# Patient Record
Sex: FEMALE | Race: WHITE | NOT HISPANIC OR LATINO | Employment: OTHER | URBAN - METROPOLITAN AREA
[De-identification: names, ages, dates, MRNs, and addresses within clinical notes are randomized per-mention and may not be internally consistent; named-entity substitution may affect disease eponyms.]

---

## 2017-05-19 PROBLEM — E03.9 HYPOTHYROID: Status: ACTIVE | Noted: 2017-05-19

## 2018-06-05 ENCOUNTER — HOSPITAL ENCOUNTER (OUTPATIENT)
Dept: RADIOLOGY | Facility: OTHER | Age: 63
Discharge: HOME | End: 2018-06-05
Attending: INTERNAL MEDICINE
Payer: COMMERCIAL

## 2018-06-05 DIAGNOSIS — M85.89 OSTEOPENIA OF MULTIPLE SITES: ICD-10-CM

## 2018-06-05 PROCEDURE — 77080 DXA BONE DENSITY AXIAL: CPT | Mod: TC

## 2018-06-05 PROCEDURE — 77080 DXA BONE DENSITY AXIAL: CPT | Mod: 26,,, | Performed by: RADIOLOGY

## 2018-07-06 PROBLEM — I48.0 PAF (PAROXYSMAL ATRIAL FIBRILLATION): Status: ACTIVE | Noted: 2018-07-06

## 2018-09-20 ENCOUNTER — TELEPHONE (OUTPATIENT)
Dept: ELECTROPHYSIOLOGY | Facility: CLINIC | Age: 63
End: 2018-09-20

## 2018-09-20 DIAGNOSIS — I48.0 PAF (PAROXYSMAL ATRIAL FIBRILLATION): Primary | ICD-10-CM

## 2018-09-20 NOTE — TELEPHONE ENCOUNTER
----- Message from Nati Asher MA sent at 9/20/2018 11:39 AM CDT -----  Contact: Office of Dr. Joy Rice [phone]   Please evaluate and let me know if we need to book sooner than 10/17. If so ,how soon?  ----- Message -----  From: Clair Louie  Sent: 9/20/2018  10:25 AM  To: Mary Yeager    Good Morning,     Dr. Rice's office reached out this morning to check the status of this request. The patient stated she did not receive a call yet.    Thank you for your assistance!    ----- Message -----  From: Clair Louie  Sent: 9/17/2018  11:58 AM  To: Lonnie Hernandez MD    Good Morning,     Dr. Rice wanted to know if  the current patient could be scheduled with a sooner F/U appointment(she is currently scheduled for 10/17). The patient returns to [place] on 10/2.     Thank you for your assistance!    Clair Louie   [facility]  [phone]

## 2018-09-20 NOTE — TELEPHONE ENCOUNTER
----- Message from Nati Asher MA sent at 9/20/2018 11:39 AM CDT -----  Contact: Office of Dr. Joy Rice [phone]   Please evaluate and let me know if we need to book sooner than 10/17. If so ,how soon?  ----- Message -----  From: Clair Louie  Sent: 9/20/2018  10:25 AM  To: Mary Yeager    Good Morning,     Dr. Rice's office reached out this morning to check the status of this request. The patient stated she did not receive a call yet.    Thank you for your assistance!    ----- Message -----  From: Clair Louie  Sent: 9/17/2018  11:58 AM  To: Lonnie Hernandez MD    Good Morning,     Dr. Rice wanted to know if  the current patient could be scheduled with a sooner F/U appointment(she is currently scheduled for 10/17). The patient returns to [place] on 10/2.     Thank you for your assistance!    Clair Louie   [facility]  [phone]

## 2018-09-20 NOTE — TELEPHONE ENCOUNTER
Discussed with Dr Hernandez. He wants to get a 48 hour holter prior to OV. Spoke with patient and rescheduled the 24 hour holter to a 48 hour holter. She verbalized understanding.

## 2018-09-20 NOTE — TELEPHONE ENCOUNTER
"Spoke with patient. She is having intermittent palpitations she describes as "pounding" that happen at least 1-2 times daily. She denies any prolonged periods of feeling her heart racing. She is on Eliquis. She recently had STEMI in [place] with thrombectomy. She last saw Dr Hernandez 2 years ago. She is not in any acute distress at this time. Advised that we do not have anything available after 10/3/18 and before 10/17/18. We can get a holter done prior to appt. Scheduled for 10/4/18, but patient wants to have a 4-day holter since that was what she had done in [place]. Advised that I will review with Dr Hernandez and let her know if he recommends anything different than a 24 hour holter at this point. She verbalizes understanding.   "

## 2018-10-01 DIAGNOSIS — I48.0 PAF (PAROXYSMAL ATRIAL FIBRILLATION): Primary | ICD-10-CM

## 2018-10-05 ENCOUNTER — CLINICAL SUPPORT (OUTPATIENT)
Dept: ELECTROPHYSIOLOGY | Facility: CLINIC | Age: 63
End: 2018-10-05
Attending: INTERNAL MEDICINE
Payer: COMMERCIAL

## 2018-10-05 DIAGNOSIS — I48.0 PAF (PAROXYSMAL ATRIAL FIBRILLATION): ICD-10-CM

## 2018-10-05 PROCEDURE — 93224 XTRNL ECG REC UP TO 48 HRS: CPT | Mod: S$GLB,,, | Performed by: INTERNAL MEDICINE

## 2018-10-15 ENCOUNTER — HOSPITAL ENCOUNTER (OUTPATIENT)
Dept: RADIOLOGY | Facility: HOSPITAL | Age: 63
Discharge: HOME | End: 2018-10-15
Attending: INTERNAL MEDICINE
Payer: COMMERCIAL

## 2018-10-15 ENCOUNTER — INITIAL CONSULT (OUTPATIENT)
Dept: CARDIOLOGY | Facility: CLINIC | Age: 63
End: 2018-10-15
Payer: COMMERCIAL

## 2018-10-15 VITALS
HEART RATE: 50 BPM | HEIGHT: 66 IN | OXYGEN SATURATION: 98 % | DIASTOLIC BLOOD PRESSURE: 70 MMHG | BODY MASS INDEX: 28.7 KG/M2 | SYSTOLIC BLOOD PRESSURE: 145 MMHG | WEIGHT: 178.56 LBS

## 2018-10-15 DIAGNOSIS — I25.118 CORONARY ARTERY DISEASE OF NATIVE ARTERY OF NATIVE HEART WITH STABLE ANGINA PECTORIS: ICD-10-CM

## 2018-10-15 DIAGNOSIS — I48.0 PAF (PAROXYSMAL ATRIAL FIBRILLATION): Primary | ICD-10-CM

## 2018-10-15 DIAGNOSIS — E03.4 HYPOTHYROIDISM DUE TO ACQUIRED ATROPHY OF THYROID: ICD-10-CM

## 2018-10-15 DIAGNOSIS — Z12.39 SCREENING FOR BREAST CANCER: ICD-10-CM

## 2018-10-15 DIAGNOSIS — I10 ESSENTIAL HYPERTENSION: ICD-10-CM

## 2018-10-15 DIAGNOSIS — Z90.722 S/P BSO (BILATERAL SALPINGO-OOPHORECTOMY): ICD-10-CM

## 2018-10-15 PROCEDURE — 77067 SCR MAMMO BI INCL CAD: CPT | Mod: TC

## 2018-10-15 PROCEDURE — 77063 BREAST TOMOSYNTHESIS BI: CPT | Mod: 26,,, | Performed by: RADIOLOGY

## 2018-10-15 PROCEDURE — 77067 SCR MAMMO BI INCL CAD: CPT | Mod: 26,,, | Performed by: RADIOLOGY

## 2018-10-15 PROCEDURE — 3078F DIAST BP <80 MM HG: CPT | Mod: CPTII,S$GLB,, | Performed by: INTERNAL MEDICINE

## 2018-10-15 PROCEDURE — 77063 BREAST TOMOSYNTHESIS BI: CPT | Mod: TC

## 2018-10-15 PROCEDURE — 3077F SYST BP >= 140 MM HG: CPT | Mod: CPTII,S$GLB,, | Performed by: INTERNAL MEDICINE

## 2018-10-15 PROCEDURE — 99999 PR PBB SHADOW E&M-EST. PATIENT-LVL V: CPT | Mod: PBBFAC,,, | Performed by: INTERNAL MEDICINE

## 2018-10-15 PROCEDURE — 3008F BODY MASS INDEX DOCD: CPT | Mod: CPTII,S$GLB,, | Performed by: INTERNAL MEDICINE

## 2018-10-15 PROCEDURE — 99203 OFFICE O/P NEW LOW 30 MIN: CPT | Mod: S$GLB,,, | Performed by: INTERNAL MEDICINE

## 2018-10-15 RX ORDER — METOPROLOL TARTRATE 25 MG/1
12.5 TABLET, FILM COATED ORAL
COMMUNITY
Start: 2018-07-01 | End: 2018-10-19

## 2018-10-15 RX ORDER — LOSARTAN POTASSIUM 50 MG/1
50 TABLET ORAL DAILY
Refills: 2 | COMMUNITY
Start: 2018-10-08 | End: 2019-07-17 | Stop reason: SDUPTHER

## 2018-10-15 NOTE — PROGRESS NOTES
Subjective:    Patient ID:  Socorro Amaro is a 63 y.o. lady who presents for evaluation of LAAOD implantation.      Referring Physician: Joy Rice MD/ Dr. Chandu Morales    HPI   62 yo o F who is referred to us by Dr. Morales for evaluation of LAAOD. She has CHADsVASc score of 4. Functional status > 4 METS and denies angina/CHF symptoms. She reports occasional palpitations.     She has PMHx of essential HTN, dyslipidemia, CAD s/p STEMI 07/12/18 due to embolic occlusion of LAD s/p thrombectomy without stenting and pAF on Eliquis. She was diagnosed with pAF in 2016 but started anticoagulation after STEMIn in 6/30/2018.       Her CHADsVASc score is 4 (considering recent thromboembolism episode).    Past Medical History:   Diagnosis Date    HTN (hypertension)     Thyroid disease      Current Outpatient Medications on File Prior to Visit   Medication Sig Dispense Refill    5-hydroxytryptophan (5-HTP) 100 mg Cap Take by mouth every evening.       apixaban (ELIQUIS) 5 mg Tab Take 5 mg by mouth.      cholecalciferol, vitamin D3, (VITAMIN D3) 1,000 unit capsule Vitamin D3      cyanocobalamin (VITAMIN B-12) 1000 MCG tablet Take 5,000 mcg by mouth once daily.      DHA-PHOSPHATIDYLSERINE ORAL phosphatidylserine      levothyroxine (SYNTHROID) 75 MCG tablet Brand only TAKE ONE TABLET BY MOUTH ONE TIME DAILY BEFORE BRRAKFAST 90 tablet 3    loratadine (CLARITIN) 10 mg tablet Allerclear 10 mg tablet   Take 1 tablet as needed by oral route.      losartan (COZAAR) 50 MG tablet Take 50 mg by mouth once daily.  2    FAUSTO, BULK, MISC fausto (bulk)      metoprolol tartrate (LOPRESSOR) 25 MG tablet Take 12.5 mg by mouth.      multivitamin capsule Take 1 capsule by mouth once daily.      olopatadine (PATANOL) 0.1 % ophthalmic solution Place 1 drop into both eyes 2 (two) times daily. (Patient taking differently: Place 1 drop into both eyes daily as needed. ) 5 mL 6    omeprazole (PRILOSEC) 40 MG capsule omeprazole 40  "mg capsule,delayed release   Take 1 capsule every day by oral route.      PHOSPHATIDYL SERINE, BULK, MISC 1 capsule by Misc.(Non-Drug; Combo Route) route once daily. 150 mg per capsule      PRASTERONE, DHEA, (DHEA ORAL) Take by mouth once daily. 50 mg      triamcinolone (NASACORT) 55 mcg nasal inhaler 2 sprays by Nasal route once daily.      vitamin D 1000 units Tab Take 2,000 Units by mouth once daily.       ALPRAZolam (XANAX) 0.25 MG tablet TAKE 1 TABLET BY MOUTH 1 hour before flight  0    FLUZONE QUAD 0422-9999, PF, 60 mcg (15 mcg x 4)/0.5 mL Syrg       ibuprofen (ADVIL,MOTRIN) 600 MG tablet Take 1 tablet (600 mg total) by mouth every 6 (six) hours as needed for Pain (cramping). 30 tablet 0    lorazepam (ATIVAN) 1 MG tablet as needed.       ranitidine (ZANTAC) 300 MG tablet ranitidine 300 mg tablet      [DISCONTINUED] cyanocobalamin (VITAMIN B-12) 100 MCG tablet Vitamin B12      [DISCONTINUED] loratadine (CLARITIN) 10 mg tablet Take 10 mg by mouth daily as needed for Allergies.      [DISCONTINUED] prasterone, dhea,-calcium carb (DHEA) 10 mg-47 mg calcium Tab DHEA      [DISCONTINUED] ranitidine (ZANTAC) 300 MG tablet ranitidine 300 mg tablet      [DISCONTINUED] valsartan (DIOVAN) 80 MG tablet Take 1 tablet (80 mg total) by mouth once daily. 90 tablet 3     No current facility-administered medications on file prior to visit.      Vitals:    10/15/18 1114 10/15/18 1117   BP: (!) 140/71 (!) 145/70   BP Location: Left arm Right arm   Patient Position: Sitting Sitting   BP Method: Large (Automatic) Large (Automatic)   Pulse: (!) 52 (!) 50   SpO2: 98%    Weight: 81 kg (178 lb 9.2 oz)    Height: 5' 6" (1.676 m)      Body mass index is 28.82 kg/m².          Review of Systems   Constitution: Negative for chills, decreased appetite, fever, weakness, night sweats, weight gain and weight loss.   HENT: Negative for congestion, hoarse voice, stridor and tinnitus.    Eyes: Negative for blurred vision, pain and " visual disturbance.   Cardiovascular: Negative for chest pain, claudication, dyspnea on exertion, irregular heartbeat, leg swelling, near-syncope, orthopnea, palpitations, paroxysmal nocturnal dyspnea and syncope.   Respiratory: Negative for cough, hemoptysis, shortness of breath, snoring and wheezing.    Endocrine: Negative for cold intolerance, heat intolerance and polyuria.   Hematologic/Lymphatic: Positive for bleeding problem. Bruises/bleeds easily.   Skin: Negative for color change and rash.   Musculoskeletal: Negative for arthritis, back pain, joint pain, muscle cramps, myalgias and stiffness.   Gastrointestinal: Negative for abdominal pain, anorexia, constipation, diarrhea, dysphagia, heartburn, hemorrhoids, melena, nausea and vomiting.   Genitourinary: Negative for frequency, hematuria, hesitancy, nocturia and urgency.   Neurological: Negative for difficulty with concentration, dizziness, focal weakness, headaches, light-headedness, numbness, seizures, tremors and vertigo.   Psychiatric/Behavioral: Negative for altered mental status and depression. The patient does not have insomnia.    Allergic/Immunologic: Negative for hives.          Objective:    Physical Exam   Constitutional: She appears well-developed and well-nourished.   HENT:   Head: Normocephalic and atraumatic.   Right Ear: External ear normal.   Left Ear: External ear normal.   Eyes: Conjunctivae and EOM are normal. Pupils are equal, round, and reactive to light.   Neck: Normal range of motion. Neck supple. No JVD present. No thyromegaly present.   Cardiovascular: Normal rate, regular rhythm, S1 normal, S2 normal, normal heart sounds and intact distal pulses. Exam reveals no gallop, no S3 and no friction rub.   No murmur heard.  Pulses:       Radial pulses are 2+ on the right side, and 2+ on the left side.        Femoral pulses are 2+ on the right side, and 2+ on the left side.       Dorsalis pedis pulses are 2+ on the right side, and 2+ on the  left side.        Posterior tibial pulses are 2+ on the right side, and 2+ on the left side.   Pulmonary/Chest: Effort normal. No stridor. She has no wheezes. She has no rales. She exhibits no tenderness.   Abdominal: Soft. Bowel sounds are normal. She exhibits no distension. There is no tenderness. There is no rebound and no guarding.   Musculoskeletal: Normal range of motion. She exhibits no edema or tenderness.   Psychiatric: She has a normal mood and affect.     Assessment:       1. PAF (paroxysmal atrial fibrillation) with CHADsVASc score of 4 - on Eliquis    2. Coronary artery disease of native artery of native heart with stable angina pectoris    3. Essential hypertension    4. S/P laparoscopic BSO (bilateral salpingo-oophorectomy)    5. Hypothyroidism due to acquired atrophy of thyroid         Plan:     Recommend to continue anti-coagulation with eliquis per now. She is not currently at high bleeding risk to warrant any LAAOD implantation. Her HASBLED score is 2.    Will obtain records from [place], [place] including cath films/Echo disc      Dany Burgess MD  PGY-8  Interventional Cardiology/Structural Fellow     Staff:  I have personally taken the history and examined this patient and agree with the fellow's note as stated above and amended it accordingly :-) This nice lady is a friend of Chandu Morales and had an embolic coronary occlusion with STEMI successfully treated with catheter thrombectomy.  She did not bring her echo or cath films with her.  Because of PAF she was put on OAC after her event.  Dr. Morales asked that I see her and discuss the Watchman device.    This healthy lady has no history or physical examination findings that suggest she would be at risk for long term OAC.  Therefore, there is no role at this time for Watchman Device.  I've encouraged her to see Dr. Hernandez for her afib  and be followed and treated.

## 2018-10-15 NOTE — LETTER
October 15, 2018      [place] [id number] [facility] [address]           [facility]  [address]  [facility] [address]  Phone: [phone]          Patient: Socorro Amaro   MR Number: [id number]   YOB: 1955   Date of Visit: 10/15/2018       Dear [facility]:    Thank you for referring Socorro Amaro to me for evaluation. Attached you will find relevant portions of my assessment and plan of care.    If you have questions, please do not hesitate to call me. I look forward to following Socorro Amaro along with you.    Sincerely,    Hernandez Mir MD    Enclosure  CC:  No Recipients    If you would like to receive this communication electronically, please contact [email or web address] or [phone] to request more information on [employer] Link access.    For providers and/or their staff who would like to refer a patient to [facility], please contact us through our one-stop-shop provider referral line, [facility], at [phone].    If you feel you have received this communication in error or would no longer like to receive these types of communications, please e-mail [email or web address]

## 2018-10-17 ENCOUNTER — OFFICE VISIT (OUTPATIENT)
Dept: PODIATRY | Facility: CLINIC | Age: 63
End: 2018-10-17
Payer: COMMERCIAL

## 2018-10-17 ENCOUNTER — HOSPITAL ENCOUNTER (OUTPATIENT)
Dept: CARDIOLOGY | Facility: CLINIC | Age: 63
Discharge: HOME | End: 2018-10-17
Payer: COMMERCIAL

## 2018-10-17 ENCOUNTER — OFFICE VISIT (OUTPATIENT)
Dept: ELECTROPHYSIOLOGY | Facility: CLINIC | Age: 63
End: 2018-10-17
Payer: COMMERCIAL

## 2018-10-17 VITALS
SYSTOLIC BLOOD PRESSURE: 128 MMHG | HEART RATE: 54 BPM | WEIGHT: 179.69 LBS | DIASTOLIC BLOOD PRESSURE: 72 MMHG | BODY MASS INDEX: 28.88 KG/M2 | HEIGHT: 66 IN

## 2018-10-17 VITALS
HEART RATE: 59 BPM | WEIGHT: 179.69 LBS | DIASTOLIC BLOOD PRESSURE: 72 MMHG | SYSTOLIC BLOOD PRESSURE: 136 MMHG | HEIGHT: 66 IN | BODY MASS INDEX: 28.88 KG/M2

## 2018-10-17 DIAGNOSIS — I25.118 CORONARY ARTERY DISEASE OF NATIVE ARTERY OF NATIVE HEART WITH STABLE ANGINA PECTORIS: ICD-10-CM

## 2018-10-17 DIAGNOSIS — L60.9 DISEASE OF NAIL: Primary | ICD-10-CM

## 2018-10-17 DIAGNOSIS — I48.0 PAF (PAROXYSMAL ATRIAL FIBRILLATION): ICD-10-CM

## 2018-10-17 DIAGNOSIS — I48.0 PAF (PAROXYSMAL ATRIAL FIBRILLATION): Primary | ICD-10-CM

## 2018-10-17 DIAGNOSIS — I10 ESSENTIAL HYPERTENSION: ICD-10-CM

## 2018-10-17 DIAGNOSIS — E03.4 HYPOTHYROIDISM DUE TO ACQUIRED ATROPHY OF THYROID: ICD-10-CM

## 2018-10-17 PROCEDURE — 3008F BODY MASS INDEX DOCD: CPT | Mod: CPTII,S$GLB,, | Performed by: INTERNAL MEDICINE

## 2018-10-17 PROCEDURE — 99203 OFFICE O/P NEW LOW 30 MIN: CPT | Mod: S$GLB,,, | Performed by: PODIATRIST

## 2018-10-17 PROCEDURE — 99999 PR PBB SHADOW E&M-EST. PATIENT-LVL V: CPT | Mod: PBBFAC,,, | Performed by: PODIATRIST

## 2018-10-17 PROCEDURE — 3074F SYST BP LT 130 MM HG: CPT | Mod: CPTII,S$GLB,, | Performed by: INTERNAL MEDICINE

## 2018-10-17 PROCEDURE — 3008F BODY MASS INDEX DOCD: CPT | Mod: CPTII,S$GLB,, | Performed by: PODIATRIST

## 2018-10-17 PROCEDURE — 93000 ELECTROCARDIOGRAM COMPLETE: CPT | Mod: S$GLB,,, | Performed by: INTERNAL MEDICINE

## 2018-10-17 PROCEDURE — 99215 OFFICE O/P EST HI 40 MIN: CPT | Mod: S$GLB,,, | Performed by: INTERNAL MEDICINE

## 2018-10-17 PROCEDURE — 99999 PR PBB SHADOW E&M-EST. PATIENT-LVL III: CPT | Mod: PBBFAC,,, | Performed by: INTERNAL MEDICINE

## 2018-10-17 PROCEDURE — 87102 FUNGUS ISOLATION CULTURE: CPT

## 2018-10-17 PROCEDURE — 3078F DIAST BP <80 MM HG: CPT | Mod: CPTII,S$GLB,, | Performed by: INTERNAL MEDICINE

## 2018-10-17 PROCEDURE — 3075F SYST BP GE 130 - 139MM HG: CPT | Mod: CPTII,S$GLB,, | Performed by: PODIATRIST

## 2018-10-17 PROCEDURE — 3078F DIAST BP <80 MM HG: CPT | Mod: CPTII,S$GLB,, | Performed by: PODIATRIST

## 2018-10-17 NOTE — LETTER
October 18, 2018      Joy Rice MD  [address]  [address]  [facility] [address]           [facility] - Podiatry  [address]  [facility] [address]  Phone: [phone]          Patient: Socorro Amaro   MR Number: [id number]   YOB: 1955   Date of Visit: 10/17/2018       Dear Dr. Joy Rice:    Thank you for referring Socorro Amaro to me for evaluation. Attached you will find relevant portions of my assessment and plan of care.    If you have questions, please do not hesitate to call me. I look forward to following Socorro Amaro along with you.    Sincerely,    Kristyn Ellsworth, BHARTI    Enclosure  CC:  No Recipients    If you would like to receive this communication electronically, please contact [email or web address] or [phone] to request more information on [employer] Link access.    For providers and/or their staff who would like to refer a patient to [facility], please contact us through our one-stop-shop provider referral line, [facility], at [phone].    If you feel you have received this communication in error or would no longer like to receive these types of communications, please e-mail [email or web address]

## 2018-10-17 NOTE — PROGRESS NOTES
Subjective:    Patient ID:  Socorro Amaro is a 63 y.o. female who presents for follow-up of Atrial Fibrillation      HPI 62 yo female with HTN, Hypothyroidism, atrial fibrillation.  Was in  [place] in [place] with new onset chest pain, after a bout of bronchitis  KEYON 2016 no ischemia, LVEF 60-65% normal   [place] : Holter 3/16 1400 PVCs, 126 APCs  Since her last follow-up with me, she has been diagnosed with atrial fibrillation. Was not on oral anticoagulation.  CAD s/p STEMI 07/12/18 due to embolic occlusion of LAD s/p thrombectomy without stenting.  Placed on Eliquis, lopressor 25 mg BID.  48 hr holter 10/5/18 nsr without significant arrhythmias.  She indicates that the diagnosis of atrial fibrillation was based on what she said to the physicians during her STEMI.  We have never documented AF.  All of her symptoms are lasting seconds, not minutes or hours.  Echo 6/30/18 EF 55-60%, normal RV function apical hypokinesis  Notes feeling fuzzy since addition of her medications.              Review of Systems   Constitution: Negative. Negative for weakness and malaise/fatigue.   Cardiovascular: Negative for chest pain, dyspnea on exertion, irregular heartbeat, leg swelling, near-syncope, orthopnea, palpitations, paroxysmal nocturnal dyspnea and syncope.   Respiratory: Negative for cough and shortness of breath.    Neurological: Negative for dizziness and light-headedness.   All other systems reviewed and are negative.       Objective:    Physical Exam   Constitutional: She is oriented to person, place, and time. She appears well-developed and well-nourished.   Eyes: Conjunctivae are normal. No scleral icterus.   Neck: No JVD present. No tracheal deviation present.   Cardiovascular: Normal rate and regular rhythm. PMI is not displaced.   Pulmonary/Chest: Effort normal and breath sounds normal. No respiratory distress.   Abdominal: Soft. There is no hepatosplenomegaly. There is no tenderness.   Musculoskeletal: She  exhibits no edema or tenderness.   Neurological: She is alert and oriented to person, place, and time.   Skin: Skin is warm and dry. No rash noted.   Psychiatric: She has a normal mood and affect. Her behavior is normal.         Assessment:       1. PAF (paroxysmal atrial fibrillation)    2. Essential hypertension    3. Coronary artery disease of native artery of native heart with stable angina pectoris    4. Hypothyroidism due to acquired atrophy of thyroid         Plan:             The notes indicate a diagnosis of atrial fibrillation, but that has never been documented that I am aware of (pt reports her impression of having atrial fibrillation was based on a few seconds of extra beats as opposed to something sustained).    Recommend implantation of ILR for monitoring for atrial fibrillation.  Arrange for General Cardiology to see patient.  Consider other etiologies for her thrombo-embolic event.  Risks and benefits of ILR discussed.  Continue Eliquis soto-procedure.

## 2018-10-17 NOTE — LETTER
October 17, 2018      Joy Rice MD  [address]  [address]  [facility] [address]           [facility] - Arrhythmia  [address]  [facility] [address]  Phone: [phone]  Fax: [phone]          Patient: Socorro Amaro   MR Number: [id number]   YOB: 1955   Date of Visit: 10/17/2018       Dear Dr. Joy Rice:    Thank you for referring Socorro Amaro to me for evaluation. Attached you will find relevant portions of my assessment and plan of care.    If you have questions, please do not hesitate to call me. I look forward to following Socorro Amaro along with you.    Sincerely,    Lonnie Hernandez MD    Enclosure  CC:  No Recipients    If you would like to receive this communication electronically, please contact [email or web address] or [phone] to request more information on [employer] Link access.    For providers and/or their staff who would like to refer a patient to [facility], please contact us through our one-stop-shop provider referral line, [facility], at [phone].    If you feel you have received this communication in error or would no longer like to receive these types of communications, please e-mail [email or web address]

## 2018-10-19 ENCOUNTER — DOCUMENTATION ONLY (OUTPATIENT)
Dept: CARDIOLOGY | Facility: CLINIC | Age: 63
End: 2018-10-19

## 2018-10-19 DIAGNOSIS — I24.0: Primary | ICD-10-CM

## 2018-10-19 NOTE — PROGRESS NOTES
Lonnie and I cannot find documentation of her ever having PAF.  He has recommended a loop recorder and I'm recommending a MARINE/bubble study to R/O PFO.  If not afib and a PFO, then PFO closure is indicated.

## 2018-10-19 NOTE — PROGRESS NOTES
Subjective:      Patient ID: Socorro Amaro is a 63 y.o. female.    Chief Complaint: Foot Problem (bilateral) and Nail Problem (toenail fungus)    Socorro is a 63 y.o. female who presents to the clinic complaining of thick and discolored toenails on both feet. Socorro is inquiring about treatment options.            Patient Active Problem List   Diagnosis    Postmenopausal    HTN (hypertension)    Well woman exam with routine gynecological exam    Palpitations    Pelvic pain in female    S/P laparoscopic BSO (bilateral salpingo-oophorectomy)    Ovarian cyst, left    Hypothyroid    PAF (paroxysmal atrial fibrillation)    Coronary artery disease of native artery of native heart with stable angina pectoris       Current Outpatient Medications on File Prior to Visit   Medication Sig Dispense Refill    5-hydroxytryptophan (5-HTP) 100 mg Cap Take by mouth every evening.       ALPRAZolam (XANAX) 0.25 MG tablet TAKE 1 TABLET BY MOUTH 1 hour before flight  0    apixaban (ELIQUIS) 5 mg Tab Take 5 mg by mouth.      cholecalciferol, vitamin D3, (VITAMIN D3) 1,000 unit capsule Vitamin D3      cyanocobalamin (VITAMIN B-12) 1000 MCG tablet Take 5,000 mcg by mouth once daily.      DHA-PHOSPHATIDYLSERINE ORAL phosphatidylserine      FLUZONE QUAD 3579-4508, PF, 60 mcg (15 mcg x 4)/0.5 mL Syrg       ibuprofen (ADVIL,MOTRIN) 600 MG tablet Take 1 tablet (600 mg total) by mouth every 6 (six) hours as needed for Pain (cramping). 30 tablet 0    levothyroxine (SYNTHROID) 75 MCG tablet Brand only TAKE ONE TABLET BY MOUTH ONE TIME DAILY BEFORE BRRAKFAST 90 tablet 3    loratadine (CLARITIN) 10 mg tablet Allerclear 10 mg tablet   Take 1 tablet as needed by oral route.      lorazepam (ATIVAN) 1 MG tablet as needed.       losartan (COZAAR) 50 MG tablet Take 50 mg by mouth once daily.  2    FAUSTO, BULK, MISC fausto (bulk)      metoprolol tartrate (LOPRESSOR) 25 MG tablet Take 12.5 mg by mouth.      multivitamin capsule  Take 1 capsule by mouth once daily.      olopatadine (PATANOL) 0.1 % ophthalmic solution Place 1 drop into both eyes 2 (two) times daily. (Patient taking differently: Place 1 drop into both eyes daily as needed. ) 5 mL 6    omeprazole (PRILOSEC) 40 MG capsule omeprazole 40 mg capsule,delayed release   Take 1 capsule every day by oral route.      PHOSPHATIDYL SERINE, BULK, MISC 1 capsule by Misc.(Non-Drug; Combo Route) route once daily. 150 mg per capsule      PRASTERONE, DHEA, (DHEA ORAL) Take by mouth once daily. 50 mg      ranitidine (ZANTAC) 300 MG tablet ranitidine 300 mg tablet      triamcinolone (NASACORT) 55 mcg nasal inhaler 2 sprays by Nasal route once daily.      vitamin D 1000 units Tab Take 2,000 Units by mouth once daily.        No current facility-administered medications on file prior to visit.        Review of patient's allergies indicates:   Allergen Reactions    Erythromycin Nausea And Vomiting     Severe vomiting       Past Surgical History:   Procedure Laterality Date    MASTECTOMY      RHINOPLASTY TIP      2006/2006    [id number] Bilateral 12/30/2016    [facility]    TONSILLECTOMY, ADENOIDECTOMY         Family History   Problem Relation Age of Onset    Arthritis Mother     Cancer Mother 72        spindle cell ca  tongue     Hypertension Mother     Depression Sister     Hypertension Brother     Depression Sister     Hypertension Father        Social History     Socioeconomic History    Marital status:      Spouse name: Not on file    Number of children: Not on file    Years of education: Not on file    Highest education level: Not on file   Social Needs    Financial resource strain: Not on file    Food insecurity - worry: Not on file    Food insecurity - inability: Not on file    Transportation needs - medical: Not on file    Transportation needs - non-medical: Not on file   Occupational History     "Not on file   Tobacco Use    Smoking status: Never Smoker    Smokeless tobacco: Never Used   Substance and Sexual Activity    Alcohol use: Yes     Alcohol/week: 1.8 oz     Types: 1 Glasses of wine, 1 Cans of beer, 1 Shots of liquor per week     Comment: 5x week    Drug use: Not on file    Sexual activity: Yes     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   Cardiovascular: Negative for leg swelling.   Skin: Positive for nail changes.   Musculoskeletal: Negative for arthritis, joint pain, joint swelling and myalgias.   Gastrointestinal: Negative for nausea and vomiting.   Neurological: Negative for loss of balance, numbness and paresthesias.           Objective:       Vitals:    10/17/18 1459   BP: 136/72   Pulse: (!) 59   Weight: 81.5 kg (179 lb 10.8 oz)   Height: 5' 6" (1.676 m)   PainSc: 0-No pain        Physical Exam   Constitutional: She is oriented to person, place, and time. She appears well-developed and well-nourished.   Cardiovascular:   Pulses:       Dorsalis pedis pulses are 2+ on the right side, and 2+ on the left side.        Posterior tibial pulses are 2+ on the right side, and 2+ on the left side.   Musculoskeletal: She exhibits no edema or tenderness.        Right ankle: Normal.        Left ankle: Normal.        Right foot: There is no swelling, no crepitus and no deformity.        Left foot: There is no swelling, no crepitus and no deformity.   Adequate joint range of motion without pain, limitation, nor crepitation Bilateral feet and ankle joints. Muscle strength is 5/5 in all groups bilaterally.         Lymphadenopathy:   No palpable lymph nodes   Neurological: She is alert and oriented to person, place, and time. She has normal strength.   Skin: Skin is warm, dry and intact. No rash noted. No " erythema. Nails show no clubbing.   Thickened discolored b/l hallux nails w/ slight subungual debris No surrounding erythema, edema, malodor, nor drainage noted      Psychiatric: She has a normal mood and affect. Her behavior is normal.             Assessment:       Encounter Diagnosis   Name Primary?    Disease of nail Yes         Plan:       oScorro was seen today for foot problem and nail problem.    Diagnoses and all orders for this visit:    Disease of nail  -     CULTURE, FUNGUS      I counseled the patient on her conditions, their implications and medical management.      Discussed  options for nail fungus including topicals such as Jublia and Penlac, Oral Lamisil, Lasers, and topical OTC remedies such as vicks vapor rub and Kerasal.    Non infected nail bed injury likely secondary to onycholytic dystropic toenail causing repetitive nail bed irritation with resultant subungual nail bed injury.  Nail bed trimmed back to most proximal point of attachemnt with patients verbal permission utilizing  Sterile nail nippers. No blood was drawn. No acute SOI noted.  Small amount of antibiotic ointment and bandage applied. Pt instructed to do this daily. Removing the bandage at night to allow the area to dry     If + will  Start Penlac  .

## 2018-10-20 ENCOUNTER — PATIENT MESSAGE (OUTPATIENT)
Dept: ELECTROPHYSIOLOGY | Facility: CLINIC | Age: 63
End: 2018-10-20

## 2018-10-22 ENCOUNTER — LAB VISIT (OUTPATIENT)
Dept: LAB | Facility: HOSPITAL | Age: 63
End: 2018-10-22
Attending: INTERNAL MEDICINE
Payer: COMMERCIAL

## 2018-10-22 DIAGNOSIS — I10 ESSENTIAL HYPERTENSION: ICD-10-CM

## 2018-10-22 DIAGNOSIS — E03.4 HYPOTHYROIDISM DUE TO ACQUIRED ATROPHY OF THYROID: ICD-10-CM

## 2018-10-22 DIAGNOSIS — I21.9 MYOCARDIAL INFARCTION, UNSPECIFIED MI TYPE, UNSPECIFIED ARTERY: ICD-10-CM

## 2018-10-22 LAB
25(OH)D3+25(OH)D2 SERPL-MCNC: 40 NG/ML
ALBUMIN SERPL BCP-MCNC: 4 G/DL
ALP SERPL-CCNC: 80 U/L
ALT SERPL W/O P-5'-P-CCNC: 20 U/L
ANION GAP SERPL CALC-SCNC: 4 MMOL/L
AST SERPL-CCNC: 20 U/L
BASOPHILS # BLD AUTO: 0.05 K/UL
BASOPHILS NFR BLD: 1.1 %
BILIRUB SERPL-MCNC: 0.7 MG/DL
BUN SERPL-MCNC: 16 MG/DL
CALCIUM SERPL-MCNC: 9.7 MG/DL
CHLORIDE SERPL-SCNC: 107 MMOL/L
CHOLEST SERPL-MCNC: 202 MG/DL
CHOLEST/HDLC SERPL: 2.4 {RATIO}
CO2 SERPL-SCNC: 30 MMOL/L
CREAT SERPL-MCNC: 0.9 MG/DL
CRP SERPL-MCNC: 1.37 MG/L
DIFFERENTIAL METHOD: ABNORMAL
EOSINOPHIL # BLD AUTO: 0.1 K/UL
EOSINOPHIL NFR BLD: 2 %
ERYTHROCYTE [DISTWIDTH] IN BLOOD BY AUTOMATED COUNT: 12.6 %
EST. GFR  (AFRICAN AMERICAN): >60 ML/MIN/1.73 M^2
EST. GFR  (NON AFRICAN AMERICAN): >60 ML/MIN/1.73 M^2
GLUCOSE SERPL-MCNC: 91 MG/DL
HCT VFR BLD AUTO: 43.6 %
HDLC SERPL-MCNC: 83 MG/DL
HDLC SERPL: 41.1 %
HGB BLD-MCNC: 14 G/DL
IMM GRANULOCYTES # BLD AUTO: 0.01 K/UL
IMM GRANULOCYTES NFR BLD AUTO: 0.2 %
LDLC SERPL CALC-MCNC: 107 MG/DL
LYMPHOCYTES # BLD AUTO: 1.3 K/UL
LYMPHOCYTES NFR BLD: 29.1 %
MAGNESIUM SERPL-MCNC: 2 MG/DL
MCH RBC QN AUTO: 28.3 PG
MCHC RBC AUTO-ENTMCNC: 32.1 G/DL
MCV RBC AUTO: 88 FL
MONOCYTES # BLD AUTO: 0.2 K/UL
MONOCYTES NFR BLD: 5.5 %
NEUTROPHILS # BLD AUTO: 2.7 K/UL
NEUTROPHILS NFR BLD: 62.1 %
NONHDLC SERPL-MCNC: 119 MG/DL
NRBC BLD-RTO: 0 /100 WBC
PLATELET # BLD AUTO: 200 K/UL
PMV BLD AUTO: 11.2 FL
POTASSIUM SERPL-SCNC: 4.6 MMOL/L
PROT SERPL-MCNC: 6.7 G/DL
RBC # BLD AUTO: 4.95 M/UL
SODIUM SERPL-SCNC: 141 MMOL/L
T4 FREE SERPL-MCNC: 1.1 NG/DL
TRIGL SERPL-MCNC: 60 MG/DL
TSH SERPL DL<=0.005 MIU/L-ACNC: 0.86 UIU/ML
VIT B12 SERPL-MCNC: 619 PG/ML
WBC # BLD AUTO: 4.4 K/UL

## 2018-10-22 PROCEDURE — 80061 LIPID PANEL: CPT

## 2018-10-22 PROCEDURE — 36415 COLL VENOUS BLD VENIPUNCTURE: CPT | Mod: PO

## 2018-10-22 PROCEDURE — 84439 ASSAY OF FREE THYROXINE: CPT

## 2018-10-22 PROCEDURE — 83735 ASSAY OF MAGNESIUM: CPT

## 2018-10-22 PROCEDURE — 80053 COMPREHEN METABOLIC PANEL: CPT

## 2018-10-22 PROCEDURE — 82306 VITAMIN D 25 HYDROXY: CPT

## 2018-10-22 PROCEDURE — 85025 COMPLETE CBC W/AUTO DIFF WBC: CPT

## 2018-10-22 PROCEDURE — 86141 C-REACTIVE PROTEIN HS: CPT

## 2018-10-22 PROCEDURE — 84443 ASSAY THYROID STIM HORMONE: CPT

## 2018-10-22 PROCEDURE — 82607 VITAMIN B-12: CPT

## 2018-10-23 ENCOUNTER — PATIENT MESSAGE (OUTPATIENT)
Dept: ELECTROPHYSIOLOGY | Facility: CLINIC | Age: 63
End: 2018-10-23

## 2018-10-23 DIAGNOSIS — I48.0 PAF (PAROXYSMAL ATRIAL FIBRILLATION): Primary | ICD-10-CM

## 2018-10-24 NOTE — PROGRESS NOTES
I   MPLANTABLE LOOP RECORDER EDUCATION CHECKLIST    November 29, 2018 at 6:30 am   REPORT TO THE CARDIOLOGY WAITING AREA ON 3RD FLOOR OF THE HOSPITAL     WASH WITH HIBICLENS ANTIBACTERIAL SOAP (SUCH AS DIAL) ON THE NIGHT BEFORE AND THE MORNING OF YOUR PROCEDURE PRIOR TO ARRIVAL    MEDICATIONS:  YOU MAY TAKE YOUR NORMAL MORNING MEDICATIONS WITH A SIP OF WATER    DIRECTIONS TO CARDIOLOGY WAITING AREA  If you park in the Parking Garage:  Take elevators to the 2nd floor  Walk up ramp and turn right by Gold Elevators  Take elevator to the 3rd floor  Upon exiting the elevator, turn away from the clinic areas  Walk long [place] around to front of hospital to area with windows overlooking [facility]  Check in at Reception Desk  OR  If family is dropping you off:  Have them drop you off at the front of the Hospital  (Near the ER, where all the flags are hung).  Take the E elevators to the 3rd floor.  Check in at the Reception Desk in the waiting room.    YOU WILL GO HOME AFTER YOUR PROCEDURE    YOUR PAIN WILL ME MONITORED BY THE NURSE DURING YOUR PROCEDURE    Any need to reschedule or cancel procedures, or any questions regarding your procedures should be addressed directly with the Arrhythmia Department Nurses at the following phone number: [phone]            Post-Procedure Patient Discharge Instructions  Loop Recorders    Wound Care   If you are discharged with a standard dressing over the incision, you may remove the dressing after 24 hours.    If there are Steri-strips (strips of tape) over your incision, leave them on until your follow-up appointment. They may begin to fall off on their own, which is normal. If there is Dermabond (clear glue) over your incision, do not scrub it off. It acts as a barrier and will eventually disappear.   You may be discharged with 5 days of oral antibiotics. Please take the full prescription until it is gone.   Do not get the incision wet for 48 hours following the  procedure. You may sponge bath during this period, working around the incision. After 48 hours, you may shower, but you should still try to keep this area as dry as possible, and avoid direct water contact to the incision (allow the water to hit back of your shoulder rather than directly on the incision). Gently pat the incision dry if it does get wet.   You may take regular showers after 2 weeks, unless otherwise indicated at your follow-up visit.   Do not submerge the incision in a tub, pool, or body of water for 6 weeks.   If you notice unusual swelling, redness, drainage, have more device site pain, chest pain, shortness of breath, or have a fever greater than 100 degrees, call our device clinic immediately: [phone] or [phone] during normal office hours. You may call [phone] after-hours or on weekends and ask for the electrophysiologist on call.  Activity    If you were driving prior to the procedure, you may resume driving right after your procedure (as long you did not receive any sedation). If you have a history of passing out or a history of certain arrhythmias, there may be driving restrictions unrelated to the procedure. Please clarify with your physician if this is the case.   Avoid rough contact at the device site for 6 weeks.   You may participate in sexual activity unless otherwise instructed.   You may return to work the follow day unless told otherwise by your provider.  Long-Term Instructions   Metal Detectors at Airports: Metal detectors at airports do not interfere with you loop recorder.   MRI: You may have an MRI with an implantable loop recorder. All that is required is that you send a transmission to download your information before and after you have your MRI.  Long-Term Follow-Up   Your device has the ability to transmit device information from home to the doctors office using a home monitoring system.   This remote system takes the place of a doctors  visit. Your device will be checked from home every 31 days. Every 12 months, you will be asked to come into the office for an in-office check.   Your device should last in the range of 3 years.

## 2018-10-30 ENCOUNTER — OFFICE VISIT (OUTPATIENT)
Dept: CARDIOLOGY | Facility: CLINIC | Age: 63
End: 2018-10-30
Payer: COMMERCIAL

## 2018-10-30 VITALS
BODY MASS INDEX: 28.45 KG/M2 | HEART RATE: 59 BPM | DIASTOLIC BLOOD PRESSURE: 74 MMHG | HEIGHT: 66 IN | SYSTOLIC BLOOD PRESSURE: 114 MMHG | WEIGHT: 177 LBS

## 2018-10-30 DIAGNOSIS — I21.02 ST ELEVATION MYOCARDIAL INFARCTION INVOLVING LEFT ANTERIOR DESCENDING (LAD) CORONARY ARTERY: ICD-10-CM

## 2018-10-30 DIAGNOSIS — I10 ESSENTIAL HYPERTENSION: Primary | ICD-10-CM

## 2018-10-30 PROBLEM — I21.3 STEMI (ST ELEVATION MYOCARDIAL INFARCTION): Status: ACTIVE | Noted: 2018-06-30

## 2018-10-30 PROBLEM — I48.0 PAF (PAROXYSMAL ATRIAL FIBRILLATION): Status: RESOLVED | Noted: 2018-07-06 | Resolved: 2018-10-30

## 2018-10-30 PROCEDURE — 3008F BODY MASS INDEX DOCD: CPT | Mod: CPTII,S$GLB,, | Performed by: INTERNAL MEDICINE

## 2018-10-30 PROCEDURE — 99204 OFFICE O/P NEW MOD 45 MIN: CPT | Mod: S$GLB,,, | Performed by: INTERNAL MEDICINE

## 2018-10-30 PROCEDURE — 3074F SYST BP LT 130 MM HG: CPT | Mod: CPTII,S$GLB,, | Performed by: INTERNAL MEDICINE

## 2018-10-30 PROCEDURE — 3078F DIAST BP <80 MM HG: CPT | Mod: CPTII,S$GLB,, | Performed by: INTERNAL MEDICINE

## 2018-10-30 PROCEDURE — 99999 PR PBB SHADOW E&M-EST. PATIENT-LVL III: CPT | Mod: PBBFAC,,, | Performed by: INTERNAL MEDICINE

## 2018-10-30 RX ORDER — UBIDECARENONE 30 MG
300 CAPSULE ORAL DAILY
COMMUNITY
End: 2022-02-18

## 2018-10-30 NOTE — PROGRESS NOTES
Subjective:   Patient ID:  Socorro Amaro is a 63 y.o. female who presents for evaluation of Chest Pain (Discomfort )      HPI: She presents today to establish care following an embolic STEMI in her LAD in June while in [place]. She has a history of HTN. She is quite active, hiking regularly. In June she developed acute onset SSCP and was found to be having an anterior STEMI. She was taken to the cath lab emergently and found to have an embolic occlusion of her LAD and no CAD. She was treated with thrombectomy. The event was thought to be secondary to self reported atrial fibrillation, however, she has not actually ever had any documented afib. She does report frequent heart palpitations that only last a few seconds. A 48 hour holter monitor was done earlier this month which was normal. She denies any history of VTE. She was on a 2 day car ride two weeks prior to her event. She flies often. There is no family history of VTE. She did not have any calf pain or swelling prior to the STEMI. She has recovered well. She has not done cardiac rehab and would like to do it at .  Her echo from [place] showed and LVEF of 55-60% with apical hypokinesis.      ECG: (10/17/18): Sinus bradycardia 54 bpm.    Past Medical History:   Diagnosis Date    HTN (hypertension)     STEMI (ST elevation myocardial infarction) 06/30/2018    embolic event    Thyroid disease        Past Surgical History:   Procedure Laterality Date    CARDIAC CATHETERIZATION      Embolic occlusions of LAD, No other CAD    CORONARY ANGIOPLASTY  06/2018    LAD thrombectomy in [place]    PARTIAL HYSTERECTOMY  12/2016    RHINOPLASTY TIP      2006/2006    [id number]-JNSETWILVVVZ-MBUNWBYMUIRD Bilateral 12/30/2016    [facility]    TONSILLECTOMY, ADENOIDECTOMY         Social History     Socioeconomic History    Marital status:      Spouse name: None    Number of children: None    Years of education: None    Highest  education level: None   Social Needs    Financial resource strain: None    Food insecurity - worry: None    Food insecurity - inability: None    Transportation needs - medical: None    Transportation needs - non-medical: None   Occupational History    None   Tobacco Use    Smoking status: Never Smoker    Smokeless tobacco: Never Used   Substance and Sexual Activity    Alcohol use: Yes     Alcohol/week: 1.8 oz     Types: 1 Glasses of wine, 1 Cans of beer, 1 Shots of liquor per week     Comment: 5x week    Drug use: None    Sexual activity: Yes     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 If you have any questions, ask your nurse or doctor.               CHANGE how you take these medications    olopatadine 0.1 % ophthalmic solution  Commonly known as:  PATANOL  Place 1 drop into both eyes 2 (two) times daily.  What changed:    · when to take this  · reasons to take this     VITAMIN D3 1,000 unit capsule  Generic drug:  cholecalciferol (vitamin D3)  What changed:  Another medication with the same name was removed. Continue taking this medication, and follow the directions you see here.  Changed by:  Amaya Patton MD        CONTINUE taking these medications    5- mg Cap  Generic dru-hydroxytryptophan (5-HTP)     ALPRAZolam 0.25 MG tablet  Commonly known as:  XANAX     apixaban 5 mg Tab  Commonly known as:  ELIQUIS     aspirin 81 MG EC tablet  Commonly known as:  ECOTRIN     co-enzyme Q-10 30 mg capsule     cyanocobalamin 1000 MCG  tablet  Commonly known as:  VITAMIN B-12     DHEA ORAL     levothyroxine 75 MCG tablet  Commonly known as:  SYNTHROID  Brand only TAKE ONE TABLET BY MOUTH ONE TIME DAILY BEFORE BRRAKFAST     loratadine 10 mg tablet  Commonly known as:  CLARITIN     LORazepam 1 MG tablet  Commonly known as:  ATIVAN     losartan 50 MG tablet  Commonly known as:  COZAAR     FAUSTO (BULK) MISC     metoprolol tartrate 25 MG tablet  Commonly known as:  LOPRESSOR  Take 1 tablet (25 mg total) by mouth 2 (two) times daily.     multivitamin capsule     omeprazole 40 MG capsule  Commonly known as:  PRILOSEC     PHOSPHATIDYL SERINE (BULK) MISC     ranitidine 300 MG tablet  Commonly known as:  ZANTAC     rosuvastatin 5 MG tablet  Commonly known as:  CRESTOR  Take 1 tablet (5 mg total) by mouth once daily.        STOP taking these medications    DHA-PHOSPHATIDYLSERINE ORAL  Stopped by:  Amaya Patton MD            Review of Systems   Constitution: Negative for weakness, malaise/fatigue and weight gain.   HENT: Negative for hearing loss.    Eyes: Negative for visual disturbance.   Cardiovascular: Negative for chest pain, claudication, dyspnea on exertion, leg swelling, near-syncope, orthopnea, palpitations, paroxysmal nocturnal dyspnea and syncope.   Respiratory: Negative for cough, shortness of breath, sleep disturbances due to breathing, snoring and wheezing.    Endocrine: Negative for cold intolerance, heat intolerance, polydipsia, polyphagia and polyuria.   Hematologic/Lymphatic: Negative for bleeding problem. Does not bruise/bleed easily.   Skin: Negative for rash and suspicious lesions.   Musculoskeletal: Negative for arthritis, falls, joint pain, muscle weakness and myalgias.   Gastrointestinal: Negative for abdominal pain, change in bowel habit, constipation, diarrhea, heartburn, hematochezia, melena and nausea.   Genitourinary: Negative for hematuria and nocturia.   Neurological: Negative for excessive daytime sleepiness, dizziness,  "headaches, light-headedness and loss of balance.   Psychiatric/Behavioral: Negative for depression. The patient is not nervous/anxious.    Allergic/Immunologic: Negative for environmental allergies.       /74 (BP Location: Left arm, Patient Position: Sitting, BP Method: Large (Automatic))   Pulse (!) 59   Ht 5' 6" (1.676 m)   Wt 80.3 kg (177 lb 0.5 oz)   BMI 28.57 kg/m²     Objective:   Physical Exam   Constitutional: She is oriented to person, place, and time. She appears well-developed and well-nourished.        HENT:   Head: Normocephalic and atraumatic.   Mouth/Throat: Oropharynx is clear and moist.   Eyes: Conjunctivae and EOM are normal. Pupils are equal, round, and reactive to light. No scleral icterus.   Neck: Normal range of motion. Neck supple. No hepatojugular reflux and no JVD present. No tracheal deviation present. No thyromegaly present.   Cardiovascular: Regular rhythm, normal heart sounds and intact distal pulses. Bradycardia present. PMI is not displaced.   Pulses:       Carotid pulses are 2+ on the right side, and 2+ on the left side.       Radial pulses are 2+ on the right side, and 2+ on the left side.        Dorsalis pedis pulses are 2+ on the right side, and 2+ on the left side.        Posterior tibial pulses are 2+ on the right side, and 2+ on the left side.   Pulmonary/Chest: Effort normal and breath sounds normal.   Abdominal: Soft. Bowel sounds are normal. She exhibits no distension and no mass. There is no hepatosplenomegaly. There is no tenderness.   Musculoskeletal: She exhibits no edema or tenderness.   Lymphadenopathy:     She has no cervical adenopathy.   Neurological: She is alert and oriented to person, place, and time.   Skin: Skin is warm and dry. No rash noted. No cyanosis or erythema. Nails show no clubbing.   Psychiatric: She has a normal mood and affect. Her speech is normal and behavior is normal.       Lab Results   Component Value Date     10/22/2018    K " 4.6 10/22/2018     10/22/2018    CO2 30 (H) 10/22/2018    BUN 16 10/22/2018    CREATININE 0.9 10/22/2018    GLU 91 10/22/2018    MG 2.0 10/22/2018    AST 20 10/22/2018    ALT 20 10/22/2018    ALBUMIN 4.0 10/22/2018    PROT 6.7 10/22/2018    BILITOT 0.7 10/22/2018    WBC 4.40 10/22/2018    HGB 14.0 10/22/2018    HCT 43.6 10/22/2018    MCV 88 10/22/2018     10/22/2018    TSH 0.860 10/22/2018    CHOL 202 (H) 10/22/2018    HDL 83 (H) 10/22/2018    LDLCALC 107.0 10/22/2018    TRIG 60 10/22/2018       Assessment:     1. Essential hypertension : Blood pressure is at goal. I have made no changes. Continue current regimen.   2. ST elevation myocardial infarction involving left anterior descending (LAD) coronary artery : She is on appropriate therapy with asa, crestor, losartan and metoprolol. She would like to do cardiac rehab on the [facility]. I have ordered an echo with bubble study to assess for an intracardiac shunt. She is scheduled for a loop recorder November 29th. If the above work up is negative, we can consider a hypercoaguable work up. I have requested her cath films from [place] to review.       Plan:     Socorro was seen today for chest pain.    Diagnoses and all orders for this visit:    Essential hypertension  -     2D echo with color flow doppler and bubble contrast; Future  -     Hepatic function panel; Future  -     Lipid panel; Future    ST elevation myocardial infarction involving left anterior descending (LAD) coronary artery  -     2D echo with color flow doppler and bubble contrast; Future  -     Hepatic function panel; Future  -     Lipid panel; Future        Thank you for allowing me to participate in this patient's care. Please do not hesitate to contact me with any questions or concerns.

## 2018-10-30 NOTE — LETTER
October 30, 2018      Lonnie Hernandez MD  [address]  [facility] [address]           [facility]  [address]  [facility] [address]  Phone: [phone]          Patient: Socorro Amaro   MR Number: [id number]   YOB: 1955   Date of Visit: 10/30/2018       Dear Dr. Lonnie Hernandez:    Thank you for referring Socorro Amaro to me for evaluation. Attached you will find relevant portions of my assessment and plan of care.    If you have questions, please do not hesitate to call me. I look forward to following Socorro mAaro along with you.    Sincerely,    Amaya Patton MD    Enclosure  CC:  No Recipients    If you would like to receive this communication electronically, please contact [email or web address] or [phone] to request more information on [employer] access.    For providers and/or their staff who would like to refer a patient to [facility], please contact us through our one-stop-shop provider referral line, [facility], at [phone].    If you feel you have received this communication in error or would no longer like to receive these types of communications, please e-mail [email or web address]

## 2018-11-07 ENCOUNTER — PATIENT MESSAGE (OUTPATIENT)
Dept: CARDIOLOGY | Facility: CLINIC | Age: 63
End: 2018-11-07

## 2018-11-13 ENCOUNTER — PATIENT MESSAGE (OUTPATIENT)
Dept: CARDIOLOGY | Facility: CLINIC | Age: 63
End: 2018-11-13

## 2018-11-15 LAB — FUNGUS SPEC CULT: NORMAL

## 2018-11-17 ENCOUNTER — NURSE TRIAGE (OUTPATIENT)
Dept: ADMINISTRATIVE | Facility: CLINIC | Age: 63
End: 2018-11-17

## 2018-11-17 NOTE — TELEPHONE ENCOUNTER
Pt going to [place] for holiday - needs refill of eliquis. Spoke with dr gonzalez- will look at record and contact pharmacy.   Reason for Disposition   Caller has URGENT medication question about med that PCP prescribed and triager unable to answer question    Protocols used: ST MEDICATION QUESTION CALL-A-AH

## 2018-11-19 ENCOUNTER — HOSPITAL ENCOUNTER (OUTPATIENT)
Dept: CARDIOLOGY | Facility: CLINIC | Age: 63
Discharge: HOME | End: 2018-11-19
Attending: INTERNAL MEDICINE
Payer: COMMERCIAL

## 2018-11-19 VITALS
DIASTOLIC BLOOD PRESSURE: 78 MMHG | BODY MASS INDEX: 28.45 KG/M2 | WEIGHT: 177 LBS | HEIGHT: 66 IN | SYSTOLIC BLOOD PRESSURE: 140 MMHG

## 2018-11-19 DIAGNOSIS — I21.02 ST ELEVATION MYOCARDIAL INFARCTION INVOLVING LEFT ANTERIOR DESCENDING (LAD) CORONARY ARTERY: ICD-10-CM

## 2018-11-19 DIAGNOSIS — I10 ESSENTIAL HYPERTENSION: ICD-10-CM

## 2018-11-19 LAB
ASCENDING AORTA: 2.95 CM
AV MEAN GRADIENT: 4.07 MMHG
AV PEAK GRADIENT: 6.15 MMHG
AV VALVE AREA: 3.04 CM2
BSA FOR ECHO PROCEDURE: 1.93 M2
CV ECHO LV RWT: 0.31 CM
DOP CALC AO PEAK VEL: 1.24 M/S
DOP CALC AO VTI: 33.75 CM
DOP CALC LVOT AREA: 4.64 CM2
DOP CALC LVOT DIAMETER: 2.43 CM
DOP CALC LVOT STROKE VOLUME: 102.67 CM3
DOP CALCLVOT PEAK VEL VTI: 22.15 CM
E WAVE DECELERATION TIME: 193.17 MSEC
E/A RATIO: 1.14
E/E' RATIO: 6.95
ECHO LV POSTERIOR WALL: 0.67 CM (ref 0.6–1.1)
FRACTIONAL SHORTENING: 31 % (ref 28–44)
INTERVENTRICULAR SEPTUM: 0.96 CM (ref 0.6–1.1)
LA MAJOR: 4.7 CM
LA MINOR: 4.39 CM
LA WIDTH: 4 CM
LEFT ATRIUM SIZE: 3.51 CM
LEFT ATRIUM VOLUME INDEX: 28.1 ML/M2
LEFT ATRIUM VOLUME: 54.18 CM3
LEFT INTERNAL DIMENSION IN SYSTOLE: 3.04 CM (ref 2.1–4)
LEFT VENTRICLE DIASTOLIC VOLUME INDEX: 45.1 ML/M2
LEFT VENTRICLE DIASTOLIC VOLUME: 87.05 ML
LEFT VENTRICLE MASS INDEX: 57.9 G/M2
LEFT VENTRICLE SYSTOLIC VOLUME INDEX: 18.7 ML/M2
LEFT VENTRICLE SYSTOLIC VOLUME: 36.04 ML
LEFT VENTRICULAR INTERNAL DIMENSION IN DIASTOLE: 4.39 CM (ref 3.5–6)
LEFT VENTRICULAR MASS: 111.72 G
LV LATERAL E/E' RATIO: 6.64
LV SEPTAL E/E' RATIO: 7.3
MV PEAK A VEL: 0.64 M/S
MV PEAK E VEL: 0.73 M/S
PISA TR MAX VEL: 2.47 M/S
PULM VEIN S/D RATIO: 1.1
PV PEAK D VEL: 0.5 M/S
PV PEAK S VEL: 0.55 M/S
RA MAJOR: 3.62 CM
RA PRESSURE: 3 MMHG
RA WIDTH: 2.8 CM
RIGHT VENTRICULAR END-DIASTOLIC DIMENSION: 3.67 CM
RV TISSUE DOPPLER FREE WALL SYSTOLIC VELOCITY 1 (APICAL 4 CHAMBER VIEW): 11.98 M/S
SINUS: 3 CM
STJ: 2.5 CM
TDI LATERAL: 0.11
TDI SEPTAL: 0.1
TDI: 0.11
TR MAX PG: 24.4 MMHG
TRICUSPID ANNULAR PLANE SYSTOLIC EXCURSION: 2.1 CM
TV REST PULMONARY ARTERY PRESSURE: 27.4 MMHG

## 2018-11-19 PROCEDURE — 93306 TTE W/DOPPLER COMPLETE: CPT | Mod: S$GLB,,, | Performed by: INTERNAL MEDICINE

## 2018-11-19 NOTE — NURSING
Patient identified by 2 identifiers.   Allergies reviewed.  22 g IV placed to Lt FA hand.  Bubble study explained to patient, patient verbalized understanding.  Bubble performed X 3, with & without Valsalva.  Pt tolerated procedure well.  IV d/c with catheter intact, pressure dsg applied.

## 2018-11-20 ENCOUNTER — TELEPHONE (OUTPATIENT)
Dept: CARDIOLOGY | Facility: CLINIC | Age: 63
End: 2018-11-20

## 2018-11-20 DIAGNOSIS — Q21.10 ASD (ATRIAL SEPTAL DEFECT): Primary | ICD-10-CM

## 2018-11-20 DIAGNOSIS — I21.02 ST ELEVATION MYOCARDIAL INFARCTION INVOLVING LEFT ANTERIOR DESCENDING (LAD) CORONARY ARTERY: Primary | ICD-10-CM

## 2018-11-20 DIAGNOSIS — Q21.10 ASD (ATRIAL SEPTAL DEFECT): ICD-10-CM

## 2018-11-20 DIAGNOSIS — I25.10 CORONARY ARTERY DISEASE, ANGINA PRESENCE UNSPECIFIED, UNSPECIFIED VESSEL OR LESION TYPE, UNSPECIFIED WHETHER NATIVE OR TRANSPLANTED HEART: ICD-10-CM

## 2018-11-20 NOTE — TELEPHONE ENCOUNTER
I spoke with Ms Amaro and her  regarding her echo results. Her TTE revealed a large right to left shunt, likely an ASD. The shunt was unable to be visualize don 2D imaging and a cardiac CT was recommended for further  Evaluation of a likely sinus venosus ASD. She is already anticoagulated with eliquis. I will order the Cardiac CT and lower extremity dopplers. She will likely need referral to CT surgery pending the results of the CT.

## 2018-11-21 ENCOUNTER — HOSPITAL ENCOUNTER (OUTPATIENT)
Dept: RADIOLOGY | Facility: HOSPITAL | Age: 63
Discharge: HOME | End: 2018-11-21
Attending: INTERNAL MEDICINE
Payer: COMMERCIAL

## 2018-11-21 ENCOUNTER — CLINICAL SUPPORT (OUTPATIENT)
Dept: CARDIOLOGY | Facility: CLINIC | Age: 63
End: 2018-11-21
Attending: INTERNAL MEDICINE
Payer: COMMERCIAL

## 2018-11-21 VITALS — RESPIRATION RATE: 20 BRPM | HEART RATE: 80 BPM | DIASTOLIC BLOOD PRESSURE: 83 MMHG | SYSTOLIC BLOOD PRESSURE: 156 MMHG

## 2018-11-21 DIAGNOSIS — Q21.10 ASD (ATRIAL SEPTAL DEFECT): ICD-10-CM

## 2018-11-21 DIAGNOSIS — I21.02 ST ELEVATION MYOCARDIAL INFARCTION INVOLVING LEFT ANTERIOR DESCENDING (LAD) CORONARY ARTERY: ICD-10-CM

## 2018-11-21 PROCEDURE — 93970 EXTREMITY STUDY: CPT | Mod: S$GLB,,, | Performed by: INTERNAL MEDICINE

## 2018-11-21 PROCEDURE — 25500020 PHARM REV CODE 255: Performed by: INTERNAL MEDICINE

## 2018-11-21 PROCEDURE — 63600175 PHARM REV CODE 636 W HCPCS: Performed by: STUDENT IN AN ORGANIZED HEALTH CARE EDUCATION/TRAINING PROGRAM

## 2018-11-21 PROCEDURE — 75574 CT ANGIO HRT W/3D IMAGE: CPT | Mod: TC

## 2018-11-21 PROCEDURE — 75574 CT ANGIO HRT W/3D IMAGE: CPT | Mod: 26,,, | Performed by: RADIOLOGY

## 2018-11-21 RX ORDER — NITROGLYCERIN 0.4 MG/1
0.4 TABLET SUBLINGUAL ONCE
Status: COMPLETED | OUTPATIENT
Start: 2018-11-21 | End: 2018-11-21

## 2018-11-21 RX ADMIN — IOHEXOL 100 ML: 350 INJECTION, SOLUTION INTRAVENOUS at 02:11

## 2018-11-21 RX ADMIN — NITROGLYCERIN 0.4 MG: 0.4 TABLET SUBLINGUAL at 02:11

## 2018-11-21 NOTE — PROGRESS NOTES
Pt tolerated CTA with V/S stable, no dizziness/lightheaded,no headache. Observed ambulation and given discharge instructions

## 2018-11-23 ENCOUNTER — PATIENT MESSAGE (OUTPATIENT)
Dept: CARDIOLOGY | Facility: CLINIC | Age: 63
End: 2018-11-23

## 2018-11-26 ENCOUNTER — PATIENT MESSAGE (OUTPATIENT)
Dept: CARDIOLOGY | Facility: CLINIC | Age: 63
End: 2018-11-26

## 2018-11-26 ENCOUNTER — TELEPHONE (OUTPATIENT)
Dept: ELECTROPHYSIOLOGY | Facility: CLINIC | Age: 63
End: 2018-11-26

## 2018-11-26 ENCOUNTER — PATIENT MESSAGE (OUTPATIENT)
Dept: MEDSURG UNIT | Facility: HOSPITAL | Age: 63
End: 2018-11-26

## 2018-11-26 NOTE — TELEPHONE ENCOUNTER
----- Message from Shirley Gale RN sent at 11/26/2018 11:48 AM CST -----  Contact: patient called      ----- Message -----  From: Jeanine Espinal MA  Sent: 11/26/2018   9:26 AM  To: Shirley Gale RN    The patient need to talk  to you about her surgery that schedule 11/29/2018.  Please call [phone]. Thank you.

## 2018-11-26 NOTE — TELEPHONE ENCOUNTER
Message sent to patient earlier advising that I will call her as soon as I speak with Dr Hernandez.

## 2018-11-27 ENCOUNTER — PATIENT MESSAGE (OUTPATIENT)
Dept: CARDIOLOGY | Facility: CLINIC | Age: 63
End: 2018-11-27

## 2018-11-28 ENCOUNTER — PATIENT MESSAGE (OUTPATIENT)
Dept: CARDIOLOGY | Facility: CLINIC | Age: 63
End: 2018-11-28

## 2018-11-28 ENCOUNTER — OFFICE VISIT (OUTPATIENT)
Dept: OBSTETRICS AND GYNECOLOGY | Facility: CLINIC | Age: 63
End: 2018-11-28
Attending: OBSTETRICS & GYNECOLOGY
Payer: COMMERCIAL

## 2018-11-28 VITALS
SYSTOLIC BLOOD PRESSURE: 122 MMHG | HEIGHT: 66 IN | BODY MASS INDEX: 28.21 KG/M2 | WEIGHT: 175.5 LBS | DIASTOLIC BLOOD PRESSURE: 78 MMHG

## 2018-11-28 DIAGNOSIS — Q21.10 ASD (ATRIAL SEPTAL DEFECT): Primary | ICD-10-CM

## 2018-11-28 DIAGNOSIS — Z12.4 PAP SMEAR FOR CERVICAL CANCER SCREENING: Primary | ICD-10-CM

## 2018-11-28 DIAGNOSIS — Z01.419 WELL WOMAN EXAM WITH ROUTINE GYNECOLOGICAL EXAM: ICD-10-CM

## 2018-11-28 PROCEDURE — 3074F SYST BP LT 130 MM HG: CPT | Mod: CPTII,S$GLB,, | Performed by: OBSTETRICS & GYNECOLOGY

## 2018-11-28 PROCEDURE — 3078F DIAST BP <80 MM HG: CPT | Mod: CPTII,S$GLB,, | Performed by: OBSTETRICS & GYNECOLOGY

## 2018-11-28 PROCEDURE — 88175 CYTOPATH C/V AUTO FLUID REDO: CPT

## 2018-11-28 PROCEDURE — 87624 HPV HI-RISK TYP POOLED RSLT: CPT

## 2018-11-28 PROCEDURE — 99396 PREV VISIT EST AGE 40-64: CPT | Mod: S$GLB,,, | Performed by: OBSTETRICS & GYNECOLOGY

## 2018-11-28 NOTE — PROGRESS NOTES
Subjective:       Patient ID: Socorro Amaro is a 63 y.o. female.    Chief Complaint:  Well Woman      History of Present Illness  HPIThis 63 yr old female is here for routine exam .  She is doing well as far as GYN.  Had MI last summer and still being worked out.  Had BSO for benign teratoma and pain in 2016.  The pain was resolved with her surgery but has noticed recently dull achy on and off in similar area again.  Having colonoscopy this month.    GYN & OB History  No LMP recorded. Patient is postmenopausal.   Date of Last Pap: 2016    OB History    Para Term  AB Living   0 0 0 0 0 0   SAB TAB Ectopic Multiple Live Births   0 0 0 0               Review of Systems  Review of Systems   Constitutional: Negative for chills and fever.   Respiratory: Negative for shortness of breath.    Cardiovascular: Negative for chest pain.   Gastrointestinal: Negative for abdominal pain, nausea and vomiting.   Genitourinary: Negative for difficulty urinating, dyspareunia, genital sores, menstrual problem, pelvic pain, vaginal bleeding, vaginal discharge and vaginal pain.   Skin: Negative for wound.   Hematological: Negative for adenopathy.           Objective:   Physical Exam:   Constitutional: She is oriented to person, place, and time. She appears well-developed and well-nourished.    HENT:   Head: Normocephalic.    Eyes: EOM are normal.    Neck: Normal range of motion.    Cardiovascular: Normal rate.     Pulmonary/Chest: Effort normal. She exhibits no mass and no tenderness. Right breast exhibits no inverted nipple, no mass, no skin change and no tenderness. Left breast exhibits no inverted nipple, no mass, no skin change and no tenderness.        Abdominal: Soft. She exhibits no distension. There is no tenderness.     Genitourinary: Vagina normal and uterus normal. There is no rash, tenderness or lesion on the right labia. There is no rash, tenderness or lesion on the left labia. Uterus is not tender.  Cervix is normal. Right adnexum displays no mass, no tenderness and no fullness. Left adnexum displays no mass, no tenderness and no fullness. Cervix exhibits no discharge.           Musculoskeletal: Normal range of motion.       Neurological: She is alert and oriented to person, place, and time.    Skin: Skin is warm and dry.    Psychiatric: She has a normal mood and affect.          Assessment:        1. Pap smear for cervical cancer screening    2. Well woman exam with routine gynecological exam               Plan:      Pap today with co testing  Mammogram yearly  Follow up yearly or prn

## 2018-12-04 LAB
CRC RECOMMENDATION EXT: NORMAL
HPV HR 12 DNA CVX QL NAA+PROBE: NEGATIVE
HPV16 AG SPEC QL: NEGATIVE
HPV18 DNA SPEC QL NAA+PROBE: NEGATIVE

## 2018-12-14 ENCOUNTER — ANESTHESIA (OUTPATIENT)
Dept: MEDSURG UNIT | Facility: HOSPITAL | Age: 63
End: 2018-12-14
Payer: COMMERCIAL

## 2018-12-14 ENCOUNTER — ANESTHESIA EVENT (OUTPATIENT)
Dept: MEDSURG UNIT | Facility: HOSPITAL | Age: 63
End: 2018-12-14
Payer: COMMERCIAL

## 2018-12-14 ENCOUNTER — HOSPITAL ENCOUNTER (OUTPATIENT)
Facility: HOSPITAL | Age: 63
Discharge: HOME | End: 2018-12-14
Attending: INTERNAL MEDICINE | Admitting: INTERNAL MEDICINE
Payer: COMMERCIAL

## 2018-12-14 VITALS
DIASTOLIC BLOOD PRESSURE: 76 MMHG | HEART RATE: 57 BPM | SYSTOLIC BLOOD PRESSURE: 139 MMHG | TEMPERATURE: 98 F | OXYGEN SATURATION: 99 % | RESPIRATION RATE: 12 BRPM

## 2018-12-14 DIAGNOSIS — Q21.10 ASD (ATRIAL SEPTAL DEFECT): ICD-10-CM

## 2018-12-14 DIAGNOSIS — Q21.10 ATRIAL SEPTAL DEFECT: ICD-10-CM

## 2018-12-14 LAB
ASCENDING AORTA: 3.1 CM
SINUS: 3.2 CM
STJ: 2.7 CM

## 2018-12-14 PROCEDURE — D9220A PRA ANESTHESIA: Mod: CRNA,,, | Performed by: NURSE ANESTHETIST, CERTIFIED REGISTERED

## 2018-12-14 PROCEDURE — G0379 DIRECT REFER HOSPITAL OBSERV: HCPCS

## 2018-12-14 PROCEDURE — 25000003 PHARM REV CODE 250: Performed by: NURSE ANESTHETIST, CERTIFIED REGISTERED

## 2018-12-14 PROCEDURE — 71000033 HC RECOVERY, INTIAL HOUR

## 2018-12-14 PROCEDURE — 63600175 PHARM REV CODE 636 W HCPCS: Performed by: NURSE ANESTHETIST, CERTIFIED REGISTERED

## 2018-12-14 PROCEDURE — D9220A PRA ANESTHESIA: Mod: ANES,,, | Performed by: ANESTHESIOLOGY

## 2018-12-14 PROCEDURE — G0378 HOSPITAL OBSERVATION PER HR: HCPCS

## 2018-12-14 RX ORDER — SODIUM CHLORIDE 9 MG/ML
INJECTION, SOLUTION INTRAVENOUS CONTINUOUS PRN
Status: DISCONTINUED | OUTPATIENT
Start: 2018-12-14 | End: 2018-12-14

## 2018-12-14 RX ORDER — LIDOCAINE HCL/PF 100 MG/5ML
SYRINGE (ML) INTRAVENOUS
Status: DISCONTINUED | OUTPATIENT
Start: 2018-12-14 | End: 2018-12-14

## 2018-12-14 RX ORDER — PROPOFOL 10 MG/ML
VIAL (ML) INTRAVENOUS CONTINUOUS PRN
Status: DISCONTINUED | OUTPATIENT
Start: 2018-12-14 | End: 2018-12-14

## 2018-12-14 RX ORDER — HYDROMORPHONE HYDROCHLORIDE 1 MG/ML
0.2 INJECTION, SOLUTION INTRAMUSCULAR; INTRAVENOUS; SUBCUTANEOUS EVERY 5 MIN PRN
Status: DISCONTINUED | OUTPATIENT
Start: 2018-12-14 | End: 2018-12-14 | Stop reason: HOSPADM

## 2018-12-14 RX ORDER — HYDROMORPHONE HYDROCHLORIDE 1 MG/ML
0.2 INJECTION, SOLUTION INTRAMUSCULAR; INTRAVENOUS; SUBCUTANEOUS EVERY 5 MIN PRN
Status: DISCONTINUED | OUTPATIENT
Start: 2018-12-14 | End: 2018-12-14

## 2018-12-14 RX ORDER — PROPOFOL 10 MG/ML
VIAL (ML) INTRAVENOUS
Status: DISCONTINUED | OUTPATIENT
Start: 2018-12-14 | End: 2018-12-14

## 2018-12-14 RX ORDER — SODIUM CHLORIDE 0.9 % (FLUSH) 0.9 %
3 SYRINGE (ML) INJECTION
Status: DISCONTINUED | OUTPATIENT
Start: 2018-12-14 | End: 2018-12-14

## 2018-12-14 RX ORDER — FENTANYL CITRATE 50 UG/ML
INJECTION, SOLUTION INTRAMUSCULAR; INTRAVENOUS
Status: DISCONTINUED | OUTPATIENT
Start: 2018-12-14 | End: 2018-12-14

## 2018-12-14 RX ORDER — SODIUM CHLORIDE 0.9 % (FLUSH) 0.9 %
3 SYRINGE (ML) INJECTION
Status: DISCONTINUED | OUTPATIENT
Start: 2018-12-14 | End: 2018-12-14 | Stop reason: HOSPADM

## 2018-12-14 RX ADMIN — FENTANYL CITRATE 50 MCG: 50 INJECTION, SOLUTION INTRAMUSCULAR; INTRAVENOUS at 09:12

## 2018-12-14 RX ADMIN — PROPOFOL 50 MG: 10 INJECTION, EMULSION INTRAVENOUS at 09:12

## 2018-12-14 RX ADMIN — SODIUM CHLORIDE: 0.9 INJECTION, SOLUTION INTRAVENOUS at 09:12

## 2018-12-14 RX ADMIN — LIDOCAINE HYDROCHLORIDE 100 MG: 20 INJECTION, SOLUTION INTRAVENOUS at 09:12

## 2018-12-14 RX ADMIN — PROPOFOL 100 MCG/KG/MIN: 10 INJECTION, EMULSION INTRAVENOUS at 09:12

## 2018-12-14 NOTE — ANESTHESIA PREPROCEDURE EVALUATION
12/14/2018  Socorro Amaro is a 63 y.o., female with HTN and hx of STEMI here for MARINE to evaluate for presence of a PFO.    Past Medical History:   Diagnosis Date    HTN (hypertension)     STEMI (ST elevation myocardial infarction) 06/30/2018    embolic event    Thyroid disease      Lab Results   Component Value Date    WBC 4.40 10/22/2018    HGB 14.0 10/22/2018    HCT 43.6 10/22/2018    MCV 88 10/22/2018     10/22/2018       Chemistry        Component Value Date/Time     10/22/2018 0829    K 4.6 10/22/2018 0829     10/22/2018 0829    CO2 30 (H) 10/22/2018 0829    BUN 16 10/22/2018 0829    CREATININE 0.9 10/22/2018 0829    GLU 91 10/22/2018 0829        Component Value Date/Time    CALCIUM 9.7 10/22/2018 0829    ALKPHOS 80 10/22/2018 0829    AST 20 10/22/2018 0829    ALT 20 10/22/2018 0829    BILITOT 0.7 10/22/2018 0829    ESTGFRAFRICA >60.0 10/22/2018 0829    EGFRNONAA >60.0 10/22/2018 0829            Anesthesia Evaluation    I have reviewed the Patient Summary Reports.     I have reviewed the Medications.     Review of Systems  Anesthesia Hx:  No problems with previous Anesthesia    Social:  Non-Smoker    Cardiovascular:   Exercise tolerance: good Hypertension Past MI   Denies Angina.    Pulmonary:  Pulmonary Normal    Endocrine:   Hypothyroidism        Physical Exam  General:  Well nourished    Airway/Jaw/Neck:  Airway Findings: Mouth Opening: Normal Tongue: Normal  General Airway Assessment: Adult  Mallampati: II  Jaw/Neck Findings:  Neck ROM: Normal ROM      Dental:  Dental Findings: In tact   Chest/Lungs:  Chest/Lungs Findings: Clear to auscultation, Normal Respiratory Rate     Heart/Vascular:  Heart Findings: Rate: Normal  Rhythm: Regular Rhythm  Sounds: Normal        Mental Status:  Mental Status Findings:  Cooperative, Alert and Oriented         Anesthesia Plan  Type of  Anesthesia, risks & benefits discussed:  Anesthesia Type:  general  Patient's Preference:   Intra-op Monitoring Plan: standard ASA monitors  Intra-op Monitoring Plan Comments:   Post Op Pain Control Plan: multimodal analgesia  Post Op Pain Control Plan Comments:   Induction:   IV  Beta Blocker:         Informed Consent: Patient understands risks and agrees with Anesthesia plan.  Questions answered. Anesthesia consent signed with patient.  ASA Score: 2     Day of Surgery Review of History & Physical:    H&P update referred to the surgeon.         Ready For Surgery From Anesthesia Perspective.

## 2018-12-14 NOTE — TRANSFER OF CARE
Anesthesia Transfer of Care Note    Patient: Socorro Amaro    Procedure(s) Performed: Procedure(s) (LRB):  ECHOCARDIOGRAM,TRANSESOPHAGEAL (N/A)    Patient location: Cath Lab    Anesthesia Type: general    Transport from OR: Transported from OR on room air with adequate spontaneous ventilation    Post pain: adequate analgesia    Post assessment: no apparent anesthetic complications and tolerated procedure well    Post vital signs: stable    Level of consciousness: awake and alert    Nausea/Vomiting: no nausea/vomiting    Complications: none    Transfer of care protocol was followed      Last vitals:   Visit Vitals  BP (!) 140/72   Pulse 68   Temp 36.6 °C (97.9 °F) (Skin)   Resp 18   SpO2 100%   Breastfeeding? No

## 2018-12-14 NOTE — DISCHARGE SUMMARY
[facility]  Cardiology  Discharge Summary      Patient Name: Socorro Amaro  MRN: [id number]  Admission Date: 12/14/2018  Hospital Length of Stay: 0 days  Discharge Date and Time:  12/14/2018 10:52 AM  Attending Physician: Amaya Patton MD    Discharging Provider: Jatin Ruth MD  Primary Care Physician: Joy Rice MD    HPI:   63 y.o. female  with PMH of embolic STEMI in her LAD in June while in [place], HTN, palpitations (no afib).  She has not done cardiac rehab and would like to do it at .  [place] from [place] showed and LVEF of 55-60% with apical hypokinesis.  Current echo shows ef 60%, no rwma, + bubble study.  MARINE today to further characterize ASD, CT is also ordered for this reason.     Dysphagia or odynophagia:  No  Liver Disease, esophageal disease, or known varices:  No  Upper GI Bleeding: No  Snoring:  No  Sleep Apnea:  No  Prior neck surgery or radiation:  No  History of anesthetic difficulties:  No  Family history of anesthetic difficulties:  No  Last oral intake:  12 hours ago  Able to move neck in all directions:  Yes     Mallampati:2  ASA:2    Procedure(s) (LRB):  ECHOCARDIOGRAM,TRANSESOPHAGEAL (N/A)     Indwelling Lines/Drains at time of discharge:  Lines/Drains/Airways          None          Hospital Course:  Presence of small Secundum ASD with a negative bubble study    Consults:     Significant Diagnostic Studies: Labs: All labs within the past 24 hours have been reviewed    Pending Diagnostic Studies:     Procedure Component Value Units Date/Time    Transesophageal echo (MARINE) (Cupid Only) [[id number]] Resulted:  12/14/18 0944    Order Status:  Sent Lab Status:  In process Updated:  12/14/18 0945          Final Active Diagnoses:    Diagnosis Date Noted POA    PRINCIPAL PROBLEM:  Atrial septal defect [Q21.1] 12/14/2018 Not Applicable      Problems Resolved During this Admission:     No new Assessment & Plan notes have been filed under this hospital service  since the last note was generated.  Service: Cardiology      Discharged Condition: stable    Disposition: Home or Self Care    Follow Up: With Dr Mir as scheduled    Patient Instructions:   No discharge procedures on file.  Medications:  Reconciled Home Medications:      Medication List      CHANGE how you take these medications    olopatadine 0.1 % ophthalmic solution  Commonly known as:  PATANOL  Place 1 drop into both eyes 2 (two) times daily.  What changed:    · when to take this  · reasons to take this        CONTINUE taking these medications    5- mg Cap  Generic dru-hydroxytryptophan (5-HTP)  Take by mouth every evening.     ALPRAZolam 0.25 MG tablet  Commonly known as:  XANAX  TAKE 1 TABLET BY MOUTH 1 hour before flight     apixaban 5 mg Tab  Commonly known as:  ELIQUIS  Take 1 tablet (5 mg total) by mouth 2 (two) times daily.     aspirin 81 MG EC tablet  Commonly known as:  ECOTRIN  Take 81 mg by mouth once daily.     co-enzyme Q-10 30 mg capsule  Take 300 mg by mouth once daily.     cyanocobalamin 1000 MCG tablet  Commonly known as:  VITAMIN B-12  Take 5,000 mcg by mouth once daily.     DHEA ORAL  Take by mouth once daily. 50 mg     levothyroxine 75 MCG tablet  Commonly known as:  SYNTHROID  Brand only TAKE ONE TABLET BY MOUTH ONE TIME DAILY BEFORE BRRAKFAST     loratadine 10 mg tablet  Commonly known as:  CLARITIN  Allerclear 10 mg tablet   Take 1 tablet as needed by oral route.     LORazepam 1 MG tablet  Commonly known as:  ATIVAN  as needed.     losartan 50 MG tablet  Commonly known as:  COZAAR  Take 50 mg by mouth once daily.     FAUSTO (BULK) MISC  fausto (bulk)     metoprolol tartrate 25 MG tablet  Commonly known as:  LOPRESSOR  Take 1 tablet (25 mg total) by mouth 2 (two) times daily.     multivitamin capsule  Take 1 capsule by mouth once daily.     omeprazole 40 MG capsule  Commonly known as:  PRILOSEC  omeprazole 40 mg capsule,delayed release   Take 1 capsule every day by oral route.      PHOSPHATIDYL SERINE (BULK) MISC  1 capsule by Misc.(Non-Drug; Combo Route) route once daily. 150 mg per capsule     ranitidine 300 MG tablet  Commonly known as:  ZANTAC  ranitidine 300 mg tablet as needed     rosuvastatin 5 MG tablet  Commonly known as:  CRESTOR  Take 1 tablet (5 mg total) by mouth once daily.     VITAMIN D3 1,000 unit capsule  Generic drug:  cholecalciferol (vitamin D3)  Vitamin D3            Time spent on the discharge of patient: 35 minutes    Jatin Ruth MD  Cardiology  [facility]

## 2018-12-14 NOTE — ANESTHESIA POSTPROCEDURE EVALUATION
Anesthesia Post Evaluation    Patient: Socorro Amaro    Procedure(s) Performed: Procedure(s) (LRB):  ECHOCARDIOGRAM,TRANSESOPHAGEAL (N/A)    Final Anesthesia Type: general  Patient location during evaluation: telemetry step down  Patient participation: Yes- Able to Participate  Level of consciousness: awake and alert  Post-procedure vital signs: reviewed and stable  Pain management: adequate  Airway patency: patent  PONV status at discharge: No PONV  Anesthetic complications: no      Cardiovascular status: blood pressure returned to baseline  Respiratory status: spontaneous ventilation  Hydration status: euvolemic  Follow-up not needed.        Visit Vitals  /76   Pulse (!) 57   Temp 36.7 °C (98 °F) (Skin)   Resp 12   SpO2 99%   Breastfeeding? No       Pain/Curtis Score: No Data Recorded

## 2018-12-14 NOTE — SUBJECTIVE & OBJECTIVE
Past Medical History:   Diagnosis Date    HTN (hypertension)     STEMI (ST elevation myocardial infarction) 06/30/2018    embolic event    Thyroid disease        Past Surgical History:   Procedure Laterality Date    CARDIAC CATHETERIZATION      Embolic occlusions of LAD, No other CAD    CORONARY ANGIOPLASTY  06/2018    LAD thrombectomy in [place]    PARTIAL HYSTERECTOMY  12/2016    RHINOPLASTY TIP      2006/2006    ETALGATA-[id number]-TIRIPGNFBGHZ Bilateral 12/30/2016    Performed by Sarah Martinez MD at [facility], [place]    TONSILLECTOMY, ADENOIDECTOMY         Review of patient's allergies indicates:   Allergen Reactions    Erythromycin Nausea And Vomiting     Severe vomiting       No current facility-administered medications on file prior to encounter.      Current Outpatient Medications on File Prior to Encounter   Medication Sig    5-hydroxytryptophan (5-HTP) 100 mg Cap Take by mouth every evening.     apixaban (ELIQUIS) 5 mg Tab Take 1 tablet (5 mg total) by mouth 2 (two) times daily.    aspirin (ECOTRIN) 81 MG EC tablet Take 81 mg by mouth once daily.    cholecalciferol, vitamin D3, (VITAMIN D3) 1,000 unit capsule Vitamin D3    co-enzyme Q-10 30 mg capsule Take 300 mg by mouth once daily.    cyanocobalamin (VITAMIN B-12) 1000 MCG tablet Take 5,000 mcg by mouth once daily.    levothyroxine (SYNTHROID) 75 MCG tablet Brand only TAKE ONE TABLET BY MOUTH ONE TIME DAILY BEFORE BRRAKFAST    loratadine (CLARITIN) 10 mg tablet Allerclear 10 mg tablet   Take 1 tablet as needed by oral route.    losartan (COZAAR) 50 MG tablet Take 50 mg by mouth once daily.    FAUSTO, BULK, MISC fausto (bulk)    metoprolol tartrate (LOPRESSOR) 25 MG tablet Take 1 tablet (25 mg total) by mouth 2 (two) times daily.    multivitamin capsule Take 1 capsule by mouth once daily.    olopatadine (PATANOL) 0.1 % ophthalmic solution Place 1 drop into both eyes 2 (two) times daily. (Patient taking differently: Place 1 drop into  both eyes daily as needed. )    omeprazole (PRILOSEC) 40 MG capsule omeprazole 40 mg capsule,delayed release   Take 1 capsule every day by oral route.    PHOSPHATIDYL SERINE, BULK, MISC 1 capsule by Misc.(Non-Drug; Combo Route) route once daily. 150 mg per capsule    PRASTERONE, DHEA, (DHEA ORAL) Take by mouth once daily. 50 mg    rosuvastatin (CRESTOR) 5 MG tablet Take 1 tablet (5 mg total) by mouth once daily.    ALPRAZolam (XANAX) 0.25 MG tablet TAKE 1 TABLET BY MOUTH 1 hour before flight    lorazepam (ATIVAN) 1 MG tablet as needed.     ranitidine (ZANTAC) 300 MG tablet ranitidine 300 mg tablet as needed     Family History     Problem Relation (Age of Onset)    Arthritis Mother    Cancer Mother (72)    Depression Sister, Sister    Heart disease Father    Hyperlipidemia Mother    Hypertension Mother, Brother, Father        Tobacco Use    Smoking status: Never Smoker    Smokeless tobacco: Never Used   Substance and Sexual Activity    Alcohol use: Yes     Alcohol/week: 1.8 oz     Types: 1 Glasses of wine, 1 Cans of beer, 1 Shots of liquor per week     Comment: 5x week    Drug use: Not on file    Sexual activity: Yes     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   HENT: Negative.    Eyes: Negative.    Cardiovascular: Negative.    Respiratory: Negative.    Endocrine: Negative.    Skin: Negative.    Musculoskeletal: Negative.    Gastrointestinal: Negative.    Genitourinary: Negative.    Neurological: Negative.      Objective:     Vital Signs (Most Recent):  Temp: 98 °F (36.7 °C) (12/14/18 1015)  Pulse: (!) 57 (12/14/18 1015)  Resp: 12 (12/14/18 1015)  BP: 139/76 (12/14/18 1015)  SpO2: 99 % (12/14/18 1015) Vital Signs (24h Range):  Temp:  [97 °F (36.1 °C)-98 °F (36.7 °C)] 98 °F (36.7 °C)  Pulse:  [57-68] 57  Resp:  [12-18] 12  SpO2:  [97 %-100 %] 99 %  BP: (116-149)/(57-79) 139/76        There is no height or weight on file to calculate  BMI.    SpO2: 99 %  O2 Device (Oxygen Therapy): room air      Intake/Output Summary (Last 24 hours) at 12/14/2018 1048  Last data filed at 12/14/2018 0947  Gross per 24 hour   Intake 500 ml   Output --   Net 500 ml       Lines/Drains/Airways     Peripheral Intravenous Line                 Peripheral IV - Single Lumen 12/14/18 0829 Right Hand less than 1 day                Physical Exam   Constitutional: She is oriented to person, place, and time. She appears well-developed and well-nourished.   HENT:   Head: Normocephalic and atraumatic.   Eyes: Conjunctivae and EOM are normal. Pupils are equal, round, and reactive to light.   Neck: Normal range of motion. Neck supple. No JVD present.   Cardiovascular: Normal rate, regular rhythm and normal heart sounds. Exam reveals no gallop and no friction rub.   No murmur heard.  Pulmonary/Chest: Effort normal and breath sounds normal. No respiratory distress. She has no wheezes. She has no rales. She exhibits no tenderness.   Abdominal: Soft. Bowel sounds are normal. She exhibits no distension. There is no tenderness.   Musculoskeletal: Normal range of motion. She exhibits no edema or tenderness.   Neurological: She is alert and oriented to person, place, and time.   Skin: Skin is warm and dry. No erythema. No pallor.       Significant Labs:   Recent Lab Results     None          Significant Imaging: as per hpi

## 2018-12-14 NOTE — H&P
[facility]  Cardiology  History and Physical     Patient Name: Socorro Amaro  MRN: [id number]  Admission Date: 12/14/2018  Code Status: No Order   Attending Provider: Amaya Patton MD   Primary Care Physician: Joy Rice MD  Principal Problem:<principal problem not specified>    Patient information was obtained from patient and past medical records.     Subjective:     Chief Complaint:  ASD     HPI:  63 y.o. female  with PMH of embolic STEMI in her LAD in June while in [place], HTN, palpitations (no afib).  She has not done cardiac rehab and would like to do it at .  [place] from [place] showed and LVEF of 55-60% with apical hypokinesis.  Current echo shows ef 60%, no rwma, + bubble study.  MARINE today to further characterize ASD, CT is also ordered for this reason.     Dysphagia or odynophagia:  No  Liver Disease, esophageal disease, or known varices:  No  Upper GI Bleeding: No  Snoring:  No  Sleep Apnea:  No  Prior neck surgery or radiation:  No  History of anesthetic difficulties:  No  Family history of anesthetic difficulties:  No  Last oral intake:  12 hours ago  Able to move neck in all directions:  Yes     Mallampati:2  ASA:2    Past Medical History:   Diagnosis Date    HTN (hypertension)     STEMI (ST elevation myocardial infarction) 06/30/2018    embolic event    Thyroid disease        Past Surgical History:   Procedure Laterality Date    CARDIAC CATHETERIZATION      Embolic occlusions of LAD, No other CAD    CORONARY ANGIOPLASTY  06/2018    LAD thrombectomy in [place]    PARTIAL HYSTERECTOMY  12/2016    RHINOPLASTY TIP      2006/2006    MEJFJIYO-UDCZXRETLLVG-VJPHIIYGWITF Bilateral 12/30/2016    Performed by Sarah Martinez MD at [facility], [place] OR    TONSILLECTOMY, ADENOIDECTOMY         Review of patient's allergies indicates:   Allergen Reactions    Erythromycin Nausea And Vomiting     Severe vomiting       No current facility-administered medications on file prior to  encounter.      Current Outpatient Medications on File Prior to Encounter   Medication Sig    5-hydroxytryptophan (5-HTP) 100 mg Cap Take by mouth every evening.     apixaban (ELIQUIS) 5 mg Tab Take 1 tablet (5 mg total) by mouth 2 (two) times daily.    aspirin (ECOTRIN) 81 MG EC tablet Take 81 mg by mouth once daily.    cholecalciferol, vitamin D3, (VITAMIN D3) 1,000 unit capsule Vitamin D3    co-enzyme Q-10 30 mg capsule Take 300 mg by mouth once daily.    cyanocobalamin (VITAMIN B-12) 1000 MCG tablet Take 5,000 mcg by mouth once daily.    levothyroxine (SYNTHROID) 75 MCG tablet Brand only TAKE ONE TABLET BY MOUTH ONE TIME DAILY BEFORE BRRAKFAST    loratadine (CLARITIN) 10 mg tablet Allerclear 10 mg tablet   Take 1 tablet as needed by oral route.    losartan (COZAAR) 50 MG tablet Take 50 mg by mouth once daily.    FAUSTO, BULK, MISC fausto (bulk)    metoprolol tartrate (LOPRESSOR) 25 MG tablet Take 1 tablet (25 mg total) by mouth 2 (two) times daily.    multivitamin capsule Take 1 capsule by mouth once daily.    olopatadine (PATANOL) 0.1 % ophthalmic solution Place 1 drop into both eyes 2 (two) times daily. (Patient taking differently: Place 1 drop into both eyes daily as needed. )    omeprazole (PRILOSEC) 40 MG capsule omeprazole 40 mg capsule,delayed release   Take 1 capsule every day by oral route.    PHOSPHATIDYL SERINE, BULK, MISC 1 capsule by Misc.(Non-Drug; Combo Route) route once daily. 150 mg per capsule    PRASTERONE, DHEA, (DHEA ORAL) Take by mouth once daily. 50 mg    rosuvastatin (CRESTOR) 5 MG tablet Take 1 tablet (5 mg total) by mouth once daily.    ALPRAZolam (XANAX) 0.25 MG tablet TAKE 1 TABLET BY MOUTH 1 hour before flight    lorazepam (ATIVAN) 1 MG tablet as needed.     ranitidine (ZANTAC) 300 MG tablet ranitidine 300 mg tablet as needed     Family History     Problem Relation (Age of Onset)    Arthritis Mother    Cancer Mother (72)    Depression Sister, Sister    Heart disease  Father    Hyperlipidemia Mother    Hypertension Mother, Brother, Father        Tobacco Use    Smoking status: Never Smoker    Smokeless tobacco: Never Used   Substance and Sexual Activity    Alcohol use: Yes     Alcohol/week: 1.8 oz     Types: 1 Glasses of wine, 1 Cans of beer, 1 Shots of liquor per week     Comment: 5x week    Drug use: Not on file    Sexual activity: Yes     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   HENT: Negative.    Eyes: Negative.    Cardiovascular: Negative.    Respiratory: Negative.    Endocrine: Negative.    Skin: Negative.    Musculoskeletal: Negative.    Gastrointestinal: Negative.    Genitourinary: Negative.    Neurological: Negative.      Objective:     Vital Signs (Most Recent):  Temp: 98 °F (36.7 °C) (12/14/18 1015)  Pulse: (!) 57 (12/14/18 1015)  Resp: 12 (12/14/18 1015)  BP: 139/76 (12/14/18 1015)  SpO2: 99 % (12/14/18 1015) Vital Signs (24h Range):  Temp:  [97 °F (36.1 °C)-98 °F (36.7 °C)] 98 °F (36.7 °C)  Pulse:  [57-68] 57  Resp:  [12-18] 12  SpO2:  [97 %-100 %] 99 %  BP: (116-149)/(57-79) 139/76        There is no height or weight on file to calculate BMI.    SpO2: 99 %  O2 Device (Oxygen Therapy): room air      Intake/Output Summary (Last 24 hours) at 12/14/2018 1048  Last data filed at 12/14/2018 0947  Gross per 24 hour   Intake 500 ml   Output --   Net 500 ml       Lines/Drains/Airways     Peripheral Intravenous Line                 Peripheral IV - Single Lumen 12/14/18 0829 Right Hand less than 1 day                Physical Exam   Constitutional: She is oriented to person, place, and time. She appears well-developed and well-nourished.   HENT:   Head: Normocephalic and atraumatic.   Eyes: Conjunctivae and EOM are normal. Pupils are equal, round, and reactive to light.   Neck: Normal range of motion. Neck supple. No JVD present.   Cardiovascular: Normal rate, regular rhythm and normal heart sounds.  Exam reveals no gallop and no friction rub.   No murmur heard.  Pulmonary/Chest: Effort normal and breath sounds normal. No respiratory distress. She has no wheezes. She has no rales. She exhibits no tenderness.   Abdominal: Soft. Bowel sounds are normal. She exhibits no distension. There is no tenderness.   Musculoskeletal: Normal range of motion. She exhibits no edema or tenderness.   Neurological: She is alert and oriented to person, place, and time.   Skin: Skin is warm and dry. No erythema. No pallor.       Significant Labs:   Recent Lab Results     None          Significant Imaging: as per hpi    Assessment and Plan:     Atrial septal defect     1. MARINE with bubble study for evaluation of ASD and further characterization.     -The risks, benefits & alternatives of the procedure were explained to the patient.    -The risks of transesophageal echo include but are not limited to:  Dental trauma, esophageal trauma/perforation, bleeding, laryngospasm/brochospasm, aspiration, sore throat/hoarseness, & dislodgement of the endotracheal tube/nasogastric tube (where applicable).    -The risks of moderate sedation include hypotension, respiratory depression, arrhythmias, bronchospasm, & death.    -Informed consent was obtained & the patient is agreeable to proceed with the procedure.     I will discuss with the attending physician. Attending addendum is to follow.      Further recommendations per attending addendum     Jatin Ruth MD  PGY-V Cardiology Fellow  [phone]

## 2018-12-14 NOTE — NURSING
Pt ambulated on unit from PACU.  Pt accompanied by JOEL Cifuentes.  Denies pain or SOB.  VSS.  Swallows without difficulty.  Will continue to monitor.

## 2018-12-14 NOTE — NURSING
Ambulated on unit this morning by self.  Pt states spouse is in the waiting area.  Verbalized understanding of procedure. No c/o pain.  Will continue to monitor.

## 2018-12-14 NOTE — HPI
63 y.o. female  with PMH of embolic STEMI in her LAD in June while in [place], HTN, palpitations (no afib).  She has not done cardiac rehab and would like to do it at .  Her echo from [place] showed and LVEF of 55-60% with apical hypokinesis.  Current echo shows ef 60%, no rwma, + bubble study.  MARINE today to further characterize ASD, CT is also ordered for this reason.     Dysphagia or odynophagia:  No  Liver Disease, esophageal disease, or known varices:  No  Upper GI Bleeding: No  Snoring:  No  Sleep Apnea:  No  Prior neck surgery or radiation:  No  History of anesthetic difficulties:  No  Family history of anesthetic difficulties:  No  Last oral intake:  12 hours ago  Able to move neck in all directions:  Yes     Mallampati:2  ASA:2

## 2018-12-17 ENCOUNTER — TELEPHONE (OUTPATIENT)
Dept: CARDIOLOGY | Facility: CLINIC | Age: 63
End: 2018-12-17

## 2018-12-17 NOTE — TELEPHONE ENCOUNTER
----- Message from Amaya Patton MD sent at 12/17/2018  3:04 PM CST -----  The MARINE showed a small ASD. Please refer her to interventional cardiology to be evaluated for percutaneous closure given her embolic STEMI.

## 2019-01-28 ENCOUNTER — INITIAL CONSULT (OUTPATIENT)
Dept: CARDIOLOGY | Facility: CLINIC | Age: 64
End: 2019-01-28
Payer: COMMERCIAL

## 2019-01-28 ENCOUNTER — EDUCATION (OUTPATIENT)
Dept: CARDIOLOGY | Facility: CLINIC | Age: 64
End: 2019-01-28

## 2019-01-28 VITALS
OXYGEN SATURATION: 98 % | HEIGHT: 66 IN | SYSTOLIC BLOOD PRESSURE: 135 MMHG | BODY MASS INDEX: 28.27 KG/M2 | DIASTOLIC BLOOD PRESSURE: 67 MMHG | WEIGHT: 175.94 LBS | HEART RATE: 63 BPM

## 2019-01-28 DIAGNOSIS — I21.02 ST ELEVATION MYOCARDIAL INFARCTION INVOLVING LEFT ANTERIOR DESCENDING (LAD) CORONARY ARTERY: ICD-10-CM

## 2019-01-28 DIAGNOSIS — Q21.10 ASD (ATRIAL SEPTAL DEFECT): Primary | ICD-10-CM

## 2019-01-28 DIAGNOSIS — R00.2 PALPITATIONS: ICD-10-CM

## 2019-01-28 DIAGNOSIS — I10 ESSENTIAL HYPERTENSION: ICD-10-CM

## 2019-01-28 DIAGNOSIS — E03.4 HYPOTHYROIDISM DUE TO ACQUIRED ATROPHY OF THYROID: ICD-10-CM

## 2019-01-28 PROCEDURE — 3008F BODY MASS INDEX DOCD: CPT | Mod: CPTII,S$GLB,, | Performed by: INTERNAL MEDICINE

## 2019-01-28 PROCEDURE — 3078F DIAST BP <80 MM HG: CPT | Mod: CPTII,S$GLB,, | Performed by: INTERNAL MEDICINE

## 2019-01-28 PROCEDURE — 99999 PR PBB SHADOW E&M-EST. PATIENT-LVL III: CPT | Mod: PBBFAC,,, | Performed by: INTERNAL MEDICINE

## 2019-01-28 PROCEDURE — 99999 PR PBB SHADOW E&M-EST. PATIENT-LVL III: ICD-10-PCS | Mod: PBBFAC,,, | Performed by: INTERNAL MEDICINE

## 2019-01-28 PROCEDURE — 3075F PR MOST RECENT SYSTOLIC BLOOD PRESS GE 130-139MM HG: ICD-10-PCS | Mod: CPTII,S$GLB,, | Performed by: INTERNAL MEDICINE

## 2019-01-28 PROCEDURE — 3008F PR BODY MASS INDEX (BMI) DOCUMENTED: ICD-10-PCS | Mod: CPTII,S$GLB,, | Performed by: INTERNAL MEDICINE

## 2019-01-28 PROCEDURE — 3075F SYST BP GE 130 - 139MM HG: CPT | Mod: CPTII,S$GLB,, | Performed by: INTERNAL MEDICINE

## 2019-01-28 PROCEDURE — 99214 OFFICE O/P EST MOD 30 MIN: CPT | Mod: S$GLB,,, | Performed by: INTERNAL MEDICINE

## 2019-01-28 PROCEDURE — 99214 PR OFFICE/OUTPT VISIT, EST, LEVL IV, 30-39 MIN: ICD-10-PCS | Mod: S$GLB,,, | Performed by: INTERNAL MEDICINE

## 2019-01-28 PROCEDURE — 3078F PR MOST RECENT DIASTOLIC BLOOD PRESSURE < 80 MM HG: ICD-10-PCS | Mod: CPTII,S$GLB,, | Performed by: INTERNAL MEDICINE

## 2019-01-28 RX ORDER — LOTEPREDNOL ETABONATE 2 MG/ML
SUSPENSION/ DROPS OPHTHALMIC DAILY
COMMUNITY
End: 2022-02-18

## 2019-01-28 RX ORDER — DEXTROSE MONOHYDRATE AND SODIUM CHLORIDE 5; .45 G/100ML; G/100ML
INJECTION, SOLUTION INTRAVENOUS CONTINUOUS
Status: CANCELLED | OUTPATIENT
Start: 2019-01-28

## 2019-01-28 RX ORDER — ZOSTER VACCINE RECOMBINANT, ADJUVANTED 50 MCG/0.5
KIT INTRAMUSCULAR
Refills: 0 | COMMUNITY
Start: 2019-01-21 | End: 2019-07-03

## 2019-01-28 RX ORDER — DIPHENHYDRAMINE HCL 25 MG
50 CAPSULE ORAL ONCE
Status: CANCELLED | OUTPATIENT
Start: 2019-01-28 | End: 2019-01-28

## 2019-01-28 NOTE — PROGRESS NOTES
Subjective:    Patient ID:  Socorro Amaro is a 63 y.o. lady who presents for evaluation of closure of small secundum ASD with L-R shunt as well as a PFO with R-L shunt.      Referring Physician: Joy Rice MD/ Dr. Chandu Morales  Electrophysiologist: Lonnie Hernandez MD    HPI   62 yo o F with PMHx of thromboembolic event and found to have a small secundum ASD with Left to Right shunt on MARINE and a small PFO with Right to Left shunt on TTE with bubble study here for ASD closure evaluation. She was seen in Oct, 2018 for LAAOD implantation but deemed to have no indication for it however Echo was concerning for ASD/PFO and was confirmed on MARINE dated 12/14/19.    She has PMHx of essential HTN, dyslipidemia, CAD s/p STEMI 07/12/18 due to embolic occlusion of LAD s/p thrombectomy without stenting and pAF on Eliquis though no objective evidence of pAF noted. She was diagnosed with pAF in 2016 but started anticoagulation after STEMIn in 6/30/2018.     Her CHADsVASc score is 4     Past Medical History:   Diagnosis Date    HTN (hypertension)     STEMI (ST elevation myocardial infarction) 06/30/2018    embolic event    Thyroid disease      Current Outpatient Medications on File Prior to Visit   Medication Sig Dispense Refill    apixaban (ELIQUIS) 5 mg Tab Take 1 tablet (5 mg total) by mouth 2 (two) times daily. 60 tablet 11    aspirin (ECOTRIN) 81 MG EC tablet Take 81 mg by mouth once daily.      co-enzyme Q-10 30 mg capsule Take 300 mg by mouth once daily.      cyanocobalamin (VITAMIN B-12) 1000 MCG tablet Take 5,000 mcg by mouth once daily.      levothyroxine (SYNTHROID) 75 MCG tablet Brand only TAKE ONE TABLET BY MOUTH ONE TIME DAILY BEFORE BRRAKFAST 90 tablet 3    loratadine (CLARITIN) 10 mg tablet Allerclear 10 mg tablet   Take 1 tablet as needed by oral route.      losartan (COZAAR) 50 MG tablet Take 50 mg by mouth once daily.  2    metoprolol tartrate (LOPRESSOR) 25 MG tablet Take 1 tablet (25 mg  "total) by mouth 2 (two) times daily. 180 tablet 3    multivitamin capsule Take 1 capsule by mouth once daily.      omeprazole (PRILOSEC) 40 MG capsule omeprazole 40 mg capsule,delayed release   Take 1 capsule every day by oral route.      PHOSPHATIDYL SERINE, BULK, MISC 1 capsule by Misc.(Non-Drug; Combo Route) route once daily. 150 mg per capsule      PRASTERONE, DHEA, (DHEA ORAL) Take by mouth once daily. 50 mg      ranitidine (ZANTAC) 300 MG tablet ranitidine 300 mg tablet as needed 90 tablet 3    rosuvastatin (CRESTOR) 5 MG tablet Take 1 tablet (5 mg total) by mouth once daily. 90 tablet 3    5-hydroxytryptophan (5-HTP) 100 mg Cap Take by mouth every evening.       ALPRAZolam (XANAX) 0.25 MG tablet TAKE 1 TABLET BY MOUTH 1 hour before flight  0    cholecalciferol, vitamin D3, (VITAMIN D3) 1,000 unit capsule Vitamin D3      flu vac [id number] 36mos up,PF, (FLUZONE QUAD [id number], PF,) 60 mcg (15 mcg x 4)/0.5 mL Syrg Fluzone Quad 2018-19(PF) 60 mcg(15 mcgx4)/0.5 mL intramuscular syringe      lorazepam (ATIVAN) 1 MG tablet as needed.       loteprednol (ALREX) 0.2 % DrpS Alrex 0.2 % eye drops,suspension      FAUSTO, BULK, MISC fausto (bulk)      olopatadine (PATANOL) 0.1 % ophthalmic solution Place 1 drop into both eyes 2 (two) times daily. (Patient taking differently: Place 1 drop into both eyes daily as needed. ) 5 mL 6    SHINGRIX, PF, 50 mcg/0.5 mL injection TO BE ADMINISTERED BY PHARMACIST  0     No current facility-administered medications on file prior to visit.      Vitals:    01/28/19 1138 01/28/19 1140   BP: 133/68 135/67   BP Location: Left arm Right arm   Patient Position: Sitting Sitting   BP Method: Large (Automatic) Large (Automatic)   Pulse: (!) 58 63   SpO2: 98%    Weight: 79.8 kg (175 lb 14.8 oz)    Height: 5' 6" (1.676 m)      Body mass index is 28.4 kg/m².          Review of Systems   Constitution: Negative for chills, decreased appetite, fever, weakness, night sweats, weight gain and " weight loss.   HENT: Negative for congestion, hoarse voice, stridor and tinnitus.    Eyes: Negative for blurred vision, pain and visual disturbance.   Cardiovascular: Negative for chest pain, claudication, dyspnea on exertion, irregular heartbeat, leg swelling, near-syncope, orthopnea, palpitations, paroxysmal nocturnal dyspnea and syncope.   Respiratory: Negative for cough, hemoptysis, shortness of breath, snoring and wheezing.    Endocrine: Negative for cold intolerance, heat intolerance and polyuria.   Hematologic/Lymphatic: Positive for bleeding problem. Bruises/bleeds easily.   Skin: Negative for color change and rash.   Musculoskeletal: Negative for arthritis, back pain, joint pain, muscle cramps, myalgias and stiffness.   Gastrointestinal: Negative for abdominal pain, anorexia, constipation, diarrhea, dysphagia, heartburn, hemorrhoids, melena, nausea and vomiting.   Genitourinary: Negative for frequency, hematuria, hesitancy, nocturia and urgency.   Neurological: Negative for difficulty with concentration, dizziness, focal weakness, headaches, light-headedness, numbness, seizures, tremors and vertigo.   Psychiatric/Behavioral: Negative for altered mental status and depression. The patient does not have insomnia.    Allergic/Immunologic: Negative for hives.          Objective:    Physical Exam   Constitutional: She appears well-developed and well-nourished.   HENT:   Head: Normocephalic and atraumatic.   Right Ear: External ear normal.   Left Ear: External ear normal.   Eyes: Conjunctivae and EOM are normal. Pupils are equal, round, and reactive to light.   Neck: Normal range of motion. Neck supple. No JVD present. No thyromegaly present.   Cardiovascular: Normal rate, regular rhythm, S1 normal, S2 normal, normal heart sounds and intact distal pulses. Exam reveals no gallop, no S3 and no friction rub.   No murmur heard.  Pulses:       Radial pulses are 2+ on the right side, and 2+ on the left side.         Femoral pulses are 2+ on the right side, and 2+ on the left side.       Dorsalis pedis pulses are 2+ on the right side, and 2+ on the left side.        Posterior tibial pulses are 2+ on the right side, and 2+ on the left side.   Pulmonary/Chest: Effort normal. No stridor. She has no wheezes. She has no rales. She exhibits no tenderness.   Abdominal: Soft. Bowel sounds are normal. She exhibits no distension. There is no tenderness. There is no rebound and no guarding.   Musculoskeletal: Normal range of motion. She exhibits no edema or tenderness.   Psychiatric: She has a normal mood and affect.     Assessment:       1. PFO with Right to left shunt on TTE with bubble study and a thromboembolic event in recent past on anticoagulation - Will benefit from PFO closure      2. Seccundum ASD with Left to Right shunt on MARINE.   3. Essential hypertension    4. S/P laparoscopic BSO (bilateral salpingo-oophorectomy)    5. Hypothyroidism due to acquired atrophy of thyroid    6.      Chronic anticoagulation.      Plan:     Will proceed with PFO closure with ICE. Will see if will be able to close both ASD/PFO with single device.      Right CFV 7F and 9F access. Small sizing balloon.  Will switch to DaPT for 3-6 months after device implantation.  Hold Eliquis 3 days pre procedure.      - Pre-cath IVF ordered  -The risks, benefits and alternatives of the procedure were explained to the patient.   -The risks of closure device implantation include but are not limited to: bleeding, infection, heart rhythm abnormalities, allergic reactions, kidney injury, stroke and death.   -The risks of moderate sedation include hypotension, respiratory depression, arrhythmias, bronchospasm, and death.   - Informed consent was obtained and the patient is agreeable to proceed with the procedure.        Dany Burgess MD  PGY-9  Interventional Cardiology/Structural Fellow     Staff:  I have personally taken the history and examined this patient and agree  with the fellow's note as stated above and amended it accordingly :-) This patient has no evidence of afib and her embolic MI was likely related to her PFO and ASD.  Will Close both if found percutaneously with ICE and flouroscopy.

## 2019-01-28 NOTE — PROGRESS NOTES
OUTPATIENT CATHETERIZATION INSTRUCTIONS    You have been scheduled for a procedure in the catheterization lab on Thursday, March 7, 2019.     Please report to the Cardiology Waiting Area on the Third floor of the hospital and check in at 8 AM.   You will then be taken to the SSCU (Short Stay Cardiac Unit) and prepared for your procedure. Please be aware that this is not the time of your procedure but the time you are to arrive. The procedures are scheduled on an hourly basis; however, emergency cases take precedence over all other cases.       You may not have anything to eat or drink after midnight the night before your test. You may take your regular morning medications with water. If there are any medications that you should not take you will be instructed to hold them that morning. If you are diabetic and on Metformin (Glucophage) do not take it the day before, the day of, and for 2 days after your procedure.      The procedure will take 1-2 hours to perform. After the procedure, you will return to SSCU on the third floor of the hospital. You will need to lie still (or keep your arm still) for the next 4 to 6 hours to minimize bleeding from the puncture site. Your family may remain in the room with you during this time.       You may be able to be discharged home that same afternoon if there is someone to drive you home and there were no complications. If you have one of the balloon, stent, or device procedures you may spend the night in the hospital. Your doctor will determine, based on your progress, the date and time of your discharge. The results of your procedure will be discussed with you before you are discharged. Any further testing or procedures will be scheduled for you either before you leave or you will be called with these appointments.       If you should have any questions, concerns, or need to change the date of your procedure, please call JOEL Waldrop @ [phone]    Special  Instructions:  Hold Eliquis 3 days before        THE ABOVE INSTRUCTIONS WERE GIVEN TO THE PATIENT VERBALLY AND THEY VERBALIZED UNDERSTANDING.  THEY DO NOT REQUIRE ANY SPECIAL NEEDS AND DO NOT HAVE ANY LEARNING BARRIERS.          Directions for Reporting to Cardiology Waiting Area in the Hospital  If you park in the Parking Garage:  Take elevators to the1st floor of the parking garage.  Continue past the gift shop, coffee shop, and piano.  Take a right and go to the gold elevators. (Elevator B)  Take the elevator to the 3rd floor.  Follow the arrow on the sign on the wall that says Cath Lab Registration/EP Lab Registration.  Follow the long hallway all the way around until you come to a big open area.  This is the registration area.  Check in at Reception Desk.    OR    If family is dropping you off:  Have them drop you off at the front of the Hospital under the green overhang.  Enter through the doors and take a right.  Take the E elevators to the 3rd floor Cardiology Waiting Area.  Check in at the Reception Desk in the waiting room.

## 2019-01-28 NOTE — LETTER
January 28, 2019      Amaya Patton MD  [address]  [facility] [address]           [facility]  [address]  [facility] [address]  Phone: [phone]          Patient: Socorro Amaro   MR Number: [id number]   YOB: 1955   Date of Visit: 1/28/2019       Dear Dr. Amaya Patton:    Thank you for referring Socorro Amaro to me for evaluation. Attached you will find relevant portions of my assessment and plan of care.    If you have questions, please do not hesitate to call me. I look forward to following Socorro Amaro along with you.    Sincerely,    Hernandez Mir MD    Enclosure  CC:  No Recipients    If you would like to receive this communication electronically, please contact [email or web address] or [phone] to request more information on "[employer] Link access.    For providers and/or their staff who would like to refer a patient to [facility], please contact us through our one-stop-shop provider referral line, [facility] , at [phone].    If you feel you have received this communication in error or would no longer like to receive these types of communications, please e-mail [email or web address]

## 2019-02-04 ENCOUNTER — LAB VISIT (OUTPATIENT)
Dept: LAB | Facility: HOSPITAL | Age: 64
End: 2019-02-04
Attending: INTERNAL MEDICINE
Payer: COMMERCIAL

## 2019-02-04 DIAGNOSIS — I10 ESSENTIAL HYPERTENSION: ICD-10-CM

## 2019-02-04 DIAGNOSIS — E03.4 HYPOTHYROIDISM DUE TO ACQUIRED ATROPHY OF THYROID: ICD-10-CM

## 2019-02-04 LAB
ALBUMIN SERPL BCP-MCNC: 3.8 G/DL
ALP SERPL-CCNC: 72 U/L
ALT SERPL W/O P-5'-P-CCNC: 29 U/L
ANION GAP SERPL CALC-SCNC: 5 MMOL/L
AST SERPL-CCNC: 24 U/L
BILIRUB SERPL-MCNC: 0.7 MG/DL
BUN SERPL-MCNC: 14 MG/DL
CALCIUM SERPL-MCNC: 9.9 MG/DL
CHLORIDE SERPL-SCNC: 106 MMOL/L
CHOLEST SERPL-MCNC: 139 MG/DL
CHOLEST/HDLC SERPL: 1.9 {RATIO}
CO2 SERPL-SCNC: 29 MMOL/L
CREAT SERPL-MCNC: 0.8 MG/DL
EST. GFR  (AFRICAN AMERICAN): >60 ML/MIN/1.73 M^2
EST. GFR  (NON AFRICAN AMERICAN): >60 ML/MIN/1.73 M^2
GLUCOSE SERPL-MCNC: 84 MG/DL
HDLC SERPL-MCNC: 74 MG/DL
HDLC SERPL: 53.2 %
LDLC SERPL CALC-MCNC: 57.6 MG/DL
NONHDLC SERPL-MCNC: 65 MG/DL
POTASSIUM SERPL-SCNC: 4.8 MMOL/L
PROT SERPL-MCNC: 6.6 G/DL
SODIUM SERPL-SCNC: 140 MMOL/L
TRIGL SERPL-MCNC: 37 MG/DL

## 2019-02-04 PROCEDURE — 80053 COMPREHEN METABOLIC PANEL: CPT

## 2019-02-04 PROCEDURE — 80061 LIPID PANEL: CPT

## 2019-02-04 PROCEDURE — 36415 COLL VENOUS BLD VENIPUNCTURE: CPT | Mod: PO

## 2019-02-06 PROBLEM — Q21.12 PFO (PATENT FORAMEN OVALE): Status: ACTIVE | Noted: 2019-02-06

## 2019-02-17 ENCOUNTER — PATIENT MESSAGE (OUTPATIENT)
Dept: MEDSURG UNIT | Facility: HOSPITAL | Age: 64
End: 2019-02-17

## 2019-02-20 ENCOUNTER — PATIENT MESSAGE (OUTPATIENT)
Dept: MEDSURG UNIT | Facility: HOSPITAL | Age: 64
End: 2019-02-20

## 2019-02-27 ENCOUNTER — PATIENT MESSAGE (OUTPATIENT)
Dept: ADMINISTRATIVE | Facility: OTHER | Age: 64
End: 2019-02-27

## 2019-02-27 ENCOUNTER — PATIENT MESSAGE (OUTPATIENT)
Dept: MEDSURG UNIT | Facility: HOSPITAL | Age: 64
End: 2019-02-27

## 2019-03-07 ENCOUNTER — HOSPITAL ENCOUNTER (OUTPATIENT)
Facility: HOSPITAL | Age: 64
Discharge: HOME | End: 2019-03-07
Attending: INTERNAL MEDICINE | Admitting: INTERNAL MEDICINE
Payer: COMMERCIAL

## 2019-03-07 VITALS
OXYGEN SATURATION: 98 % | BODY MASS INDEX: 27.97 KG/M2 | HEART RATE: 63 BPM | RESPIRATION RATE: 20 BRPM | WEIGHT: 174 LBS | SYSTOLIC BLOOD PRESSURE: 156 MMHG | HEIGHT: 66 IN | DIASTOLIC BLOOD PRESSURE: 74 MMHG | TEMPERATURE: 97 F

## 2019-03-07 DIAGNOSIS — Q21.10 ASD (ATRIAL SEPTAL DEFECT): ICD-10-CM

## 2019-03-07 LAB
ABO + RH BLD: NORMAL
ANION GAP SERPL CALC-SCNC: 6 MMOL/L
APTT BLDCRRT: 28.4 SEC
BLD GP AB SCN CELLS X3 SERPL QL: NORMAL
BUN SERPL-MCNC: 14 MG/DL
CALCIUM SERPL-MCNC: 9.6 MG/DL
CHLORIDE SERPL-SCNC: 109 MMOL/L
CO2 SERPL-SCNC: 25 MMOL/L
CREAT SERPL-MCNC: 0.8 MG/DL
ERYTHROCYTE [DISTWIDTH] IN BLOOD BY AUTOMATED COUNT: 12.8 %
EST. GFR  (AFRICAN AMERICAN): >60 ML/MIN/1.73 M^2
EST. GFR  (NON AFRICAN AMERICAN): >60 ML/MIN/1.73 M^2
GLUCOSE SERPL-MCNC: 86 MG/DL
HCT VFR BLD AUTO: 40.8 %
HGB BLD-MCNC: 13.3 G/DL
INR PPP: 1.1
MCH RBC QN AUTO: 29.1 PG
MCHC RBC AUTO-ENTMCNC: 32.6 G/DL
MCV RBC AUTO: 89 FL
PLATELET # BLD AUTO: 168 K/UL
PMV BLD AUTO: 10.9 FL
POTASSIUM SERPL-SCNC: 4.2 MMOL/L
PROTHROMBIN TIME: 11.2 SEC
RBC # BLD AUTO: 4.57 M/UL
SODIUM SERPL-SCNC: 140 MMOL/L
WBC # BLD AUTO: 3.36 K/UL

## 2019-03-07 PROCEDURE — 25000003 PHARM REV CODE 250: Performed by: INTERNAL MEDICINE

## 2019-03-07 PROCEDURE — 93662 INTRACARDIAC ECG (ICE): CPT | Performed by: INTERNAL MEDICINE

## 2019-03-07 PROCEDURE — S5010 5% DEXTROSE AND 0.45% SALINE: HCPCS | Performed by: INTERNAL MEDICINE

## 2019-03-07 PROCEDURE — 99152 MOD SED SAME PHYS/QHP 5/>YRS: CPT | Mod: ,,, | Performed by: INTERNAL MEDICINE

## 2019-03-07 PROCEDURE — C1769 GUIDE WIRE: HCPCS | Performed by: INTERNAL MEDICINE

## 2019-03-07 PROCEDURE — 93580 TRANSCATH CLOSURE OF ASD: CPT | Performed by: INTERNAL MEDICINE

## 2019-03-07 PROCEDURE — 25500020 PHARM REV CODE 255: Performed by: INTERNAL MEDICINE

## 2019-03-07 PROCEDURE — 93662 INTRACARDIAC ECG (ICE): CPT | Mod: 26,,, | Performed by: INTERNAL MEDICINE

## 2019-03-07 PROCEDURE — 86901 BLOOD TYPING SEROLOGIC RH(D): CPT

## 2019-03-07 PROCEDURE — 85027 COMPLETE CBC AUTOMATED: CPT

## 2019-03-07 PROCEDURE — 63600175 PHARM REV CODE 636 W HCPCS: Performed by: INTERNAL MEDICINE

## 2019-03-07 PROCEDURE — 99153 MOD SED SAME PHYS/QHP EA: CPT | Performed by: INTERNAL MEDICINE

## 2019-03-07 PROCEDURE — 85610 PROTHROMBIN TIME: CPT

## 2019-03-07 PROCEDURE — 93580 TRANSCATH CLOSURE OF ASD: CPT | Mod: ,,, | Performed by: INTERNAL MEDICINE

## 2019-03-07 PROCEDURE — 85730 THROMBOPLASTIN TIME PARTIAL: CPT

## 2019-03-07 PROCEDURE — 93662 PR INTRACARD ECHO, THER/DX INTERVENT: ICD-10-PCS | Mod: 26,,, | Performed by: INTERNAL MEDICINE

## 2019-03-07 PROCEDURE — C1753 CATH, INTRAVAS ULTRASOUND: HCPCS | Performed by: INTERNAL MEDICINE

## 2019-03-07 PROCEDURE — 80048 BASIC METABOLIC PNL TOTAL CA: CPT

## 2019-03-07 PROCEDURE — 99152 MOD SED SAME PHYS/QHP 5/>YRS: CPT | Performed by: INTERNAL MEDICINE

## 2019-03-07 PROCEDURE — C1894 INTRO/SHEATH, NON-LASER: HCPCS | Performed by: INTERNAL MEDICINE

## 2019-03-07 PROCEDURE — C1817 SEPTAL DEFECT IMP SYS: HCPCS | Performed by: INTERNAL MEDICINE

## 2019-03-07 PROCEDURE — 27201423 OPTIME MED/SURG SUP & DEVICES STERILE SUPPLY: Performed by: INTERNAL MEDICINE

## 2019-03-07 PROCEDURE — 99152 PR MOD CONSCIOUS SEDATION, SAME PHYS, 5+ YRS, FIRST 15 MIN: ICD-10-PCS | Mod: ,,, | Performed by: INTERNAL MEDICINE

## 2019-03-07 PROCEDURE — 93010 EKG 12-LEAD: ICD-10-PCS | Mod: ,,, | Performed by: INTERNAL MEDICINE

## 2019-03-07 PROCEDURE — 93005 ELECTROCARDIOGRAM TRACING: CPT

## 2019-03-07 PROCEDURE — 93010 ELECTROCARDIOGRAM REPORT: CPT | Mod: ,,, | Performed by: INTERNAL MEDICINE

## 2019-03-07 PROCEDURE — 93580 PR PERC CLOS,CONG INTERATRIAL COMMUN W/IMPL: ICD-10-PCS | Mod: ,,, | Performed by: INTERNAL MEDICINE

## 2019-03-07 DEVICE — CARDIOFORM SEPTAL OCCLUDER 25MM 10FR
Type: IMPLANTABLE DEVICE | Site: HEART | Status: FUNCTIONAL
Brand: GORE CARDIOFORM SEPTAL OCCLUDER

## 2019-03-07 RX ORDER — CEFAZOLIN SODIUM 1 G/3ML
1 INJECTION, POWDER, FOR SOLUTION INTRAMUSCULAR; INTRAVENOUS EVERY 4 HOURS
Status: COMPLETED | OUTPATIENT
Start: 2019-03-07 | End: 2019-03-07

## 2019-03-07 RX ORDER — CLOPIDOGREL BISULFATE 75 MG/1
75 TABLET ORAL DAILY
Qty: 30 TABLET | Refills: 11 | Status: SHIPPED | OUTPATIENT
Start: 2019-03-07 | End: 2019-07-03

## 2019-03-07 RX ORDER — ACETAMINOPHEN 325 MG/1
650 TABLET ORAL EVERY 4 HOURS PRN
Status: DISCONTINUED | OUTPATIENT
Start: 2019-03-07 | End: 2019-03-07 | Stop reason: HOSPADM

## 2019-03-07 RX ORDER — FENTANYL CITRATE 50 UG/ML
INJECTION, SOLUTION INTRAMUSCULAR; INTRAVENOUS
Status: DISCONTINUED | OUTPATIENT
Start: 2019-03-07 | End: 2019-03-07 | Stop reason: HOSPADM

## 2019-03-07 RX ORDER — DEXTROSE MONOHYDRATE AND SODIUM CHLORIDE 5; .45 G/100ML; G/100ML
INJECTION, SOLUTION INTRAVENOUS CONTINUOUS
Status: DISCONTINUED | OUTPATIENT
Start: 2019-03-07 | End: 2019-03-07 | Stop reason: HOSPADM

## 2019-03-07 RX ORDER — DIPHENHYDRAMINE HCL 25 MG
50 CAPSULE ORAL ONCE
Status: COMPLETED | OUTPATIENT
Start: 2019-03-07 | End: 2019-03-07

## 2019-03-07 RX ORDER — LIDOCAINE HYDROCHLORIDE 20 MG/ML
INJECTION, SOLUTION EPIDURAL; INFILTRATION; INTRACAUDAL; PERINEURAL
Status: DISCONTINUED | OUTPATIENT
Start: 2019-03-07 | End: 2019-03-07 | Stop reason: HOSPADM

## 2019-03-07 RX ORDER — CLOPIDOGREL 300 MG/1
300 TABLET, FILM COATED ORAL ONCE
Status: COMPLETED | OUTPATIENT
Start: 2019-03-07 | End: 2019-03-07

## 2019-03-07 RX ORDER — HEPARIN SODIUM 200 [USP'U]/100ML
INJECTION, SOLUTION INTRAVENOUS
Status: DISCONTINUED | OUTPATIENT
Start: 2019-03-07 | End: 2019-03-07 | Stop reason: HOSPADM

## 2019-03-07 RX ORDER — CEFAZOLIN SODIUM 1 G/3ML
INJECTION, POWDER, FOR SOLUTION INTRAMUSCULAR; INTRAVENOUS
Status: DISCONTINUED | OUTPATIENT
Start: 2019-03-07 | End: 2019-03-07 | Stop reason: HOSPADM

## 2019-03-07 RX ORDER — HEPARIN SODIUM 1000 [USP'U]/ML
INJECTION, SOLUTION INTRAVENOUS; SUBCUTANEOUS
Status: DISCONTINUED | OUTPATIENT
Start: 2019-03-07 | End: 2019-03-07 | Stop reason: HOSPADM

## 2019-03-07 RX ORDER — ONDANSETRON 8 MG/1
8 TABLET, ORALLY DISINTEGRATING ORAL EVERY 8 HOURS PRN
Status: DISCONTINUED | OUTPATIENT
Start: 2019-03-07 | End: 2019-03-07 | Stop reason: HOSPADM

## 2019-03-07 RX ORDER — MIDAZOLAM HYDROCHLORIDE 1 MG/ML
INJECTION, SOLUTION INTRAMUSCULAR; INTRAVENOUS
Status: DISCONTINUED | OUTPATIENT
Start: 2019-03-07 | End: 2019-03-07 | Stop reason: HOSPADM

## 2019-03-07 RX ADMIN — DIPHENHYDRAMINE HYDROCHLORIDE 50 MG: 25 CAPSULE ORAL at 08:03

## 2019-03-07 RX ADMIN — CEFAZOLIN 1 G: 330 INJECTION, POWDER, FOR SOLUTION INTRAMUSCULAR; INTRAVENOUS at 06:03

## 2019-03-07 RX ADMIN — CLOPIDOGREL BISULFATE 300 MG: 300 TABLET, FILM COATED ORAL at 10:03

## 2019-03-07 RX ADMIN — CEFAZOLIN 1 G: 330 INJECTION, POWDER, FOR SOLUTION INTRAMUSCULAR; INTRAVENOUS at 02:03

## 2019-03-07 RX ADMIN — ACETAMINOPHEN 650 MG: 325 TABLET ORAL at 03:03

## 2019-03-07 RX ADMIN — DEXTROSE AND SODIUM CHLORIDE: 5; .45 INJECTION, SOLUTION INTRAVENOUS at 08:03

## 2019-03-07 NOTE — PLAN OF CARE
Problem: Adult Inpatient Plan of Care  Goal: Plan of Care Review  Outcome: Ongoing (interventions implemented as appropriate)  Received report from JOEL Duncan. Patient s/p ASD closure, AAOx3. VSS, no c/o pain or discomfort at this time, resp even and unlabored. Gauze/tegaderm dressing to R groin is CDI. No active bleeding. No hematoma noted. Post procedure protocol reviewed with patient and patient's family. Understanding verbalized. Family members at bedside. Nurse call bell within reach. Will continue to monitor per post procedure protocol.

## 2019-03-07 NOTE — DISCHARGE SUMMARY
[facility]  Cardiology  Discharge Summary      Patient Name: Socorro Amaro  MRN: [id number]  Admission Date: 3/7/2019  Hospital Length of Stay: 0 days  Discharge Date and Time:  03/07/2019 10:42 AM  Attending Physician: Hernandez Mir MD  Discharging Provider: Jatin Ruth MD  Primary Care Physician: Joy Rice MD    HPI: 62 yo o F with PMHx of thromboembolic event and found to have a small secundum ASD with Left to Right shunt on MARINE and a small PFO with Right to Left shunt on TTE with bubble study here for ASD closure evaluation. She was seen in Oct, 2018 for LAAOD implantation but deemed to have no indication for it however Echo was concerning for ASD/PFO and was confirmed on MARINE dated 12/14/19.     She has PMHx of essential HTN, dyslipidemia, CAD s/p STEMI 07/12/18 due to embolic occlusion of LAD s/p thrombectomy without stenting on Eliquis though no objective evidence of pAF noted.       Procedure(s) (LRB):  Closure, PFO  (N/A)     Indwelling Lines/Drains at time of discharge:  Lines/Drains/Airways          None          Hospital Course   ASD w/PFO, no ASA   Embolic MI (stemi)  No hx of AFib  9mm PFO measured by sizing balloon  Successful closure of 9mm PFO with short tunnel with 25mm CARDIOFORM Device, - bubble study post.    Recommendations:   - Routine post-cath care  - 2gm ancef iv q4 x 3 doses total  - Continue medical management  - dc eliquis  - plavix 300mg po x 1 today  - asa/plavix x 6 months  - follow up with Dr Mir in 6 months with TTE/bubble study pre-visit    Consults: none    Significant Diagnostic Studies: Labs: All labs within the past 24 hours have been reviewed  Angiography: as above    Pending Diagnostic Studies:     None          Final Active Diagnoses:    Diagnosis Date Noted POA    ASD (atrial septal defect) [Q21.1] 11/20/2018 Not Applicable      Problems Resolved During this Admission:       Discharged Condition: stable    Follow Up:  Follow-up  Information     Hernandez Mir MD. Schedule an appointment as soon as possible for a visit in 6 months.    Specialties:  Cardiology, INTERVENTIONAL CARDIOLOGY  Contact information:  [address]  [facility] [address]  [phone]                 Patient Instructions:      Transthoracic echo (TTE) complete (Cupid Only)   Standing Status: Future Standing Exp. Date: 20   Order Comments: With bubble study to assess ASD/PFO closure device.  Thank you     Medications:  Reconciled Home Medications:      Medication List      START taking these medications    clopidogrel 75 mg tablet  Commonly known as:  PLAVIX  Take 1 tablet (75 mg total) by mouth once daily.        CHANGE how you take these medications    olopatadine 0.1 % ophthalmic solution  Commonly known as:  PATANOL  Place 1 drop into both eyes 2 (two) times daily.  What changed:    · when to take this  · reasons to take this        CONTINUE taking these medications    5- mg Cap  Generic dru-hydroxytryptophan (5-HTP)  Take by mouth every evening.     ALPRAZolam 0.25 MG tablet  Commonly known as:  XANAX  TAKE 1 TABLET BY MOUTH 1 hour before flight     ALREX 0.2 % Drps  Generic drug:  loteprednol  Alrex 0.2 % eye drops,suspension     aspirin 81 MG EC tablet  Commonly known as:  ECOTRIN  Take 81 mg by mouth once daily.     co-enzyme Q-10 30 mg capsule  Take 300 mg by mouth once daily.     cyanocobalamin 1000 MCG tablet  Commonly known as:  VITAMIN B-12  Take 5,000 mcg by mouth once daily.     DHEA ORAL  Take by mouth once daily. 50 mg     FLUZONE QUAD 9810-7379 (PF) 60 mcg (15 mcg x 4)/0.5 mL Syrg  Generic drug:  flu vac [id number] 36mos up(PF)  Fluzone Quad (PF) 60 mcg(15 mcgx4)/0.5 mL intramuscular syringe     levothyroxine 75 MCG tablet  Commonly known as:  SYNTHROID  Brand only TAKE ONE TABLET BY MOUTH ONE TIME DAILY BEFORE BRRAKFAST     loratadine 10 mg tablet  Commonly known as:  CLARITIN  Allerclear 10 mg tablet   Take 1 tablet as  needed by oral route.     losartan 50 MG tablet  Commonly known as:  COZAAR  Take 50 mg by mouth once daily.     FAUSTO (BULK) MISC  fausto (bulk)     metoprolol tartrate 25 MG tablet  Commonly known as:  LOPRESSOR  Take 1 tablet (25 mg total) by mouth 2 (two) times daily.     multivitamin capsule  Take 1 capsule by mouth once daily.     omeprazole 40 MG capsule  Commonly known as:  PRILOSEC  omeprazole 40 mg capsule,delayed release   Take 1 capsule every day by oral route.     PHOSPHATIDYL SERINE (BULK) MISC  1 capsule by Misc.(Non-Drug; Combo Route) route once daily. 150 mg per capsule     ranitidine 300 MG tablet  Commonly known as:  ZANTAC  ranitidine 300 mg tablet as needed     rosuvastatin 5 MG tablet  Commonly known as:  CRESTOR  Take 1 tablet (5 mg total) by mouth once daily.     SHINGRIX (PF) 50 mcg/0.5 mL injection  Generic drug:  varicella-zoster gE-AS01B (PF)  TO BE ADMINISTERED BY PHARMACIST     VITAMIN D3 1,000 unit capsule  Generic drug:  cholecalciferol (vitamin D3)  Vitamin D3        STOP taking these medications    apixaban 5 mg Tab  Commonly known as:  ELIQUIS            Time spent on the discharge of patient: 35 minutes    Jatin Ruth MD  Cardiology  [facility]

## 2019-03-07 NOTE — PROCEDURES
"    Post Cath Note  Referring Physician: Hernandez Mir MD  Procedure: Closure, PFO  (N/A)       Access: 11Fr and 9Fr in RCFV     ASD w/PFO, no ASA   Embolic MI (stemi)  No hx of AFib  9mm PFO measured by sizing balloon    See full report for further details    Intervention:   Successful closure of 9mm PFO with short tunnel with 25mm CARDIOFORM Device, - bubble study post.    Closure device: Manual pressure    Post Cath Exam:   BP (!) 141/78 (BP Location: Left arm, Patient Position: Lying)   Pulse (!) 58   Temp 96.5 °F (35.8 °C) (Oral)   Resp 18   Ht 5' 6" (1.676 m)   Wt 78.9 kg (174 lb)   SpO2 98%   Breastfeeding? No   BMI 28.08 kg/m²   No unusual pain, hematoma, thrill or bruit at vascular access site.  Distal pulse present without signs of ischemia.    Recommendations:   - Routine post-cath care  - Continue medical management   - ancef 2gm iv q4 x 3 doses total to end at 6pm after which patient can be discharged if she is without incident  - dc eliquis  - plavix 300mg po x 1 today  - asa/plavix x 6 months  - follow up with Dr Mir in 6 months with TTE/bubble study pre-visit    Signed:  Jatin Ruth MD  PGY-V Cardiology Fellow  [phone]          "

## 2019-03-07 NOTE — H&P
Subjective:    Patient ID:  Socorro Amaro is a 63 y.o. lady who presents for evaluation of closure of small secundum ASD with L-R shunt as well as a PFO with R-L shunt.        Referring Physician: Joy Rice MD/ Dr. Chandu Morales  Electrophysiologist: Lonnie Hernandez MD     HPI   64 yo o F with PMHx of thromboembolic event and found to have a small secundum ASD with Left to Right shunt on MARINE and a small PFO with Right to Left shunt on TTE with bubble study here for ASD closure evaluation. She was seen in Oct, 2018 for LAAOD implantation but deemed to have no indication for it however Echo was concerning for ASD/PFO and was confirmed on MARINE dated 12/14/19.     She has PMHx of essential HTN, dyslipidemia, CAD s/p STEMI 07/12/18 due to embolic occlusion of LAD s/p thrombectomy without stenting and pAF on Eliquis though no objective evidence of pAF noted. She was diagnosed with pAF in 2016 but started anticoagulation after STEMIn in 6/30/2018.      Her CHADsVASc score is 4           Past Medical History:   Diagnosis Date    HTN (hypertension)      STEMI (ST elevation myocardial infarction) 06/30/2018     embolic event    Thyroid disease               Current Outpatient Medications on File Prior to Visit   Medication Sig Dispense Refill    apixaban (ELIQUIS) 5 mg Tab Take 1 tablet (5 mg total) by mouth 2 (two) times daily. 60 tablet 11    aspirin (ECOTRIN) 81 MG EC tablet Take 81 mg by mouth once daily.        co-enzyme Q-10 30 mg capsule Take 300 mg by mouth once daily.        cyanocobalamin (VITAMIN B-12) 1000 MCG tablet Take 5,000 mcg by mouth once daily.        levothyroxine (SYNTHROID) 75 MCG tablet Brand only TAKE ONE TABLET BY MOUTH ONE TIME DAILY BEFORE BRRAKFAST 90 tablet 3    loratadine (CLARITIN) 10 mg tablet Allerclear 10 mg tablet   Take 1 tablet as needed by oral route.        losartan (COZAAR) 50 MG tablet Take 50 mg by mouth once daily.   2    metoprolol tartrate (LOPRESSOR) 25 MG  "tablet Take 1 tablet (25 mg total) by mouth 2 (two) times daily. 180 tablet 3    multivitamin capsule Take 1 capsule by mouth once daily.        omeprazole (PRILOSEC) 40 MG capsule omeprazole 40 mg capsule,delayed release   Take 1 capsule every day by oral route.        PHOSPHATIDYL SERINE, BULK, MISC 1 capsule by Misc.(Non-Drug; Combo Route) route once daily. 150 mg per capsule        PRASTERONE, DHEA, (DHEA ORAL) Take by mouth once daily. 50 mg        ranitidine (ZANTAC) 300 MG tablet ranitidine 300 mg tablet as needed 90 tablet 3    rosuvastatin (CRESTOR) 5 MG tablet Take 1 tablet (5 mg total) by mouth once daily. 90 tablet 3    5-hydroxytryptophan (5-HTP) 100 mg Cap Take by mouth every evening.         ALPRAZolam (XANAX) 0.25 MG tablet TAKE 1 TABLET BY MOUTH 1 hour before flight   0    cholecalciferol, vitamin D3, (VITAMIN D3) 1,000 unit capsule Vitamin D3        flu vac [id number] 36mos up,PF, (FLUZONE QUAD 6582-6434, PF,) 60 mcg (15 mcg x 4)/0.5 mL Syrg Fluzone Quad 2018-19(PF) 60 mcg(15 mcgx4)/0.5 mL intramuscular syringe        lorazepam (ATIVAN) 1 MG tablet as needed.         loteprednol (ALREX) 0.2 % DrpS Alrex 0.2 % eye drops,suspension        FAUSTO, BULK, MISC fausto (bulk)        olopatadine (PATANOL) 0.1 % ophthalmic solution Place 1 drop into both eyes 2 (two) times daily. (Patient taking differently: Place 1 drop into both eyes daily as needed. ) 5 mL 6    SHINGRIX, PF, 50 mcg/0.5 mL injection TO BE ADMINISTERED BY PHARMACIST   0      No current facility-administered medications on file prior to visit.       Vitals        Vitals:     01/28/19 1138 01/28/19 1140   BP: 133/68 135/67   BP Location: Left arm Right arm   Patient Position: Sitting Sitting   BP Method: Large (Automatic) Large (Automatic)   Pulse: (!) 58 63   SpO2: 98%     Weight: 79.8 kg (175 lb 14.8 oz)     Height: 5' 6" (1.676 m)           Body mass index is 28.4 kg/m².              Review of Systems   Constitution: Negative " for chills, decreased appetite, fever, weakness, night sweats, weight gain and weight loss.   HENT: Negative for congestion, hoarse voice, stridor and tinnitus.    Eyes: Negative for blurred vision, pain and visual disturbance.   Cardiovascular: Negative for chest pain, claudication, dyspnea on exertion, irregular heartbeat, leg swelling, near-syncope, orthopnea, palpitations, paroxysmal nocturnal dyspnea and syncope.   Respiratory: Negative for cough, hemoptysis, shortness of breath, snoring and wheezing.    Endocrine: Negative for cold intolerance, heat intolerance and polyuria.   Hematologic/Lymphatic: Positive for bleeding problem. Bruises/bleeds easily.   Skin: Negative for color change and rash.   Musculoskeletal: Negative for arthritis, back pain, joint pain, muscle cramps, myalgias and stiffness.   Gastrointestinal: Negative for abdominal pain, anorexia, constipation, diarrhea, dysphagia, heartburn, hemorrhoids, melena, nausea and vomiting.   Genitourinary: Negative for frequency, hematuria, hesitancy, nocturia and urgency.   Neurological: Negative for difficulty with concentration, dizziness, focal weakness, headaches, light-headedness, numbness, seizures, tremors and vertigo.   Psychiatric/Behavioral: Negative for altered mental status and depression. The patient does not have insomnia.    Allergic/Immunologic: Negative for hives.         Objective:    Physical Exam   Constitutional: She appears well-developed and well-nourished.   HENT:   Head: Normocephalic and atraumatic.   Right Ear: External ear normal.   Left Ear: External ear normal.   Eyes: Conjunctivae and EOM are normal. Pupils are equal, round, and reactive to light.   Neck: Normal range of motion. Neck supple. No JVD present. No thyromegaly present.   Cardiovascular: Normal rate, regular rhythm, S1 normal, S2 normal, normal heart sounds and intact distal pulses. Exam reveals no gallop, no S3 and no friction rub.   No murmur heard.  Pulses:        Radial pulses are 2+ on the right side, and 2+ on the left side.        Femoral pulses are 2+ on the right side, and 2+ on the left side.       Dorsalis pedis pulses are 2+ on the right side, and 2+ on the left side.        Posterior tibial pulses are 2+ on the right side, and 2+ on the left side.   Pulmonary/Chest: Effort normal. No stridor. She has no wheezes. She has no rales. She exhibits no tenderness.   Abdominal: Soft. Bowel sounds are normal. She exhibits no distension. There is no tenderness. There is no rebound and no guarding.   Musculoskeletal: Normal range of motion. She exhibits no edema or tenderness.   Psychiatric: She has a normal mood and affect.      Assessment:       1. PFO with Right to left shunt on TTE with bubble study and a thromboembolic event in recent past on anticoagulation - Will benefit from PFO closure       2. Seccundum ASD with Left to Right shunt on MARINE.   3. Essential hypertension    4. S/P laparoscopic BSO (bilateral salpingo-oophorectomy)    5. Hypothyroidism due to acquired atrophy of thyroid    6.      Chronic anticoagulation.   Plan:      Will proceed with PFO closure with ICE. Will see if will be able to close both ASD/PFO with single device.       Right CFV 7F and 9F access. Small sizing balloon.  Will switch to DaPT for 3-6 months after device implantation.  Hold Eliquis 3 days pre procedure.      - Pre-cath IVF ordered  -The risks, benefits and alternatives of the procedure were explained to the patient.   -The risks of closure device implantation include but are not limited to: bleeding, infection, heart rhythm abnormalities, allergic reactions, kidney injury, stroke and death.   -The risks of moderate sedation include hypotension, respiratory depression, arrhythmias, bronchospasm, and death.   - Informed consent was obtained and the patient is agreeable to proceed with the procedure.          Dany Burgess MD  PGY-[id number]  Interventional Cardiology/Structural Fellow       Staff:  I have personally taken the history and examined this patient and agree with the fellow's note as stated above and amended it accordingly :-) This patient has no evidence of afib and her embolic MI was likely related to her PFO and ASD.  Will Close both if found percutaneously with ICE and flouroscopy.

## 2019-03-07 NOTE — PLAN OF CARE
Problem: Adult Inpatient Plan of Care  Goal: Plan of Care Review  Outcome: Ongoing (interventions implemented as appropriate)  Pt arrived to unit accompanied by spouse.  Vss.  Pt aao, remains npo.  Pre op orders and assessment initiated.  Pt in no acute distress and verbalizes no complaints.  Pt oriented to rm and unit.  Will continue to monitor.  Call bell within reach.

## 2019-04-14 ENCOUNTER — PATIENT MESSAGE (OUTPATIENT)
Dept: CARDIOLOGY | Facility: CLINIC | Age: 64
End: 2019-04-14

## 2019-04-15 DIAGNOSIS — Q21.10 ASD (ATRIAL SEPTAL DEFECT): Primary | ICD-10-CM

## 2019-06-24 ENCOUNTER — OFFICE VISIT (OUTPATIENT)
Dept: CARDIOLOGY | Facility: CLINIC | Age: 64
End: 2019-06-24
Payer: COMMERCIAL

## 2019-06-24 ENCOUNTER — HOSPITAL ENCOUNTER (OUTPATIENT)
Dept: CARDIOLOGY | Facility: CLINIC | Age: 64
Discharge: HOME | End: 2019-06-24
Attending: INTERNAL MEDICINE
Payer: COMMERCIAL

## 2019-06-24 VITALS
SYSTOLIC BLOOD PRESSURE: 162 MMHG | HEART RATE: 86 BPM | WEIGHT: 174 LBS | HEIGHT: 66 IN | DIASTOLIC BLOOD PRESSURE: 90 MMHG | BODY MASS INDEX: 27.97 KG/M2

## 2019-06-24 VITALS
OXYGEN SATURATION: 99 % | SYSTOLIC BLOOD PRESSURE: 162 MMHG | DIASTOLIC BLOOD PRESSURE: 80 MMHG | HEART RATE: 63 BPM | WEIGHT: 179.88 LBS | BODY MASS INDEX: 28.23 KG/M2 | HEIGHT: 67 IN

## 2019-06-24 DIAGNOSIS — I21.02 ST ELEVATION MYOCARDIAL INFARCTION INVOLVING LEFT ANTERIOR DESCENDING (LAD) CORONARY ARTERY: ICD-10-CM

## 2019-06-24 DIAGNOSIS — E03.4 HYPOTHYROIDISM DUE TO ACQUIRED ATROPHY OF THYROID: ICD-10-CM

## 2019-06-24 DIAGNOSIS — Q21.12 PFO (PATENT FORAMEN OVALE): Primary | ICD-10-CM

## 2019-06-24 DIAGNOSIS — Z90.722 S/P BSO (BILATERAL SALPINGO-OOPHORECTOMY): ICD-10-CM

## 2019-06-24 DIAGNOSIS — I25.118 CORONARY ARTERY DISEASE OF NATIVE ARTERY OF NATIVE HEART WITH STABLE ANGINA PECTORIS: ICD-10-CM

## 2019-06-24 DIAGNOSIS — Q21.10 ASD (ATRIAL SEPTAL DEFECT): ICD-10-CM

## 2019-06-24 DIAGNOSIS — I10 ESSENTIAL HYPERTENSION: ICD-10-CM

## 2019-06-24 LAB
ASCENDING AORTA: 2.8 CM
AV INDEX (PROSTH): 0.74
AV MEAN GRADIENT: 3 MMHG
AV PEAK GRADIENT: 5 MMHG
AV VALVE AREA: 2.52 CM2
AV VELOCITY RATIO: 0.8
BSA FOR ECHO PROCEDURE: 1.92 M2
CV ECHO LV RWT: 0.2 CM
DOP CALC AO PEAK VEL: 1.1 M/S
DOP CALC AO VTI: 24.8 CM
DOP CALC LVOT AREA: 3.4 CM2
DOP CALC LVOT DIAMETER: 2.08 CM
DOP CALC LVOT PEAK VEL: 0.88 M/S
DOP CALC LVOT STROKE VOLUME: 62.52 CM3
DOP CALCLVOT PEAK VEL VTI: 18.41 CM
E WAVE DECELERATION TIME: 166.24 MSEC
E/A RATIO: 1.1
ECHO LV POSTERIOR WALL: 0.47 CM (ref 0.6–1.1)
FRACTIONAL SHORTENING: 34 % (ref 28–44)
INTERVENTRICULAR SEPTUM: 0.68 CM (ref 0.6–1.1)
IVRT: 0.09 MSEC
LA MAJOR: 5.86 CM
LA MINOR: 5.72 CM
LA WIDTH: 3.97 CM
LEFT ATRIUM SIZE: 3.39 CM
LEFT ATRIUM VOLUME INDEX: 35.1 ML/M2
LEFT ATRIUM VOLUME: 66.23 CM3
LEFT INTERNAL DIMENSION IN SYSTOLE: 3.15 CM (ref 2.1–4)
LEFT VENTRICLE DIASTOLIC VOLUME INDEX: 56.5 ML/M2
LEFT VENTRICLE DIASTOLIC VOLUME: 106.52 ML
LEFT VENTRICLE MASS INDEX: 44 G/M2
LEFT VENTRICLE SYSTOLIC VOLUME INDEX: 20.9 ML/M2
LEFT VENTRICLE SYSTOLIC VOLUME: 39.38 ML
LEFT VENTRICULAR INTERNAL DIMENSION IN DIASTOLE: 4.78 CM (ref 3.5–6)
LEFT VENTRICULAR MASS: 83.23 G
MV PEAK A VEL: 0.61 M/S
MV PEAK E VEL: 0.67 M/S
PISA TR MAX VEL: 2.29 M/S
PULM VEIN S/D RATIO: 1.63
PV PEAK D VEL: 0.4 M/S
PV PEAK S VEL: 0.65 M/S
RA MAJOR: 4.52 CM
RA PRESSURE: 3 MMHG
RA WIDTH: 4.07 CM
RIGHT VENTRICULAR END-DIASTOLIC DIMENSION: 3.48 CM
RV TISSUE DOPPLER FREE WALL SYSTOLIC VELOCITY 1 (APICAL 4 CHAMBER VIEW): 11.05 CM/S
SINUS: 2.94 CM
STJ: 2.45 CM
TDI LATERAL: 0.1 M/S
TDI SEPTAL: 0.1 M/S
TR MAX PG: 21 MMHG
TRICUSPID ANNULAR PLANE SYSTOLIC EXCURSION: 1.88 CM
TV REST PULMONARY ARTERY PRESSURE: 24 MMHG

## 2019-06-24 PROCEDURE — 3008F BODY MASS INDEX DOCD: CPT | Mod: CPTII,S$GLB,, | Performed by: INTERNAL MEDICINE

## 2019-06-24 PROCEDURE — 3079F PR MOST RECENT DIASTOLIC BLOOD PRESSURE 80-89 MM HG: ICD-10-PCS | Mod: CPTII,S$GLB,, | Performed by: INTERNAL MEDICINE

## 2019-06-24 PROCEDURE — 99999 PR PBB SHADOW E&M-EST. PATIENT-LVL V: ICD-10-PCS | Mod: PBBFAC,,, | Performed by: INTERNAL MEDICINE

## 2019-06-24 PROCEDURE — 93306 TTE W/DOPPLER COMPLETE: CPT

## 2019-06-24 PROCEDURE — 3077F PR MOST RECENT SYSTOLIC BLOOD PRESSURE >= 140 MM HG: ICD-10-PCS | Mod: CPTII,S$GLB,, | Performed by: INTERNAL MEDICINE

## 2019-06-24 PROCEDURE — 93306 TRANSTHORACIC ECHO (TTE) COMPLETE (CUPID ONLY): ICD-10-PCS | Mod: 26,,, | Performed by: INTERNAL MEDICINE

## 2019-06-24 PROCEDURE — 3079F DIAST BP 80-89 MM HG: CPT | Mod: CPTII,S$GLB,, | Performed by: INTERNAL MEDICINE

## 2019-06-24 PROCEDURE — 3008F PR BODY MASS INDEX (BMI) DOCUMENTED: ICD-10-PCS | Mod: CPTII,S$GLB,, | Performed by: INTERNAL MEDICINE

## 2019-06-24 PROCEDURE — 99213 OFFICE O/P EST LOW 20 MIN: CPT | Mod: S$GLB,,, | Performed by: INTERNAL MEDICINE

## 2019-06-24 PROCEDURE — 3077F SYST BP >= 140 MM HG: CPT | Mod: CPTII,S$GLB,, | Performed by: INTERNAL MEDICINE

## 2019-06-24 PROCEDURE — 99213 PR OFFICE/OUTPT VISIT, EST, LEVL III, 20-29 MIN: ICD-10-PCS | Mod: S$GLB,,, | Performed by: INTERNAL MEDICINE

## 2019-06-24 PROCEDURE — 93306 TTE W/DOPPLER COMPLETE: CPT | Mod: 26,,, | Performed by: INTERNAL MEDICINE

## 2019-06-24 PROCEDURE — 99999 PR PBB SHADOW E&M-EST. PATIENT-LVL V: CPT | Mod: PBBFAC,,, | Performed by: INTERNAL MEDICINE

## 2019-06-24 NOTE — PROGRESS NOTES
Procedure explained. 20 g sl started in right forearm for bubble study. Bubble study done x 2 with and without valsalva . Tolerated well.  Sl d/vargas after pressure applied.

## 2019-06-24 NOTE — PROGRESS NOTES
Subjective:    Patient ID:  Socorro Amaro is a 63 y.o. female who presents for follow-up after small secundum ASD/PFO closure on 3/7/2019.    Referring Physician PCP: Joy Rice MD/ Dr. Chandu Morales  Primary Cardiologist: Juana Patton MD  Electrophysiologist: Lonnie Hernandez MD    HPI   Ms. Amaro is a 63 year old lady here follow up after ASD/PFO closure. She had a thromboembolic event resulting in STEMI and found to have a small secundum ASD with Left to Right shunt on MARINE and a small PFO with Right to Left shunt on TTE with bubble study. She underwent successful closure of both the 9mm PFO (coaxially) and possibly the small ASD with a 25mm [place] Cardioform device. TTE today shows well seated device with no residual shunt.     She has PMHx of essential HTN, dyslipidemia, CAD s/p STEMI 07/12/18 due to embolic occlusion of LAD s/p thrombectomy without stenting and pAF on Eliquis though no objective evidence of pAF noted.     Past Medical History:   Diagnosis Date    HTN (hypertension)     STEMI (ST elevation myocardial infarction) 06/30/2018    embolic event    Thyroid disease      Current Outpatient Medications on File Prior to Visit   Medication Sig Dispense Refill    5-hydroxytryptophan (5-HTP) 100 mg Cap Take by mouth every evening.       aspirin (ECOTRIN) 81 MG EC tablet Take 81 mg by mouth once daily.      cholecalciferol, vitamin D3, (VITAMIN D3) 1,000 unit capsule Vitamin D3      clopidogrel (PLAVIX) 75 mg tablet Take 1 tablet (75 mg total) by mouth once daily. 30 tablet 11    co-enzyme Q-10 30 mg capsule Take 300 mg by mouth once daily.      cyanocobalamin (VITAMIN B-12) 1000 MCG tablet Take 5,000 mcg by mouth once daily.      levothyroxine (SYNTHROID) 75 MCG tablet Brand only TAKE ONE TABLET BY MOUTH ONE TIME DAILY BEFORE BRRAKFAST 90 tablet 3    loratadine (CLARITIN) 10 mg tablet Allerclear 10 mg tablet   Take 1 tablet as needed by oral route.      losartan (COZAAR) 50 MG tablet Take  "50 mg by mouth once daily.  2    loteprednol (ALREX) 0.2 % DrpS Alrex 0.2 % eye drops,suspension      FAUSTO, BULK, MISC fausto (bulk)      metoprolol tartrate (LOPRESSOR) 25 MG tablet Take 1 tablet (25 mg total) by mouth 2 (two) times daily. 180 tablet 3    multivitamin capsule Take 1 capsule by mouth once daily.      olopatadine (PATANOL) 0.1 % ophthalmic solution Place 1 drop into both eyes 2 (two) times daily. (Patient taking differently: Place 1 drop into both eyes daily as needed. ) 5 mL 6    omeprazole (PRILOSEC) 40 MG capsule omeprazole 40 mg capsule,delayed release   Take 1 capsule every day by oral route.      PHOSPHATIDYL SERINE, BULK, MISC 1 capsule by Misc.(Non-Drug; Combo Route) route once daily. 150 mg per capsule      PRASTERONE, DHEA, (DHEA ORAL) Take by mouth once daily. 50 mg      ALPRAZolam (XANAX) 0.25 MG tablet TAKE 1 TABLET BY MOUTH 1 hour before flight  0    flu vac [id number] 36mos up,PF, (FLUZONE QUAD 6055-3285, PF,) 60 mcg (15 mcg x 4)/0.5 mL Syrg Fluzone Quad 2018-19(PF) 60 mcg(15 mcgx4)/0.5 mL intramuscular syringe      ranitidine (ZANTAC) 300 MG tablet ranitidine 300 mg tablet as needed 90 tablet 3    rosuvastatin (CRESTOR) 5 MG tablet Take 1 tablet (5 mg total) by mouth once daily. 90 tablet 3    SHINGRIX, PF, 50 mcg/0.5 mL injection TO BE ADMINISTERED BY PHARMACIST  0     No current facility-administered medications on file prior to visit.      Vitals:    06/24/19 0917 06/24/19 0920   BP: (!) 170/77 (!) 162/80   BP Location: Left arm    Patient Position: Sitting    BP Method: Large (Automatic)    Pulse: (!) 59 63   SpO2: 99%    Weight: 81.6 kg (179 lb 14.3 oz)    Height: 5' 7" (1.702 m)      Body mass index is 28.18 kg/m².          Review of Systems   Constitution: Negative for chills, decreased appetite, fever, night sweats, weight gain and weight loss.   HENT: Negative for congestion, hoarse voice, stridor and tinnitus.    Eyes: Negative for blurred vision, pain and visual " disturbance.   Cardiovascular: Negative for chest pain, claudication, dyspnea on exertion, irregular heartbeat, leg swelling, near-syncope, orthopnea, palpitations, paroxysmal nocturnal dyspnea and syncope.   Respiratory: Negative for cough, hemoptysis, shortness of breath, snoring and wheezing.    Endocrine: Negative for cold intolerance, heat intolerance and polyuria.   Hematologic/Lymphatic: Negative for bleeding problem. Does not bruise/bleed easily.   Skin: Negative for color change and rash.   Musculoskeletal: Negative for arthritis, back pain, joint pain, muscle cramps, myalgias and stiffness.   Gastrointestinal: Negative for abdominal pain, anorexia, constipation, diarrhea, dysphagia, heartburn, hemorrhoids, melena, nausea and vomiting.   Genitourinary: Negative for frequency, hematuria, hesitancy, nocturia and urgency.   Neurological: Negative for difficulty with concentration, dizziness, focal weakness, headaches, light-headedness, numbness, seizures, tremors, vertigo and weakness.   Psychiatric/Behavioral: Negative for altered mental status and depression. The patient does not have insomnia.    Allergic/Immunologic: Negative for hives.        Objective:    Physical Exam   Constitutional: She appears well-developed and well-nourished.   HENT:   Head: Normocephalic and atraumatic.   Right Ear: External ear normal.   Left Ear: External ear normal.   Eyes: Pupils are equal, round, and reactive to light. Conjunctivae and EOM are normal.   Neck: Normal range of motion. Neck supple. No JVD present. No thyromegaly present.   Cardiovascular: Normal rate, regular rhythm, S1 normal, S2 normal, normal heart sounds and intact distal pulses. Exam reveals no gallop, no S3 and no friction rub.   No murmur heard.  Pulses:       Radial pulses are 2+ on the right side, and 2+ on the left side.        Femoral pulses are 2+ on the right side, and 2+ on the left side.       Dorsalis pedis pulses are 2+ on the right side, and  2+ on the left side.        Posterior tibial pulses are 2+ on the right side, and 2+ on the left side.   Pulmonary/Chest: Effort normal. No stridor. She has no wheezes. She has no rales. She exhibits no tenderness.   Abdominal: Soft. Bowel sounds are normal. She exhibits no distension. There is no tenderness. There is no rebound and no guarding.   Musculoskeletal: Normal range of motion. She exhibits no edema or tenderness.   Psychiatric: She has a normal mood and affect.         Assessment:       1. PFO (patent foramen ovale) s/p 25mm [place] Cardioform device - TTE on 6/24/19 showed well seated device with no residual shunt   2. H/o Embolic STEMI without PCI - on DaPT since 6/30/2018   3. Essential hypertension - uncontrolled. Advised to discuss this with PCP   4. S/P laparoscopic BSO (bilateral salpingo-oophorectomy)    5. Hypothyroidism due to acquired atrophy of thyroid    6 Anxiety - on xanax     Plan:     Stop plavix and continue ASA 81 mg po daily lifelong.   Follow up PRN    Dany Burgess MD  Interventional Cardiology/Structural Fellow    Staff:  I have personally taken the history and examined this patient and agree with the fellow's note as stated above and amended it accordingly :-)

## 2019-07-03 ENCOUNTER — OFFICE VISIT (OUTPATIENT)
Dept: CARDIOLOGY | Facility: CLINIC | Age: 64
End: 2019-07-03
Payer: COMMERCIAL

## 2019-07-03 VITALS
SYSTOLIC BLOOD PRESSURE: 130 MMHG | DIASTOLIC BLOOD PRESSURE: 64 MMHG | WEIGHT: 181.44 LBS | BODY MASS INDEX: 28.48 KG/M2 | HEART RATE: 71 BPM | HEIGHT: 67 IN

## 2019-07-03 DIAGNOSIS — Z87.74 HISTORY OF PERCUTANEOUS TRANSCATHETER CLOSURE OF CONGENITAL ASD: ICD-10-CM

## 2019-07-03 DIAGNOSIS — I10 ESSENTIAL HYPERTENSION: Primary | ICD-10-CM

## 2019-07-03 DIAGNOSIS — Q21.10 ASD (ATRIAL SEPTAL DEFECT): ICD-10-CM

## 2019-07-03 DIAGNOSIS — I21.02 ST ELEVATION MYOCARDIAL INFARCTION INVOLVING LEFT ANTERIOR DESCENDING (LAD) CORONARY ARTERY: ICD-10-CM

## 2019-07-03 PROCEDURE — 3078F PR MOST RECENT DIASTOLIC BLOOD PRESSURE < 80 MM HG: ICD-10-PCS | Mod: CPTII,S$GLB,, | Performed by: STUDENT IN AN ORGANIZED HEALTH CARE EDUCATION/TRAINING PROGRAM

## 2019-07-03 PROCEDURE — 3008F BODY MASS INDEX DOCD: CPT | Mod: CPTII,S$GLB,, | Performed by: STUDENT IN AN ORGANIZED HEALTH CARE EDUCATION/TRAINING PROGRAM

## 2019-07-03 PROCEDURE — 3075F SYST BP GE 130 - 139MM HG: CPT | Mod: CPTII,S$GLB,, | Performed by: STUDENT IN AN ORGANIZED HEALTH CARE EDUCATION/TRAINING PROGRAM

## 2019-07-03 PROCEDURE — 3008F PR BODY MASS INDEX (BMI) DOCUMENTED: ICD-10-PCS | Mod: CPTII,S$GLB,, | Performed by: STUDENT IN AN ORGANIZED HEALTH CARE EDUCATION/TRAINING PROGRAM

## 2019-07-03 PROCEDURE — 99999 PR PBB SHADOW E&M-EST. PATIENT-LVL V: ICD-10-PCS | Mod: PBBFAC,,, | Performed by: STUDENT IN AN ORGANIZED HEALTH CARE EDUCATION/TRAINING PROGRAM

## 2019-07-03 PROCEDURE — 3075F PR MOST RECENT SYSTOLIC BLOOD PRESS GE 130-139MM HG: ICD-10-PCS | Mod: CPTII,S$GLB,, | Performed by: STUDENT IN AN ORGANIZED HEALTH CARE EDUCATION/TRAINING PROGRAM

## 2019-07-03 PROCEDURE — 99214 PR OFFICE/OUTPT VISIT, EST, LEVL IV, 30-39 MIN: ICD-10-PCS | Mod: S$GLB,,, | Performed by: STUDENT IN AN ORGANIZED HEALTH CARE EDUCATION/TRAINING PROGRAM

## 2019-07-03 PROCEDURE — 99214 OFFICE O/P EST MOD 30 MIN: CPT | Mod: S$GLB,,, | Performed by: STUDENT IN AN ORGANIZED HEALTH CARE EDUCATION/TRAINING PROGRAM

## 2019-07-03 PROCEDURE — 3078F DIAST BP <80 MM HG: CPT | Mod: CPTII,S$GLB,, | Performed by: STUDENT IN AN ORGANIZED HEALTH CARE EDUCATION/TRAINING PROGRAM

## 2019-07-03 PROCEDURE — 99999 PR PBB SHADOW E&M-EST. PATIENT-LVL V: CPT | Mod: PBBFAC,,, | Performed by: STUDENT IN AN ORGANIZED HEALTH CARE EDUCATION/TRAINING PROGRAM

## 2019-07-03 NOTE — PROGRESS NOTES
Subjective:   Patient ID:  Socorro Amaro is a 63 y.o. female who presents for follow up.    HPI: She has a hx of an embolic LAD STEMI 6/2018 s/p successful embolectomy (no stenting). The event was initially though secondary to self reported atrial fibrillation in settting of a hx of reported frequent palpitations. She was started on eliquis for anticoagulation at the time. No objective documentation of AF however. She subsequently had a TTE with bubble that was positive for possible sinus venosus ASD with right to left shunt. She had a MARINE with a small secundum septal defect with left to right shunting. She underwent closure of the ASD with Dr. Mir on 3/7/2019 with a 25mm [place] Cardioform device. She was started on DAPT at that time and eliquis was stopped. She followed up with Dr. Mir on 6/24 - TTE at that time without residual shunt. Plavix was stopped.     She was referred back today for management of hypertension. At that visit, her BP was in 160s/80s. Today 130/64. Review of flowsheet data with Bps 130s-160s, average ~low 140s systolic, above goal <130/80. She does note recently returning from a 5 week trip in [place] and was eating out frequently, reports not being very good about limiting salt. Also notes some recent weight gain and motivated to lose some weight and improve her diet.     Denies any CP, SOB, RUSS, PND, orthopnea, MEGHA, presyncope/syncope. Does report recent worsening of her reflux symptoms- sour taste in AM, nonproductive cough, epigastric discomfort with certain foods. Compliant with her medications.       Past Medical History:   Diagnosis Date    HTN (hypertension)     STEMI (ST elevation myocardial infarction) 06/30/2018    embolic event    Thyroid disease        Past Surgical History:   Procedure Laterality Date    CARDIAC CATHETERIZATION      Embolic occlusions of LAD, No other CAD    Closure, PFO  N/A 3/7/2019    Performed by Hernandez Mir MD at [facility] CATH [facility]    CORONARY  ANGIOPLASTY  06/2018    LAD thrombectomy in [place]    ECHOCARDIOGRAM,TRANSESOPHAGEAL N/A 12/14/2018    Performed by [facility] at [facility] EP [facility]    PARTIAL HYSTERECTOMY  12/2016    RHINOPLASTY TIP      2006/2006    [id number] Bilateral 12/30/2016    Performed by [facility] at [facility] OR    TONSILLECTOMY, ADENOIDECTOMY         Social History     Socioeconomic History    Marital status:      Spouse name: Not on file    Number of children: Not on file    Years of education: Not on file    Highest education level: Not on file   Occupational History    Not on file   Social Needs    Financial resource strain: Not on file    Food insecurity:     Worry: Not on file     Inability: Not on file    Transportation needs:     Medical: Not on file     Non-medical: Not on file   Tobacco Use    Smoking status: Never Smoker    Smokeless tobacco: Never Used   Substance and Sexual Activity    Alcohol use: Yes     Alcohol/week: 1.8 oz     Types: 1 Glasses of wine, 1 Cans of beer, 1 Shots of liquor per week     Comment: 5x week    Drug use: Not on file    Sexual activity: Yes     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 ,   no kids    Lifestyle    Physical activity:     Days per week: Not on file     Minutes per session: Not on file    Stress: Not on file   Relationships    Social connections:     Talks on phone: Not on file     Gets together: Not on file     Attends Faith service: Not on file     Active member of club or organization: Not on file     Attends meetings of clubs or organizations: Not on file     Relationship status: Not on file   Other Topics Concern    Not on file   Social History Narrative    Not on file       Family History   Problem Relation Age of Onset    Arthritis Mother     Cancer Mother 72        spindle cell ca  tongue     Hypertension Mother     Hyperlipidemia Mother     Depression Sister     Hypertension Brother      Depression Sister     Hypertension Father     Heart disease Father        Patient's Medications   New Prescriptions    No medications on file   Previous Medications    5-HYDROXYTRYPTOPHAN (5-HTP) 100 MG CAP    Take by mouth every evening.     ALPRAZOLAM (XANAX) 0.25 MG TABLET    TAKE 1 TABLET BY MOUTH 1 hour before flight    ASPIRIN (ECOTRIN) 81 MG EC TABLET    Take 81 mg by mouth once daily.    CHOLECALCIFEROL, VITAMIN D3, (VITAMIN D3) 1,000 UNIT CAPSULE    Vitamin D3    CO-ENZYME Q-10 30 MG CAPSULE    Take 300 mg by mouth once daily.    CYANOCOBALAMIN (VITAMIN B-12) 1000 MCG TABLET    Take 5,000 mcg by mouth once daily.    FLU VAC [id number] 36MOS UP,PF, (FLUZONE QUAD 7020-5595, PF,) 60 MCG (15 MCG X 4)/0.5 ML SYRG    Fluzone Quad 2018-19(PF) 60 mcg(15 mcgx4)/0.5 mL intramuscular syringe    LEVOTHYROXINE (SYNTHROID) 75 MCG TABLET    Brand only TAKE ONE TABLET BY MOUTH ONE TIME DAILY BEFORE BRRAKFAST    LORATADINE (CLARITIN) 10 MG TABLET    Allerclear 10 mg tablet   Take 1 tablet as needed by oral route.    LOSARTAN (COZAAR) 50 MG TABLET    Take 50 mg by mouth once daily.    LOTEPREDNOL (ALREX) 0.2 % DRPS    Alrex 0.2 % eye drops,suspension    FAUSTO, BULK, MISC    fausto (bulk)    METOPROLOL TARTRATE (LOPRESSOR) 25 MG TABLET    Take 1 tablet (25 mg total) by mouth 2 (two) times daily.    MULTIVITAMIN CAPSULE    Take 1 capsule by mouth once daily.    OLOPATADINE (PATANOL) 0.1 % OPHTHALMIC SOLUTION    Place 1 drop into both eyes 2 (two) times daily.    OMEPRAZOLE (PRILOSEC) 40 MG CAPSULE    omeprazole 40 mg capsule,delayed release   Take 1 capsule every day by oral route.    PHOSPHATIDYL SERINE, BULK, MISC    1 capsule by Misc.(Non-Drug; Combo Route) route once daily. 150 mg per capsule    PRASTERONE, DHEA, (DHEA ORAL)    Take by mouth once daily. 50 mg    RANITIDINE (ZANTAC) 300 MG TABLET    ranitidine 300 mg tablet as needed   Modified Medications    No medications on file   Discontinued Medications    No  "medications on file       Review of Systems   Constitution: Negative for malaise/fatigue and weight gain.   HENT: Negative for hearing loss.    Eyes: Negative for visual disturbance.   Cardiovascular: Negative for chest pain, claudication, dyspnea on exertion, leg swelling, near-syncope, orthopnea, palpitations, paroxysmal nocturnal dyspnea and syncope.   Respiratory: Negative for cough, shortness of breath, sleep disturbances due to breathing, snoring and wheezing.    Endocrine: Negative for cold intolerance, heat intolerance, polydipsia, polyphagia and polyuria.   Hematologic/Lymphatic: Negative for bleeding problem. Does not bruise/bleed easily.   Skin: Negative for rash and suspicious lesions.   Musculoskeletal: Negative for arthritis, falls, joint pain, muscle weakness and myalgias.   Gastrointestinal: Negative for abdominal pain, change in bowel habit, constipation, diarrhea, heartburn, hematochezia, melena and nausea.   Genitourinary: Negative for hematuria and nocturia.   Neurological: Negative for excessive daytime sleepiness, dizziness, headaches, light-headedness, loss of balance and weakness.   Psychiatric/Behavioral: Negative for depression. The patient is not nervous/anxious.    Allergic/Immunologic: Negative for environmental allergies.       /64 (BP Location: Left arm, Patient Position: Sitting, BP Method: Medium (Automatic))   Pulse 71   Ht 5' 6.5" (1.689 m)   Wt 82.3 kg (181 lb 7 oz)   BMI 28.85 kg/m²     Objective:   Physical Exam   Constitutional: She is oriented to person, place, and time. She appears well-developed and well-nourished.        HENT:   Head: Normocephalic and atraumatic.   Mouth/Throat: Oropharynx is clear and moist.   Eyes: Pupils are equal, round, and reactive to light. Conjunctivae and EOM are normal. No scleral icterus.   Neck: Normal range of motion. Neck supple. No hepatojugular reflux and no JVD present. No tracheal deviation present. No thyromegaly present. "   Cardiovascular: Normal rate, regular rhythm, normal heart sounds and intact distal pulses. PMI is not displaced.   Pulses:       Carotid pulses are 2+ on the right side, and 2+ on the left side.       Radial pulses are 2+ on the right side, and 2+ on the left side.        Dorsalis pedis pulses are 2+ on the right side, and 2+ on the left side.        Posterior tibial pulses are 2+ on the right side, and 2+ on the left side.   Pulmonary/Chest: Effort normal and breath sounds normal.   Abdominal: Soft. Bowel sounds are normal. She exhibits no distension and no mass. There is no hepatosplenomegaly. There is no tenderness.   Musculoskeletal: She exhibits no edema or tenderness.   Lymphadenopathy:     She has no cervical adenopathy.   Neurological: She is alert and oriented to person, place, and time.   Skin: Skin is warm and dry. No rash noted. No cyanosis or erythema. Nails show no clubbing.   Psychiatric: She has a normal mood and affect. Her speech is normal and behavior is normal.       Lab Results   Component Value Date     03/07/2019    K 4.2 03/07/2019     03/07/2019    CO2 25 03/07/2019    BUN 14 03/07/2019    CREATININE 0.8 03/07/2019    GLU 86 03/07/2019    MG 2.0 10/22/2018    AST 24 02/04/2019    ALT 29 02/04/2019    ALBUMIN 3.8 02/04/2019    PROT 6.6 02/04/2019    BILITOT 0.7 02/04/2019    WBC 3.36 (L) 03/07/2019    HGB 13.3 03/07/2019    HCT 40.8 03/07/2019    MCV 89 03/07/2019     03/07/2019    INR 1.1 03/07/2019    TSH 0.860 10/22/2018    CHOL 139 02/04/2019    HDL 74 02/04/2019    LDLCALC 57.6 (L) 02/04/2019    TRIG 37 02/04/2019       Assessment:       1. Essential hypertension     2. ASD (atrial septal defect)     3. ST elevation myocardial infarction involving left anterior descending (LAD) coronary artery     4. History of percutaneous transcatheter closure of congenital ASD       63F with hx of embolic STEMI of LAD 6/2018 s/p successful embolectomy, ASD s/p percutaneous closure  3/2019, HTN presenting for follow up.  Now on ASA 81 daily, off plavix and NOAC.   BP above goal <130/80, advised uptitration of blood pressure regimen. Patient prefers to work on lifestyle modifications first. Counseled on low salt diet, weight loss, exercise and will reassess in 3 months.   Advised to monitor BP at home and keep a log.   Continue metoprolol and losartan for now.     Seen and discussed with Dr. Patton. RTC 3 months.    Marcio Hairston MD  Cardiology Fellow PGY-6  Pager: [phone]

## 2019-07-13 ENCOUNTER — PATIENT MESSAGE (OUTPATIENT)
Dept: CARDIOLOGY | Facility: CLINIC | Age: 64
End: 2019-07-13

## 2019-10-04 ENCOUNTER — OFFICE VISIT (OUTPATIENT)
Dept: CARDIOLOGY | Facility: CLINIC | Age: 64
End: 2019-10-04
Payer: COMMERCIAL

## 2019-10-04 VITALS
HEIGHT: 66 IN | WEIGHT: 177.69 LBS | SYSTOLIC BLOOD PRESSURE: 131 MMHG | BODY MASS INDEX: 28.56 KG/M2 | DIASTOLIC BLOOD PRESSURE: 80 MMHG | HEART RATE: 53 BPM

## 2019-10-04 DIAGNOSIS — Z87.74 HISTORY OF PERCUTANEOUS TRANSCATHETER CLOSURE OF CONGENITAL ASD: Primary | ICD-10-CM

## 2019-10-04 DIAGNOSIS — I10 ESSENTIAL HYPERTENSION: ICD-10-CM

## 2019-10-04 DIAGNOSIS — I21.02 ST ELEVATION MYOCARDIAL INFARCTION INVOLVING LEFT ANTERIOR DESCENDING (LAD) CORONARY ARTERY: ICD-10-CM

## 2019-10-04 PROBLEM — Q21.10 ATRIAL SEPTAL DEFECT: Status: RESOLVED | Noted: 2018-12-14 | Resolved: 2019-10-04

## 2019-10-04 PROCEDURE — 99214 PR OFFICE/OUTPT VISIT, EST, LEVL IV, 30-39 MIN: ICD-10-PCS | Mod: S$GLB,,, | Performed by: INTERNAL MEDICINE

## 2019-10-04 PROCEDURE — 3008F BODY MASS INDEX DOCD: CPT | Mod: CPTII,S$GLB,, | Performed by: INTERNAL MEDICINE

## 2019-10-04 PROCEDURE — 3075F SYST BP GE 130 - 139MM HG: CPT | Mod: CPTII,S$GLB,, | Performed by: INTERNAL MEDICINE

## 2019-10-04 PROCEDURE — 99999 PR PBB SHADOW E&M-EST. PATIENT-LVL III: CPT | Mod: PBBFAC,,, | Performed by: INTERNAL MEDICINE

## 2019-10-04 PROCEDURE — 99999 PR PBB SHADOW E&M-EST. PATIENT-LVL III: ICD-10-PCS | Mod: PBBFAC,,, | Performed by: INTERNAL MEDICINE

## 2019-10-04 PROCEDURE — 3075F PR MOST RECENT SYSTOLIC BLOOD PRESS GE 130-139MM HG: ICD-10-PCS | Mod: CPTII,S$GLB,, | Performed by: INTERNAL MEDICINE

## 2019-10-04 PROCEDURE — 3079F PR MOST RECENT DIASTOLIC BLOOD PRESSURE 80-89 MM HG: ICD-10-PCS | Mod: CPTII,S$GLB,, | Performed by: INTERNAL MEDICINE

## 2019-10-04 PROCEDURE — 3008F PR BODY MASS INDEX (BMI) DOCUMENTED: ICD-10-PCS | Mod: CPTII,S$GLB,, | Performed by: INTERNAL MEDICINE

## 2019-10-04 PROCEDURE — 99214 OFFICE O/P EST MOD 30 MIN: CPT | Mod: S$GLB,,, | Performed by: INTERNAL MEDICINE

## 2019-10-04 PROCEDURE — 3079F DIAST BP 80-89 MM HG: CPT | Mod: CPTII,S$GLB,, | Performed by: INTERNAL MEDICINE

## 2019-10-04 RX ORDER — METOPROLOL SUCCINATE 25 MG/1
25 TABLET, EXTENDED RELEASE ORAL DAILY
Qty: 90 TABLET | Refills: 3 | Status: SHIPPED | OUTPATIENT
Start: 2019-10-04 | End: 2020-06-08

## 2019-10-04 NOTE — PROGRESS NOTES
Subjective:   Patient ID:  Socorro Amaro is a 64 y.o. female who presents for follow-up of Essential hypertension (3 months fu)      HPI: She has been feeling well. She has started a plant based diet and gave up drinking alcohol. She has lost about 8 lbs. Socorro Amaro denies any chest pain, shortness of breath, PND, orthopnea, palpitations, leg edema, or syncope. Her blood pressure readings at home run about 120-130/70-80 after taking her medication. She feels a little fatigued. Her last echo was 6/19 and showed an LVEF of 60% without a residual shunt.    ECG:(3/7/19): Sinus bradycardia 57 bpm    Past Medical History:   Diagnosis Date    ASD (atrial septal defect)     HTN (hypertension)     STEMI (ST elevation myocardial infarction) 06/30/2018    embolic event    Thyroid disease        Past Surgical History:   Procedure Laterality Date    ASD REPAIR  03/2019    percutanous ASD closure    CARDIAC CATHETERIZATION      Embolic occlusions of LAD, No other CAD    CORONARY ANGIOPLASTY  06/2018    LAD thrombectomy in [place]    PARTIAL HYSTERECTOMY  12/2016    RHINOPLASTY TIP      2006/2006    TONSILLECTOMY, ADENOIDECTOMY         Social History     Socioeconomic History    Marital status:      Spouse name: Not on file    Number of children: Not on file    Years of education: Not on file    Highest education level: Not on file   Occupational History    Not on file   Social Needs    Financial resource strain: Not on file    Food insecurity:     Worry: Not on file     Inability: Not on file    Transportation needs:     Medical: Not on file     Non-medical: Not on file   Tobacco Use    Smoking status: Never Smoker    Smokeless tobacco: Never Used   Substance and Sexual Activity    Alcohol use: Yes     Alcohol/week: 3.0 standard drinks     Types: 1 Glasses of wine, 1 Cans of beer, 1 Shots of liquor per week     Comment: 5x week    Drug use: Not on file    Sexual activity: Yes      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 ,   no kids    Lifestyle    Physical activity:     Days per week: Not on file     Minutes per session: Not on file    Stress: Not on file   Relationships    Social connections:     Talks on phone: Not on file     Gets together: Not on file     Attends Holiness service: Not on file     Active member of club or organization: Not on file     Attends meetings of clubs or organizations: Not on file     Relationship status: Not on file   Other Topics Concern    Not on file   Social History Narrative    Not on file       Family History   Problem Relation Age of Onset    Arthritis Mother     Cancer Mother 72        spindle cell ca  tongue     Hypertension Mother     Hyperlipidemia Mother     Depression Sister     Hypertension Brother     Depression Sister     Hypertension Father     Heart disease Father        Patient's Medications   New Prescriptions    METOPROLOL SUCCINATE (TOPROL-XL) 25 MG 24 HR TABLET    Take 1 tablet (25 mg total) by mouth once daily.   Previous Medications    ALPRAZOLAM (XANAX) 0.25 MG TABLET    TAKE 1 TABLET BY MOUTH 1 hour before flight    ASPIRIN (ECOTRIN) 81 MG EC TABLET    Take 81 mg by mouth once daily.    CHOLECALCIFEROL, VITAMIN D3, (VITAMIN D3) 1,000 UNIT CAPSULE    Take 1,000 Units by mouth once daily.     CO-ENZYME Q-10 30 MG CAPSULE    Take 300 mg by mouth once daily.    CYANOCOBALAMIN (VITAMIN B-12) 1000 MCG TABLET    Take 5,000 mcg by mouth once daily.    LEVOTHYROXINE (SYNTHROID) 75 MCG TABLET    Brand only TAKE ONE TABLET BY MOUTH ONE TIME DAILY BEFORE BRRAKFAST    LORATADINE (CLARITIN) 10 MG TABLET    Allerclear 10 mg tablet   Take 1 tablet as needed by oral route.    LOSARTAN (COZAAR) 50 MG TABLET    Take 1 tablet (50 mg total) by mouth once daily.    LOTEPREDNOL (ALREX) 0.2 % DRPS    once daily.     FAUSTO, BULK, MISC    once daily.     MULTIVITAMIN CAPSULE    Take 1 capsule by mouth once daily.    OLOPATADINE (PATANOL)  0.1 % OPHTHALMIC SOLUTION    Place 1 drop into both eyes 2 (two) times daily.    OMEPRAZOLE (PRILOSEC) 40 MG CAPSULE    omeprazole 40 mg capsule,delayed release   Take 1 capsule every day by oral route.    PHOSPHATIDYL SERINE, BULK, MISC    1 capsule by Misc.(Non-Drug; Combo Route) route once daily. 150 mg per capsule    PRASTERONE, DHEA, (DHEA ORAL)    Take by mouth once daily. 50 mg    RANITIDINE (ZANTAC) 300 MG TABLET    ranitidine 300 mg tablet as needed   Modified Medications    No medications on file   Discontinued Medications    5-HYDROXYTRYPTOPHAN (5-HTP) 100 MG CAP    Take by mouth every evening.     FLU VAC [id number] 36MOS UP,PF, (FLUZONE QUAD 4465-4811, PF,) 60 MCG (15 MCG X 4)/0.5 ML SYRG    Fluzone Quad 2018-19(PF) 60 mcg(15 mcgx4)/0.5 mL intramuscular syringe    METOPROLOL TARTRATE (LOPRESSOR) 25 MG TABLET    Take 1 tablet (25 mg total) by mouth 2 (two) times daily.       Review of Systems   Constitution: Positive for malaise/fatigue. Negative for weight gain.   HENT: Negative for hearing loss.    Eyes: Negative for visual disturbance.   Cardiovascular: Negative for chest pain, claudication, dyspnea on exertion, leg swelling, near-syncope, orthopnea, palpitations, paroxysmal nocturnal dyspnea and syncope.   Respiratory: Negative for cough, shortness of breath, sleep disturbances due to breathing, snoring and wheezing.    Endocrine: Negative for cold intolerance, heat intolerance, polydipsia, polyphagia and polyuria.   Hematologic/Lymphatic: Negative for bleeding problem. Does not bruise/bleed easily.   Skin: Negative for rash and suspicious lesions.   Musculoskeletal: Negative for arthritis, falls, joint pain, muscle weakness and myalgias.   Gastrointestinal: Negative for abdominal pain, change in bowel habit, constipation, diarrhea, heartburn, hematochezia, melena and nausea.   Genitourinary: Negative for hematuria and nocturia.   Neurological: Negative for excessive daytime sleepiness, dizziness,  "headaches, light-headedness, loss of balance and weakness.   Psychiatric/Behavioral: Negative for depression. The patient is not nervous/anxious.    Allergic/Immunologic: Negative for environmental allergies.       /80 (BP Location: Left arm, Patient Position: Sitting, BP Method: Large (Automatic))   Pulse (!) 53   Ht 5' 6" (1.676 m)   Wt 80.6 kg (177 lb 11.1 oz)   BMI 28.68 kg/m²     Objective:   Physical Exam   Constitutional: She appears well-developed and well-nourished.   HENT:   Head: Normocephalic and atraumatic.   Eyes: Conjunctivae are normal.   Neck: Normal range of motion. Neck supple. No JVD present. No tracheal deviation present. No thyromegaly present.   Cardiovascular: Bradycardia present.       Lab Results   Component Value Date     03/07/2019    K 4.2 03/07/2019     03/07/2019    CO2 25 03/07/2019    BUN 14 03/07/2019    CREATININE 0.8 03/07/2019    GLU 86 03/07/2019    MG 2.0 10/22/2018    AST 24 02/04/2019    ALT 29 02/04/2019    ALBUMIN 3.8 02/04/2019    PROT 6.6 02/04/2019    BILITOT 0.7 02/04/2019    WBC 3.36 (L) 03/07/2019    HGB 13.3 03/07/2019    HCT 40.8 03/07/2019    MCV 89 03/07/2019     03/07/2019    INR 1.1 03/07/2019    TSH 0.860 10/22/2018    CHOL 139 02/04/2019    HDL 74 02/04/2019    LDLCALC 57.6 (L) 02/04/2019    TRIG 37 02/04/2019       Assessment:     1. History of percutaneous transcatheter closure of congenital ASD : Continue asa.   2. Essential hypertension : I have enrolled her in the Digital HTN Program. Metoprolol changed to succinate 25 mg daily.   3. ST elevation myocardial infarction involving left anterior descending (LAD) coronary artery        Plan:     Socorro was seen today for essential hypertension.    Diagnoses and all orders for this visit:    History of percutaneous transcatheter closure of congenital ASD  -     Cancel: Lipid panel; Future  -     Cancel: Comprehensive metabolic panel; Future    Essential hypertension  -     " Hypertension [email or web address] [facility] ([facility]) Enrollment Order  -     Hypertension Digital Medicine ([facility]): Assign Onboarding Questionnaires  -     Cancel: Lipid panel; Future  -     Cancel: Comprehensive metabolic panel; Future    ST elevation myocardial infarction involving left anterior descending (LAD) coronary artery    Other orders  -     metoprolol succinate (TOPROL-XL) 25 MG 24 hr tablet; Take 1 tablet (25 mg total) by mouth once daily.        Thank you for allowing me to participate in this patient's care. Please do not hesitate to contact me with any questions or concerns.

## 2019-10-29 ENCOUNTER — TELEPHONE (OUTPATIENT)
Dept: OBSTETRICS AND GYNECOLOGY | Facility: CLINIC | Age: 64
End: 2019-10-29

## 2019-10-29 NOTE — TELEPHONE ENCOUNTER
----- Message from Tessa Little sent at 10/29/2019 12:44 PM CDT -----  Contact: Pt    Name of Who is Calling:RAJ PARK [[id number]]    What is the request in detail: Patient would like to come in for annual wellness exam Please contact to further discuss and advise    Can the clinic reply by [facility]: no    What Number to Call Back [facility] not [facility]: [phone]

## 2019-11-08 ENCOUNTER — HOSPITAL ENCOUNTER (OUTPATIENT)
Dept: RADIOLOGY | Facility: OTHER | Age: 64
Discharge: HOME | End: 2019-11-08
Attending: INTERNAL MEDICINE
Payer: COMMERCIAL

## 2019-11-08 DIAGNOSIS — Z12.39 SCREENING FOR BREAST CANCER: ICD-10-CM

## 2019-11-08 PROCEDURE — 77067 SCR MAMMO BI INCL CAD: CPT | Mod: 26,,, | Performed by: RADIOLOGY

## 2019-11-08 PROCEDURE — 77067 MAMMO DIGITAL SCREENING BILAT WITH TOMOSYNTHESIS_CAD: ICD-10-PCS | Mod: 26,,, | Performed by: RADIOLOGY

## 2019-11-08 PROCEDURE — 77063 MAMMO DIGITAL SCREENING BILAT WITH TOMOSYNTHESIS_CAD: ICD-10-PCS | Mod: 26,,, | Performed by: RADIOLOGY

## 2019-11-08 PROCEDURE — 77063 BREAST TOMOSYNTHESIS BI: CPT | Mod: TC

## 2019-11-08 PROCEDURE — 77063 BREAST TOMOSYNTHESIS BI: CPT | Mod: 26,,, | Performed by: RADIOLOGY

## 2019-11-11 ENCOUNTER — TELEPHONE (OUTPATIENT)
Dept: RADIOLOGY | Facility: OTHER | Age: 64
End: 2019-11-11

## 2019-11-13 ENCOUNTER — HOSPITAL ENCOUNTER (OUTPATIENT)
Dept: RADIOLOGY | Facility: HOSPITAL | Age: 64
Discharge: HOME | End: 2019-11-13
Attending: INTERNAL MEDICINE
Payer: COMMERCIAL

## 2019-11-13 ENCOUNTER — OFFICE VISIT (OUTPATIENT)
Dept: GENETICS | Facility: CLINIC | Age: 64
End: 2019-11-13
Payer: COMMERCIAL

## 2019-11-13 VITALS
SYSTOLIC BLOOD PRESSURE: 138 MMHG | DIASTOLIC BLOOD PRESSURE: 61 MMHG | HEART RATE: 61 BPM | HEIGHT: 66 IN | WEIGHT: 175.63 LBS | BODY MASS INDEX: 28.22 KG/M2

## 2019-11-13 DIAGNOSIS — Z80.0 FAMILY HISTORY OF LYNCH SYNDROME: ICD-10-CM

## 2019-11-13 DIAGNOSIS — R92.8 ABNORMAL MAMMOGRAM: ICD-10-CM

## 2019-11-13 DIAGNOSIS — Z80.0 FAMILY HISTORY OF LYNCH SYNDROME: Primary | ICD-10-CM

## 2019-11-13 PROCEDURE — 77061 BREAST TOMOSYNTHESIS UNI: CPT | Mod: 26,,, | Performed by: RADIOLOGY

## 2019-11-13 PROCEDURE — 96040 PR GENETIC COUNSELING, EACH 30 MIN: ICD-10-PCS | Mod: S$GLB,,, | Performed by: MEDICAL GENETICS

## 2019-11-13 PROCEDURE — 77065 DX MAMMO INCL CAD UNI: CPT | Mod: TC,PO

## 2019-11-13 PROCEDURE — 99499 UNLISTED E&M SERVICE: CPT | Mod: S$GLB,,, | Performed by: MEDICAL GENETICS

## 2019-11-13 PROCEDURE — 77061 MAMMO DIGITAL DIAGNOSTIC RIGHT WITH TOMOSYNTHESIS_CAD: ICD-10-PCS | Mod: 26,,, | Performed by: RADIOLOGY

## 2019-11-13 PROCEDURE — 77065 DX MAMMO INCL CAD UNI: CPT | Mod: 26,,, | Performed by: RADIOLOGY

## 2019-11-13 PROCEDURE — 76642 ULTRASOUND BREAST LIMITED: CPT | Mod: TC,PO,RT

## 2019-11-13 PROCEDURE — 96040 PR GENETIC COUNSELING, EACH 30 MIN: CPT | Mod: S$GLB,,, | Performed by: MEDICAL GENETICS

## 2019-11-13 PROCEDURE — 99499 NO LOS: ICD-10-PCS | Mod: S$GLB,,, | Performed by: MEDICAL GENETICS

## 2019-11-13 PROCEDURE — 99999 PR PBB SHADOW E&M-EST. PATIENT-LVL III: CPT | Mod: PBBFAC,,,

## 2019-11-13 PROCEDURE — 99999 PR PBB SHADOW E&M-EST. PATIENT-LVL III: ICD-10-PCS | Mod: PBBFAC,,,

## 2019-11-13 PROCEDURE — 76642 ULTRASOUND BREAST LIMITED: CPT | Mod: 26,RT,, | Performed by: RADIOLOGY

## 2019-11-13 PROCEDURE — 77061 BREAST TOMOSYNTHESIS UNI: CPT | Mod: TC,PO

## 2019-11-13 PROCEDURE — 76642 US BREAST RIGHT LIMITED: ICD-10-PCS | Mod: 26,RT,, | Performed by: RADIOLOGY

## 2019-11-13 PROCEDURE — 77065 MAMMO DIGITAL DIAGNOSTIC RIGHT WITH TOMOSYNTHESIS_CAD: ICD-10-PCS | Mod: 26,,, | Performed by: RADIOLOGY

## 2019-11-13 NOTE — LETTER
November 15, 2019      Joy Rice MD  [address]  [address]  [facility] [address]           [facility]  [address]  [facility] [address]  Phone: [phone]          Patient: Socorro Amaro   MR Number: [id number]   YOB: 1955   Date of Visit: 11/13/2019       Dear Dr. Joy Rice:    Thank you for referring Socorro Amaro to me for evaluation. Attached you will find relevant portions of my assessment and plan of care.    If you have questions, please do not hesitate to call me. I look forward to following Socorro Amaro along with you.    Sincerely,    Alie Almanza, MS    Enclosure  CC:  No Recipients    If you would like to receive this communication electronically, please contact [email or web address] or [phone] to request more information on [employer] Link access.    For providers and/or their staff who would like to refer a patient to [facility], please contact us through our one-stop-shop provider referral line, [facility] , at [phone].    If you feel you have received this communication in error or would no longer like to receive these types of communications, please e-mail [email or web address]

## 2019-12-04 ENCOUNTER — OFFICE VISIT (OUTPATIENT)
Dept: OBSTETRICS AND GYNECOLOGY | Facility: CLINIC | Age: 64
End: 2019-12-04
Payer: COMMERCIAL

## 2019-12-04 VITALS — WEIGHT: 177.69 LBS | HEIGHT: 66 IN | BODY MASS INDEX: 28.56 KG/M2

## 2019-12-04 DIAGNOSIS — Z01.419 WELL WOMAN EXAM WITH ROUTINE GYNECOLOGICAL EXAM: Primary | ICD-10-CM

## 2019-12-04 PROCEDURE — 99999 PR PBB SHADOW E&M-EST. PATIENT-LVL III: ICD-10-PCS | Mod: PBBFAC,,, | Performed by: OBSTETRICS & GYNECOLOGY

## 2019-12-04 PROCEDURE — 99396 PREV VISIT EST AGE 40-64: CPT | Mod: S$GLB,,, | Performed by: OBSTETRICS & GYNECOLOGY

## 2019-12-04 PROCEDURE — 99396 PR PREVENTIVE VISIT,EST,40-64: ICD-10-PCS | Mod: S$GLB,,, | Performed by: OBSTETRICS & GYNECOLOGY

## 2019-12-04 PROCEDURE — 99999 PR PBB SHADOW E&M-EST. PATIENT-LVL III: CPT | Mod: PBBFAC,,, | Performed by: OBSTETRICS & GYNECOLOGY

## 2019-12-04 NOTE — PROGRESS NOTES
Subjective:       Patient ID: Socorro Amaro is a 64 y.o. female.    Chief Complaint:  Annual Exam      History of Present Illness  HPI  This 64 yr old P0 female is here for WWE.  She had normal pap last yr and is up to date with her mammogram. She is using DHEA.  She had DEXA last yr and showed osteopenia but not enough to treat.  She has no gyn issues.  She had Atrial septal defect fixed last year though the cath lab she has no gyn complaints.    GYN & OB History  No LMP recorded. Patient is postmenopausal.   Date of Last Pap: 2018    OB History    Para Term  AB Living   0 0 0 0 0 0   SAB TAB Ectopic Multiple Live Births   0 0 0 0         Review of Systems  Review of Systems   Constitutional: Negative for chills and fever.   Respiratory: Negative for shortness of breath.    Cardiovascular: Negative for chest pain.   Gastrointestinal: Negative for abdominal pain, nausea and vomiting.   Genitourinary: Negative for difficulty urinating, dyspareunia, genital sores, menstrual problem, pelvic pain, vaginal bleeding, vaginal discharge and vaginal pain.   Skin: Negative for wound.   Hematological: Negative for adenopathy.           Objective:   Physical Exam:   Constitutional: She is oriented to person, place, and time. She appears well-developed and well-nourished.    HENT:   Head: Normocephalic.    Eyes: EOM are normal.    Neck: Normal range of motion.    Cardiovascular: Normal rate.     Pulmonary/Chest: Effort normal. She exhibits no mass and no tenderness. Right breast exhibits no inverted nipple, no mass, no skin change and no tenderness. Left breast exhibits no inverted nipple, no mass, no skin change and no tenderness.        Abdominal: Soft. She exhibits no distension. There is no tenderness.     Genitourinary: Vagina normal and uterus normal. There is no rash, tenderness or lesion on the right labia. There is no rash, tenderness or lesion on the left labia. Uterus is not tender. Cervix is  normal. Right adnexum displays no mass, no tenderness and no fullness. Left adnexum displays no mass, no tenderness and no fullness. Cervix exhibits no discharge.           Musculoskeletal: Normal range of motion.       Neurological: She is alert and oriented to person, place, and time.    Skin: Skin is warm and dry.    Psychiatric: She has a normal mood and affect.          Assessment:        1. Well woman exam with routine gynecological exam               Plan:      Pap every 3 yrs  Mammogram yearly  Bone density repeat in few yrs.  Diet and exercise continue.

## 2019-12-06 ENCOUNTER — PATIENT MESSAGE (OUTPATIENT)
Dept: GENETICS | Facility: CLINIC | Age: 64
End: 2019-12-06

## 2019-12-06 ENCOUNTER — TELEPHONE (OUTPATIENT)
Dept: GENETICS | Facility: CLINIC | Age: 64
End: 2019-12-06

## 2019-12-06 NOTE — TELEPHONE ENCOUNTER
Left VM on mobile about genetic testing results. Requested a call back. Other number is not available. Will send message over the portal.

## 2019-12-18 ENCOUNTER — PATIENT MESSAGE (OUTPATIENT)
Dept: ADMINISTRATIVE | Facility: OTHER | Age: 64
End: 2019-12-18

## 2020-01-12 NOTE — PROGRESS NOTES
Socorro Amaro   DOS: 2019  : 1955  MRN: [id number]    REFERRING MD: Joy Rice MD     REASON FOR CONSULT: Our Medical Genetic Service was asked to evaluate this 64-year-old female with a family history of Hereditary Non-Polyposis Colon Cancer (HNPCC)/Shore syndrome.     PRESENT ILLNESS: Ms. Amaro does not have a personal history of cancer. She had 3 polyps during her first colonoscopy at age 52 that were benign. She had a colonoscopy at age 63 that was normal. She has regular mammograms and has never needed a breast biopsy. She had a bilateral salpingo-oophorectomy due to pain at age 62. Her uterus remains intact. Two years ago she had a heart attack and it was discovered that she had a PDA that was since repaired. At a recent family reunion, it was revealed that Ms. Bond two maternal first-cousins have Shore syndrome (do not have report or know mutation).     PAST MEDICAL HISTORY:  History of percutaneous transcatheter closure of congenital ASD  PFO (patent foramen ovale)  ASD (atrial septal defect)  STEMI (ST elevation myocardial infarction) due to embolic event  Hypothyroid  Pelvic pain in female  S/P laparoscopic BSO (bilateral salpingo-oophorectomy)  Ovarian cyst, left  Palpitations  HTN (hypertension)  Postmenopausal    FAMILY HISTORY: Ms. Amaro does not have children. She has three siblings with no history of cancer. Her mother  of pancreatic cancer at age 84. It may have started in the bile duct but she is not sure. Her maternal aunt  of cancer, but the type is unknown. Her maternal first-cousin tested positive for Shore syndrome (no report), and her two children also tested positive. Another maternal first-cousin has pancreatic cancer. Ms. Bond paternal aunt had breast cancer at age 81 and her paternal grandmother had uterine cancer at age 78. A three-generation pedigree was obtained and is scanned into the media tab.     IMPRESSION: After reviewing Ms. Bond personal  history of polyps and his family history of cancer, we discussed the possibility of a hereditary cancer syndrome.    We discussed that about 70% of cancers are sporadic, or due to chance, age, and environmental factors. About 10-15% of cancers are familial and are characterized by multiple individuals in a family affected with cancer. Only 5-10% of cancers are hereditary, and caused by a genetic predisposition syndrome, such as Hereditary Non-Polyposis Colon Cancer (HNPCC)/Shore syndrome.     Shore syndrome involves mismatch repair genes MLH1, MSH2, MSH6, PMS2, and EPCAM and increases an individuals risk for colon cancer, as well as other types of cancers such as endometrial cancer in women and gastric cancer. Surveillance for shore syndrome involves annual colonoscopies beginning at age 20. Upper endoscopies can be considered every 3-5 years beginning at age 40 at the time of colonoscopy. Urothelial screening can also be considered and involves annual urine analysis. Annual physical and neurological examinations can also be considered to screen for central nervous system (CNS) cancers. Risk percentages differ depending on which gene is implicated and regular colonoscopies and other screenings are recommended.     Because we do not know the familial mutation, we discussed the benefits, limitations, and possible results, including the possibility of a variant of uncertain significance, of the Shore/Colorectal High-Risk Panel from [employer] (APC, EPCAM, MLH1, MSH2, MSH6, MUTYH, PMS2). This panel includes the 5 genes associated with Shore syndrome as well as others associated with colon cancer risk. If Ms. Amaro is found to be positive, her siblings would be at 50% risk. If she is negative, she should still consider screening, particularly for the pancreas, based off of the family history. We discussed that ideally the best person to test in the family would be a family member more closely related to that of her cousin,  and other family members should still be tested regardless of Ms. Bond result.     Because Ms. Amaro does not have a personal history of cancer, we spent time discussing the Genetic Information Nondiscrimination Act (DEAN) that protects individuals from discrimination on the basis of genetic information from medical insurance providers and employers. The law does not cover life insurance or long-term disability insurance, however. Genetic testing in a person without a history of cancer can also provide undue worry and stress during testing, return of results, and subsequent screening if necessary. Ms. Amaro understood these implications and consented for testing.       RECOMMENDATIONS:  1. [facility] Colorectal cancer panel  2. Follow-up depending on results  3. Consider pancreatic cancer screening given family history     TIME SPENT: 30 minutes, with over 50% spent counseling.       Jovan Quintana M.D.                                                                                    Alie Almanza, MPH, MS                 Section Head - Medical Genetics                                                                                     Genetic Counselor  [facility]   sudden onset

## 2020-01-14 ENCOUNTER — PATIENT OUTREACH (OUTPATIENT)
Dept: OTHER | Facility: OTHER | Age: 65
End: 2020-01-14

## 2020-01-14 NOTE — LETTER
January 14, 2020     Socorro Amaro  [address]  [facility] [address]       Dear Socorro,    Welcome to [facility]! Our goal is to make care effective, proactive and convenient by using data you send us from home to better treat your chronic conditions.              My name is Shawna Romo, and I am your dedicated Digital Medicine clinician. As an expert in medication management, I will help ensure that the medications you are taking continue to provide the intended benefits and help you reach your goals. You can reach me directly at [phone] or by sending me a message directly through your [facility] account.      I am Mayte Sawant and I will be your health . My job is to help you identify lifestyle changes to improve your disease control. We will talk about nutrition, exercise, and other ways you may be able to adjust your current habits to better your health. Additionally, we will help ensure you are completing the tests and screenings that are necessary to help manage your conditions. You can reach me directly at  or by sending me a message directly through your [facility] account.    Most importantly, YOU are at the center of this team. Together, we will work to improve your overall health and encourage you to meet your goals for a healthier lifestyle.     What we expect from YOU:  · Please take frequent home blood pressure measurements. We ask that you take at least 1 blood pressure reading per week, but more information will better help us get you know you. Be sure you rest for a few minutes before taking the reading in a quiet, comfortable place.     Be available to receive phone calls or [employer] messages, when appropriate, from your care team. Please let us know if there are any specific days or times that work best for us to reach you via phone.     Complete routine tests and screenings. Dont worry, we will help keep you on track!           What you should  expect from your Digital Medicine Care Team:   We will work with you to create a personalized plan of care and provide you with encouragement and education, including regarding lifestyle changes, that could help you manage your disease states.     We will adjust your current medications, if needed, and continue to monitor your long-term progress.     We will provide you and your physician with monthly progress reports after you have been in the program for more than 30 days.     We will send you reminders through [employer] and text messages to help ensure you do not miss any testing deadlines to help manage your disease states.    You will be able to reach us by phone or through your [employer] account by clicking our names under Care Team on the right side of the home screen.    I look forward to working with you to achieve your blood pressure goals!    We look forward to working with you to help manage your health,    Sincerely,    Your Digital Medicine Team    Please visit our websites to learn more:   · Hypertension: [email or web address]      Remember, we are not available for emergencies. If you have an emergency, please contact your doctors office directly or call [facility] on-call ([phone] or [phone]) or [phone].

## 2020-01-14 NOTE — PROGRESS NOTES
Digital Medicine: Health  Introduction    Introduced Socorro Amaro to Digital Medicine. Discussed health  role and recommended lifestyle modifications.    Complete enrollment with patient. Patient verbally understands program details and BP technique. Patient states that the cuff is easy to operate. Patient has a history of a heart attack. She states that it was unexpected. Her doctor did not understand because she said he stated that her heart looked great. Since the heart attack, patient states that she has made numerous changes in the past 6-8 months.           The history is provided by the patient.     Follow Up  Follow-up reason(s): reading review and routine education      Routine Education Topics: eating patterns and physical activity      HYPERTENSION  Our goal is to get BP to consistently below 130/80mmHg and make the process convenient so patient can avoid extra trips to the office. Getting your blood pressure below 130/80mmHg (definition of control) will reduce your risk for heart attack, kidney failure, stroke and death (as well as kidney failure, eye disease, & dementia)      Reviewed that the Digital Medicine care team - consisting of a clinician and a health  - will follow the most current evidence-based national guidelines for treating your condition.  The health  will focus on lifestyle modifications and motivation while the clinician will focus on medication therapy.  The care team will review all data on a regular basis and reach out as needed.      Explained that one of the key parts of the program is communication with the care team.  Asked patient to respond to outreach attempts and complete questionnaires.  Stressed importance of medication adherence.    Reviewed non-pharmacologic therapies and impact on BP.      Explained that we expect patient to obtain several blood pressures per week at random times of day.  Instructed patient not to allow anyone else to use phone and  "monitoring device.  Confirmed appropriate BP monitoring technique.      Explained to patient that the digital medicine team is not available for emergencies.  Patient will call [facility] on-call ([phone] or [phone]) or [phone] if needed.      Patient's BP goal is 130/80.Patient's BP average is 131/78 mmHg, which is above goal, per 2017 ACC/AHA Hypertension Guidelines.          Last 5 Patient Entered Readings                                      Current 30 Day Average: 131/78     Recent Readings 1/14/2020 1/14/2020 1/12/2020 1/12/2020 1/11/2020    SBP (mmHg) 137 148 129 121 127    DBP (mmHg) 88 83 67 81 75    Pulse 70 62 65 73 68            INTERVENTION(S)  reviewed monitoring technique, encouragement/support and goal setting    PLAN  patient verbalizes understanding, demonstrates understanding via teach back and patient amenable to changes    Will focus on lifestyle on HC outreach~1 week      There are no preventive care reminders to display for this patient.    Reviewed the importance of self-monitoring, medication adherence, and that the health  can be used as a resource for lifestyle modifications to help reduce or maintain a healthy lifestyle.    Sent link to [facility] [employer] webpages and my contact information via [employer] for future questions. Follow up scheduled.             Diet Screening       Since the heart attack, patient states that she has made numerous changes in the past 6-8 months. She tries to track what she drinks and eats. She would like to cut back on caffeine. She states that she has a great understanding as to what needs to be done to improve her health. She reports keeping updated with "[employer]" and reading recent articles that are evidence based.         Physical Activity Screening   When asked if exercising, patient responded: yes3 day(s) a week.      Over the year, patient had gotten a gym membership. She goes 2-3 times a week doing cardio and weight " lifting.     Medication Adherence Screening       Patient identified the following reasons for non-compliance: schedule concerns    Patient asked if she should take her medications together or separate. She currently takes her Losartan at night and her Metoprolol in the morning. Patient questioned if there was a certain time to take her medication. She does not enjoy taking a lot of pills all together.       SDOH   Skin Substitute Text: The defect edges were debeveled with a #15 scalpel blade.  Given the location of the defect, shape of the defect and the proximity to free margins a skin substitute graft was deemed most appropriate.  The graft material was trimmed to fit the size of the defect. The graft was then placed in the primary defect and oriented appropriately.

## 2020-01-14 NOTE — LETTER
January 14, 2020     Socorro Amaro  [address]  [facility] [address]       Dear Socorro,    Welcome to [facility]! Our goal is to make care effective, proactive and convenient by using data you send us from home to better treat your chronic conditions.              My name is Shawna Romo, and I am your dedicated Digital Medicine clinician. As an expert in medication management, I will help ensure that the medications you are taking continue to provide the intended benefits and help you reach your goals. You can reach me directly at [phone] or by sending me a message directly through your [facility] account.      I am Mayte Sawant and I will be your health . My job is to help you identify lifestyle changes to improve your disease control. We will talk about nutrition, exercise, and other ways you may be able to adjust your current habits to better your health. Additionally, we will help ensure you are completing the tests and screenings that are necessary to help manage your conditions. You can reach me directly at  or by sending me a message directly through your [facility] account.    Most importantly, YOU are at the center of this team. Together, we will work to improve your overall health and encourage you to meet your goals for a healthier lifestyle.     What we expect from YOU:  · Please take frequent home blood pressure measurements. We ask that you take at least 1 blood pressure reading per week, but more information will better help us get you know you. Be sure you rest for a few minutes before taking the reading in a quiet, comfortable place.     Be available to receive phone calls or [email or web address] messages, when appropriate, from your care team. Please let us know if there are any specific days or times that work best for us to reach you via phone.     Complete routine tests and screenings. Dont worry, we will help keep you on track!           What you should  expect from your Digital Medicine Care Team:   We will work with you to create a personalized plan of care and provide you with encouragement and education, including regarding lifestyle changes, that could help you manage your disease states.     We will adjust your current medications, if needed, and continue to monitor your long-term progress.     We will provide you and your physician with monthly progress reports after you have been in the program for more than 30 days.     We will send you reminders through [employer] and text messages to help ensure you do not miss any testing deadlines to help manage your disease states.    You will be able to reach us by phone or through your [employer] account by clicking our names under Care Team on the right side of the home screen.    I look forward to working with you to achieve your blood pressure goals!    We look forward to working with you to help manage your health,    Sincerely,    Your Digital Medicine Team    Please visit our websites to learn more:   · Hypertension: [email or web address]      Remember, we are not available for emergencies. If you have an emergency, please contact your doctors office directly or call [facility] on-call ([phone] or [phone]) or [phone].

## 2020-01-14 NOTE — PROGRESS NOTES
"Digital Medicine: Health  Introduction    Introduced Socorro Amaro to Digital Medicine. Discussed health  role and recommended lifestyle modifications.    Complete enrollment with patient. Patient verbally understands program details and BP technique. Patient states that the cuff is easy to operate. Patient has a history of a heart attack. She states that it was unexpected. Her doctor did not understand because she said he stated that her heart looked great. Since the heart attack, patient states that she has made numerous changes in the past 6-8 months.     The history is provided by the patient.     Follow Up  Follow-up reason(s): reading review      Routine Education Topics: eating patterns and physical activity              Intervention/Plan    There are no preventive care reminders to display for this patient.    Reviewed the importance of self-monitoring, medication adherence, and that the health  can be used as a resource for lifestyle modifications to help reduce or maintain a healthy lifestyle.    Sent link to [facility] [employer] [facility] webpages and my contact information via [employer] for future questions. Follow up scheduled.             Diet Screening       Since the heart attack, patient states that she has made numerous changes in the past 6-8 months. She tries to track what she drinks and eats. She would like to cut back on caffeine. She states that she has a great understanding as to what needs to be done to improve her health. She reports keeping updated with "[employer]" and reading recent articles that are evidence based.     Physical Activity Screening       Over the year, patient had gotten a gym membership. She goes 2-3 times a week doing cardio and weight lifting.         Medication Adherence Screening       Patient identified the following reasons for non-compliance: schedule concerns    Patient asked if she should take her medications together or separate. She currently " takes her Losartan at night and her Metoprolol in the morning. Patient questioned if there was a certain time to take her medication. She does not enjoy taking a lot of pills all together.       SDOH

## 2020-01-28 ENCOUNTER — PATIENT OUTREACH (OUTPATIENT)
Dept: OTHER | Facility: OTHER | Age: 65
End: 2020-01-28

## 2020-02-11 ENCOUNTER — PATIENT OUTREACH (OUTPATIENT)
Dept: OTHER | Facility: OTHER | Age: 65
End: 2020-02-11

## 2020-02-11 NOTE — PROGRESS NOTES
Patient's clinician had unsuccessful outreach today. Pushing next outreach out 2 weeks to follow-up on any lifestyle changes.

## 2020-02-12 NOTE — PROGRESS NOTES
Digital Medicine: Clinician Introduction    Socorro Amaro is a 64 y.o. female who is newly enrolled in the Digital Medicine Clinic.    The following information was reviewed and updated:  Preferred pharmacy: [facility] and July-October [facility].    Patient prefers a 90 days supply.     Review of patient's allergies indicates:   Allergen Reactions    Erythromycin base Nausea And Vomiting    Erythromycin Nausea And Vomiting     Severe vomiting       Called patient to welcome her to the program.    The history is provided by the patient.     HYPERTENSION  Our goal is to get BP to consistently below 130/80mmHg and make the process convenient so patient can avoid extra trips to the office. Getting your blood pressure below 130/80mmHg (definition of control) will reduce your risk for heart attack, kidney failure, stroke and death (as well as kidney failure, eye disease, & dementia)      Reviewed non-pharmacologic therapies and impact on BP      Explained that we expect patient to obtain several blood pressures per week at random times of day.  Instructed patient not to allow anyone else to use phone and monitoring device.  Confirmed appropriate BP monitoring technique.      Explained to patient that the digital medicine team is not available for emergencies.  [facility] on[facility] ([phone] or [phone]) or [phone] if needed.    Patient's BP goal is 130/80. Patients BP average is 131/81 mmHg, which is at or below goal, per 2017 ACC/AHA Hypertension Guidelines.    Patient has changed her diet to include more plant based diet with less meats.     She walks with her dog and goes to the gym twice a week for 20 minutes of cardio and 30-40 minutes of weight lifting.     Patient denies hypotension symptoms.       Med Review complete.    Allergies reviewed.      Last 5 Patient Entered Readings                                      Current 30 Day Average: 131/81     Recent Readings 2/12/2020 2/9/2020  2/9/2020 2/5/2020 2/5/2020    SBP (mmHg) 120 142 152 121 130    DBP (mmHg) 80 83 87 76 72    Pulse 73 72 70 74 70            INTERVENTION(S)  reviewed appropriate dose schedule, reviewed monitoring technique and encouragement/support    PLAN  patient verbalizes understanding, patient amenable to changes and continue monitoring    Patient will maintain her current hypertension medication regimen.    Patient will contact me with any hypotension symptoms prior to my next outreach.       There are no preventive care reminders to display for this patient.    Current Medication Regimen:  Hypertension Medications             losartan (COZAAR) 50 MG tablet Take 1 tablet (50 mg total) by mouth once daily.    metoprolol succinate (TOPROL-XL) 25 MG 24 hr tablet Take 1 tablet (25 mg total) by mouth once daily.            Reviewed the importance of self-monitoring, medication adherence, and that the health  can be used as a resource for lifestyle modifications to help reduce or maintain a healthy lifestyle.    Sent link to [facility] webpages and my contact information via [employer] for future questions. Follow up scheduled.         Screenings

## 2020-03-11 NOTE — PROGRESS NOTES
Digital Medicine: Health  Follow-Up    Patient is doing well. AVG. /77 which is at goal. Patient states that personally she would like for her blood pressure to be <110/80mmHg and would like to think that she does not have heart disease. Goal for program is 130/80mmHg and encouraged patient to continue her great efforts!  Patient is in [place] at the moment and for the next 5 days. Patient reports not bringing her BP device so we will not be receiving readings.           The history is provided by the patient.     Follow Up  Follow-up reason(s): reading review and routine education      Readings are trending down due to lifestyle change and medication adherence.    Routine Education Topics: eating patterns and physical activity        INTERVENTION(S)  encouragement/support, denied resources and denied questions    PLAN  patient verbalizes understanding and patient amenable to changes    Brief follow-up. Patient is doing well with lifestyle. Will follow-up in 6-8 weeks.       There are no preventive care reminders to display for this patient.    Last 5 Patient Entered Readings                                      Current 30 Day Average: 124/77     Recent Readings 3/8/2020 3/7/2020 3/7/2020 3/4/2020 3/3/2020    SBP (mmHg) 116 116 135 125 132    DBP (mmHg) 80 75 81 67 71    Pulse 80 79 69 78 67                      Diet Screening   No change to diet.  She has the following dietary restrictions: low sodium diet    Barriers to a Healthy Diet: no barriers to healthy eating    Patient continues with 80% plaint based diet and 20% seafood. Patient finds a low sodium diet to be difficult. Patient does well maintaining her diastolic number and watching her sodium.       Assigning the following patient goals: maintain low sodium diet    Physical Activity Screening   When asked if exercising, patient responded: yes    She exercises for 120 minutes per day 7 day(s) a week.  Her level of intensity when exercising is  moderate.    Patient participates in the following activities: walking    She identified the following barriers to physical activity: no barriers to being active    Patient exercises 7 days a week going on 2-5 mile walks with her two dogs.       SDOH

## 2020-03-12 ENCOUNTER — PATIENT OUTREACH (OUTPATIENT)
Dept: OTHER | Facility: OTHER | Age: 65
End: 2020-03-12

## 2020-03-20 ENCOUNTER — PATIENT MESSAGE (OUTPATIENT)
Dept: GENETICS | Facility: CLINIC | Age: 65
End: 2020-03-20

## 2020-04-09 NOTE — PROGRESS NOTES
"Digital Medicine: Clinician Follow-Up    Called patient for hypertension follow-up. Ms. Quintanilla states that she is doing fine. She admits to not drinking water daily, only consumes about one glass daily. She does drink about 2 servings of coffee. "I really don't like water." She confirms compliance with losartan and metoprolol which she takes at night. Her readings are usually before her medications or the next morning after the nighttime dose.     The history is provided by the patient. No  was used.     Follow Up  Follow-up reason(s): reading review and routine education      Readings are trending down       INTERVENTION(S)  recommended diet modifications, reviewed monitoring technique and encouragement/support    PLAN  patient verbalizes understanding, patient amenable to changes and continue monitoring    Current 30-day avg of 127/76 is meeting goal of <130/80.  Reviewed readings: trending downwards where both are intermittently controlled.   Discussed adequate hydration. Goal is to drink 64 fluid oz daily.  Reviewed measuring technique. Patient understands to measure BP after medication is on board. 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     Continue current regimen.    Follow-up in 4-6 weeks.       There are no preventive care reminders to display for this patient.    Last 5 Patient Entered Readings                                      Current 30 Day Average: 127/76     Recent Readings 4/7/2020 4/6/2020 4/3/2020 4/2/2020 4/1/2020    SBP (mmHg) 132 114 142 136 131    DBP (mmHg) 68 77 83 79 72    Pulse 73 60 59 64 65             Hypertension Medications             losartan (COZAAR) 50 MG tablet Take 1 tablet (50 mg total) by mouth once daily.    metoprolol succinate (TOPROL-XL) 25 MG 24 hr tablet Take 1 tablet (25 mg total) by mouth once daily.                 Screenings  "

## 2020-04-28 ENCOUNTER — PATIENT MESSAGE (OUTPATIENT)
Dept: OTHER | Facility: OTHER | Age: 65
End: 2020-04-28

## 2020-05-27 ENCOUNTER — PATIENT OUTREACH (OUTPATIENT)
Dept: OTHER | Facility: OTHER | Age: 65
End: 2020-05-27

## 2020-06-08 RX ORDER — METOPROLOL SUCCINATE 25 MG/1
TABLET, EXTENDED RELEASE ORAL
Qty: 90 TABLET | Refills: 3 | Status: SHIPPED | OUTPATIENT
Start: 2020-06-08 | End: 2020-10-10 | Stop reason: SDUPTHER

## 2020-06-12 ENCOUNTER — LAB VISIT (OUTPATIENT)
Dept: LAB | Facility: HOSPITAL | Age: 65
End: 2020-06-12
Attending: INTERNAL MEDICINE
Payer: COMMERCIAL

## 2020-06-12 DIAGNOSIS — Z12.39 SCREENING FOR BREAST CANCER: ICD-10-CM

## 2020-06-12 DIAGNOSIS — Z11.9 SCREENING EXAMINATION FOR INFECTIOUS DISEASE: ICD-10-CM

## 2020-06-12 DIAGNOSIS — Z20.828 EXPOSURE TO SARS-ASSOCIATED CORONAVIRUS: ICD-10-CM

## 2020-06-12 DIAGNOSIS — I10 ESSENTIAL HYPERTENSION: ICD-10-CM

## 2020-06-12 DIAGNOSIS — E03.4 HYPOTHYROIDISM DUE TO ACQUIRED ATROPHY OF THYROID: ICD-10-CM

## 2020-06-12 LAB
ALBUMIN SERPL BCP-MCNC: 4.4 G/DL (ref 3.5–5.2)
ALP SERPL-CCNC: 84 U/L (ref 55–135)
ALT SERPL W/O P-5'-P-CCNC: 16 U/L (ref 10–44)
ANION GAP SERPL CALC-SCNC: 10 MMOL/L (ref 8–16)
AST SERPL-CCNC: 18 U/L (ref 10–40)
BILIRUB SERPL-MCNC: 0.5 MG/DL (ref 0.1–1)
BUN SERPL-MCNC: 12 MG/DL (ref 8–23)
CALCIUM SERPL-MCNC: 9.7 MG/DL (ref 8.7–10.5)
CHLORIDE SERPL-SCNC: 110 MMOL/L (ref 95–110)
CHOLEST SERPL-MCNC: 179 MG/DL (ref 120–199)
CHOLEST/HDLC SERPL: 2.1 {RATIO} (ref 2–5)
CO2 SERPL-SCNC: 23 MMOL/L (ref 23–29)
CREAT SERPL-MCNC: 0.8 MG/DL (ref 0.5–1.4)
EST. GFR  (AFRICAN AMERICAN): >60 ML/MIN/1.73 M^2
EST. GFR  (NON AFRICAN AMERICAN): >60 ML/MIN/1.73 M^2
GLUCOSE SERPL-MCNC: 87 MG/DL (ref 70–110)
HDLC SERPL-MCNC: 84 MG/DL (ref 40–75)
HDLC SERPL: 46.9 % (ref 20–50)
LDLC SERPL CALC-MCNC: 86.2 MG/DL (ref 63–159)
NONHDLC SERPL-MCNC: 95 MG/DL
POTASSIUM SERPL-SCNC: 4.4 MMOL/L (ref 3.5–5.1)
PROT SERPL-MCNC: 6.9 G/DL (ref 6–8.4)
SARS-COV-2 IGG SERPLBLD QL IA.RAPID: NEGATIVE
SODIUM SERPL-SCNC: 143 MMOL/L (ref 136–145)
T4 FREE SERPL-MCNC: 1.12 NG/DL (ref 0.71–1.51)
TRIGL SERPL-MCNC: 44 MG/DL (ref 30–150)
TSH SERPL DL<=0.005 MIU/L-ACNC: 0.37 UIU/ML (ref 0.4–4)

## 2020-06-12 PROCEDURE — 80053 COMPREHEN METABOLIC PANEL: CPT

## 2020-06-12 PROCEDURE — 86803 HEPATITIS C AB TEST: CPT

## 2020-06-12 PROCEDURE — 36415 COLL VENOUS BLD VENIPUNCTURE: CPT | Mod: PO

## 2020-06-12 PROCEDURE — 80061 LIPID PANEL: CPT

## 2020-06-12 PROCEDURE — 86769 SARS-COV-2 COVID-19 ANTIBODY: CPT

## 2020-06-12 PROCEDURE — 84443 ASSAY THYROID STIM HORMONE: CPT

## 2020-06-12 PROCEDURE — 84439 ASSAY OF FREE THYROXINE: CPT

## 2020-06-15 LAB — HCV AB SERPL QL IA: NEGATIVE

## 2020-07-08 ENCOUNTER — PATIENT MESSAGE (OUTPATIENT)
Dept: OTHER | Facility: OTHER | Age: 65
End: 2020-07-08

## 2020-07-22 ENCOUNTER — PATIENT OUTREACH (OUTPATIENT)
Dept: OTHER | Facility: OTHER | Age: 65
End: 2020-07-22

## 2020-08-06 ENCOUNTER — PATIENT OUTREACH (OUTPATIENT)
Dept: OTHER | Facility: OTHER | Age: 65
End: 2020-08-06

## 2020-09-03 ENCOUNTER — PATIENT OUTREACH (OUTPATIENT)
Dept: OTHER | Facility: OTHER | Age: 65
End: 2020-09-03

## 2020-09-03 NOTE — PROGRESS NOTES
ANA ROSAB to discuss her elevated blood pressure and consider making medication changes by increasing losartan.
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
## 2020-10-12 RX ORDER — METOPROLOL SUCCINATE 25 MG/1
25 TABLET, EXTENDED RELEASE ORAL DAILY
Qty: 90 TABLET | Refills: 3 | Status: SHIPPED | OUTPATIENT
Start: 2020-10-12 | End: 2021-11-02 | Stop reason: SDUPTHER

## 2020-10-22 ENCOUNTER — PATIENT OUTREACH (OUTPATIENT)
Dept: OTHER | Facility: OTHER | Age: 65
End: 2020-10-22

## 2020-10-22 DIAGNOSIS — I10 ESSENTIAL HYPERTENSION: Primary | ICD-10-CM

## 2020-10-30 RX ORDER — LOSARTAN POTASSIUM 100 MG/1
100 TABLET ORAL DAILY
Qty: 30 TABLET | Refills: 5 | Status: SHIPPED | OUTPATIENT
Start: 2020-10-30 | End: 2020-11-09 | Stop reason: SDUPTHER

## 2020-10-30 NOTE — PROGRESS NOTES
Digital Medicine: Clinician Follow-Up    Called patient to discuss her elevated BP.    The history is provided by the patient.   Follow-up reason(s): routine follow up.     Hypertension    Patient's blood pressure is stable.   Patient is not experiencing signs/symptoms of hypotension.  Patient is not experiencing signs/symptoms of hypertension.            Last 5 Patient Entered Readings                                      Current 30 Day Average: 141/82     Recent Readings 10/25/2020 10/25/2020 10/24/2020 10/22/2020 10/21/2020    SBP (mmHg) 130 133 144 138 143    DBP (mmHg) 82 82 81 81 81    Pulse 69 76 70 61 56                    ASSESSMENT(S)  Patients BP average is 141/82 mmHg, which is above goal. Patient's BP goal is less than or equal to 130/80.     BP remains elevated despite being at home for a month following her time in [place].      Hypertension Plan  Hypertension Medication Change. Increase losartan to 100mg daily. Maintain current metoprolol dose.       Addressed patient questions and patient has my contact information if needed prior to next outreach. Patient verbalizes understanding.             There are no preventive care reminders to display for this patient.  There are no preventive care reminders to display for this patient.      Hypertension Medications             losartan (COZAAR) 50 MG tablet Take 1 tablet (50 mg total) by mouth once daily.    metoprolol succinate (TOPROL-XL) 25 MG 24 hr tablet Take 1 tablet (25 mg total) by mouth once daily.

## 2020-10-30 NOTE — PROGRESS NOTES
Digital Medicine: Clinician Follow-Up    Called patient to discuss her elevated readings.    The history is provided by the patient.      Review of patient's allergies indicates:   -- Erythromycin base -- Nausea And Vomiting   -- Erythromycin -- Nausea And Vomiting    --  Severe vomiting  Follow-up reason(s): routine follow up.     Hypertension    Patient's blood pressure is stable.   Patient is not experiencing signs/symptoms of hypotension.  Patient is not experiencing signs/symptoms of hypertension.      Additional Follow-up details: Patient reported a dry cough that developed a couple months ago.          Last 5 Patient Entered Readings                                      Current 30 Day Average: 141/82     Recent Readings 10/25/2020 10/25/2020 10/24/2020 10/22/2020 10/21/2020    SBP (mmHg) 130 133 144 138 143    DBP (mmHg) 82 82 81 81 81    Pulse 69 76 70 61 56                 Depression Screening  Did not address depression screening.    Sleep Apnea Screening    Did not address sleep apnea screening.     Medication Affordability Screening  Did not address medication affordability screening.     Medication Adherence-Medication adherence was assessed.          ASSESSMENT(S)  Patients BP average is 141/82 mmHg, which is above goal. Patient's BP goal is less than or equal to 130/80.     BP remains uncontrolled despite returning home from [place].      Hypertension Plan  Hypertension Medication Change. Increase losartan to 100mg daily. Maintain current metoprolol dose.  If dry cough does not dissipate or worsens with the higher losartan dose, will plan a trial off losartan to assess if this is the cause.   Upcoming cardiologist appointment on 12/11.      Addressed patient questions and patient has my contact information if needed prior to next outreach. Patient verbalizes understanding.             There are no preventive care reminders to display for this patient.  There are no preventive care reminders to display for  this patient.      Hypertension Medications             losartan (COZAAR) 100 MG tablet Take 1 tablet (100 mg total) by mouth once daily.    metoprolol succinate (TOPROL-XL) 25 MG 24 hr tablet Take 1 tablet (25 mg total) by mouth once daily.

## 2020-11-09 ENCOUNTER — PATIENT MESSAGE (OUTPATIENT)
Dept: OTHER | Facility: OTHER | Age: 65
End: 2020-11-09

## 2020-11-09 RX ORDER — LOSARTAN POTASSIUM 100 MG/1
100 TABLET ORAL DAILY
Qty: 30 TABLET | Refills: 5 | Status: SHIPPED | OUTPATIENT
Start: 2020-11-09 | End: 2020-11-19

## 2020-11-09 NOTE — PROGRESS NOTES
Patient is starting 100mg daily today. She sent a [facility] message asking for the correct dosage. I noticed her last losartan prescription did not go through to [facility]. Will resend today.

## 2020-11-17 ENCOUNTER — PATIENT MESSAGE (OUTPATIENT)
Dept: OTHER | Facility: OTHER | Age: 65
End: 2020-11-17

## 2020-11-17 ENCOUNTER — PATIENT MESSAGE (OUTPATIENT)
Dept: CARDIOLOGY | Facility: CLINIC | Age: 65
End: 2020-11-17

## 2020-11-19 ENCOUNTER — OFFICE VISIT (OUTPATIENT)
Dept: CARDIOLOGY | Facility: CLINIC | Age: 65
End: 2020-11-19
Payer: COMMERCIAL

## 2020-11-19 ENCOUNTER — TELEPHONE (OUTPATIENT)
Dept: CARDIOLOGY | Facility: CLINIC | Age: 65
End: 2020-11-19

## 2020-11-19 ENCOUNTER — HOSPITAL ENCOUNTER (OUTPATIENT)
Dept: CARDIOLOGY | Facility: CLINIC | Age: 65
Discharge: HOME | End: 2020-11-19
Payer: COMMERCIAL

## 2020-11-19 VITALS
WEIGHT: 182.31 LBS | DIASTOLIC BLOOD PRESSURE: 74 MMHG | HEART RATE: 59 BPM | BODY MASS INDEX: 29.3 KG/M2 | HEIGHT: 66 IN | SYSTOLIC BLOOD PRESSURE: 159 MMHG

## 2020-11-19 DIAGNOSIS — R07.9 CHEST PAIN, UNSPECIFIED TYPE: ICD-10-CM

## 2020-11-19 DIAGNOSIS — R00.2 PALPITATION: ICD-10-CM

## 2020-11-19 DIAGNOSIS — R00.2 PALPITATION: Primary | ICD-10-CM

## 2020-11-19 DIAGNOSIS — I10 ESSENTIAL HYPERTENSION: ICD-10-CM

## 2020-11-19 DIAGNOSIS — Z87.74 HISTORY OF PERCUTANEOUS TRANSCATHETER CLOSURE OF CONGENITAL ASD: Primary | ICD-10-CM

## 2020-11-19 DIAGNOSIS — R42 LIGHTHEADEDNESS: ICD-10-CM

## 2020-11-19 DIAGNOSIS — I21.02 ST ELEVATION MYOCARDIAL INFARCTION INVOLVING LEFT ANTERIOR DESCENDING (LAD) CORONARY ARTERY: ICD-10-CM

## 2020-11-19 PROCEDURE — 1126F AMNT PAIN NOTED NONE PRSNT: CPT | Mod: S$GLB,,, | Performed by: INTERNAL MEDICINE

## 2020-11-19 PROCEDURE — 3078F DIAST BP <80 MM HG: CPT | Mod: CPTII,S$GLB,, | Performed by: INTERNAL MEDICINE

## 2020-11-19 PROCEDURE — 3077F SYST BP >= 140 MM HG: CPT | Mod: CPTII,S$GLB,, | Performed by: INTERNAL MEDICINE

## 2020-11-19 PROCEDURE — 3008F BODY MASS INDEX DOCD: CPT | Mod: CPTII,S$GLB,, | Performed by: INTERNAL MEDICINE

## 2020-11-19 PROCEDURE — 1126F PR PAIN SEVERITY QUANTIFIED, NO PAIN PRESENT: ICD-10-PCS | Mod: S$GLB,,, | Performed by: INTERNAL MEDICINE

## 2020-11-19 PROCEDURE — 99999 PR PBB SHADOW E&M-EST. PATIENT-LVL V: CPT | Mod: PBBFAC,,, | Performed by: INTERNAL MEDICINE

## 2020-11-19 PROCEDURE — 3078F PR MOST RECENT DIASTOLIC BLOOD PRESSURE < 80 MM HG: ICD-10-PCS | Mod: CPTII,S$GLB,, | Performed by: INTERNAL MEDICINE

## 2020-11-19 PROCEDURE — 3077F PR MOST RECENT SYSTOLIC BLOOD PRESSURE >= 140 MM HG: ICD-10-PCS | Mod: CPTII,S$GLB,, | Performed by: INTERNAL MEDICINE

## 2020-11-19 PROCEDURE — 93000 ELECTROCARDIOGRAM COMPLETE: CPT | Mod: S$GLB,,, | Performed by: INTERNAL MEDICINE

## 2020-11-19 PROCEDURE — 3008F PR BODY MASS INDEX (BMI) DOCUMENTED: ICD-10-PCS | Mod: CPTII,S$GLB,, | Performed by: INTERNAL MEDICINE

## 2020-11-19 PROCEDURE — 93000 EKG 12-LEAD: ICD-10-PCS | Mod: S$GLB,,, | Performed by: INTERNAL MEDICINE

## 2020-11-19 PROCEDURE — 99214 OFFICE O/P EST MOD 30 MIN: CPT | Mod: S$GLB,,, | Performed by: INTERNAL MEDICINE

## 2020-11-19 PROCEDURE — 99999 PR PBB SHADOW E&M-EST. PATIENT-LVL V: ICD-10-PCS | Mod: PBBFAC,,, | Performed by: INTERNAL MEDICINE

## 2020-11-19 PROCEDURE — 99214 PR OFFICE/OUTPT VISIT, EST, LEVL IV, 30-39 MIN: ICD-10-PCS | Mod: S$GLB,,, | Performed by: INTERNAL MEDICINE

## 2020-11-19 RX ORDER — AMLODIPINE BESYLATE 5 MG/1
5 TABLET ORAL DAILY
Qty: 30 TABLET | Refills: 11 | Status: SHIPPED | OUTPATIENT
Start: 2020-11-19 | End: 2021-02-02 | Stop reason: SDUPTHER

## 2020-11-19 NOTE — PROGRESS NOTES
Subjective:   Patient ID:  Socorro Amaro is a 65 y.o. female who presents for follow-up of History of percutaneous transcatheter closure of congenital  (Annual exam ), Chest Pain, Dizziness, Shortness of Breath, and Hypertension      HPI:  She reports that her blood pressure has been elevated since the summer.  It has been running in the 140s to 160 over 70s to 80s.  She has been suffering with chronic sinusitis as well as a tooth fracture.  She had recently been on clindamycin as well as Medrol Dosepak for 1 week.  She has been using nasal decongestant spray as well as over-the-counter NSAIDs.  She reports that she has been having intermittent sharp left-sided chest pains.  They are nonexertional and occur randomly.  They are not positional or worse with inspiration.  She also complains of feeling fatigued and wanting to fall asleep during the day.  Her  states that she does not snore stop breathing while she sleeps.  She is actively enrolled in the digital hypertension program, and her losartan had been increased from 50 mg to 100 mg for better blood pressure control.  She does report however that on the higher dose of losartan her dizziness got worse and so she subsequently dropped the dose back down to 50 mg with an improvement in her symptoms.  Her dizziness or lightheadedness is not positional.  She states it feels as if she is on a boat.  She is not having marshall vertigo.  She also reports having a chronic dry cough and is wondering if it is secondary to medication.  She has been having worsening GERD symptoms as well.    ECG:  Normal sinus rhythm 61 beats per minute    Echo 6/19:  Conclusion    · Normal left ventricular systolic function. The estimated ejection fraction is 60%  · Normal right ventricular systolic function.  · Normal LV diastolic function.  · Mild left atrial enlargement.  · Mild mitral regurgitation.  · The estimated PA systolic pressure is 24 mm Hg  · Normal central venous  pressure (3 mm Hg).  · There is an atrial septal closure device present with no evidence of residual shunting.         Past Medical History:   Diagnosis Date    ASD (atrial septal defect)     HTN (hypertension)     STEMI (ST elevation myocardial infarction) 06/30/2018    embolic event    Thyroid disease        Past Surgical History:   Procedure Laterality Date    ASD REPAIR  03/2019    percutanous ASD closure    CARDIAC CATHETERIZATION      Embolic occlusions of LAD, No other CAD    CORONARY ANGIOPLASTY  06/2018    LAD thrombectomy in [place]    HYSTERECTOMY      OOPHORECTOMY      PARTIAL HYSTERECTOMY  12/2016    RHINOPLASTY TIP      2006/2006    TONSILLECTOMY, ADENOIDECTOMY         Social History     Socioeconomic History    Marital status:      Spouse name: Not on file    Number of children: Not on file    Years of education: Not on file    Highest education level: Not on file   Occupational History    Not on file   Social Needs    Financial resource strain: Not hard at all    Food insecurity     Worry: Never true     Inability: Never true    Transportation needs     Medical: No     Non-medical: No   Tobacco Use    Smoking status: Never Smoker    Smokeless tobacco: Never Used   Substance and Sexual Activity    Alcohol use: Yes     Alcohol/week: 3.0 standard drinks     Types: 1 Glasses of wine, 1 Cans of beer, 1 Shots of liquor per week     Frequency: 2-3 times a week     Drinks per session: 1 or 2     Binge frequency: Never     Comment: 5x week    Drug use: Not on file    Sexual activity: Yes     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 ,   no kids    Lifestyle    Physical activity     Days per week: Not on file     Minutes per session: Not on file    Stress: Not on file   Relationships    Social connections     Talks on phone: More than three times a week     Gets together: Once a week     Attends Hinduism service: Never     Active member of club or organization: Yes      Attends meetings of clubs or organizations: More than 4 times per year     Relationship status:    Other Topics Concern    Not on file   Social History Narrative    Not on file       Family History   Problem Relation Age of Onset    Arthritis Mother     Cancer Mother 72        spindle cell ca  tongue     Hypertension Mother     Hyperlipidemia Mother     Depression Sister     Hypertension Brother     Depression Sister     Hypertension Father     Heart disease Father     Breast cancer Paternal Aunt        Patient's Medications   New Prescriptions    AMLODIPINE (NORVASC) 5 MG TABLET    Take 1 tablet (5 mg total) by mouth once daily.   Previous Medications    ALPRAZOLAM (XANAX) 0.25 MG TABLET    TAKE 1 TABLET BY MOUTH 1 hour before flight    ASPIRIN (ECOTRIN) 81 MG EC TABLET    Take 81 mg by mouth once daily.    CHOLECALCIFEROL, VITAMIN D3, (VITAMIN D3) 1,000 UNIT CAPSULE    Take 1,000 Units by mouth once daily.     CO-ENZYME Q-10 30 MG CAPSULE    Take 300 mg by mouth once daily.    CYANOCOBALAMIN (VITAMIN B-12) 1000 MCG TABLET    Take 5,000 mcg by mouth once daily.    LEVOTHYROXINE (SYNTHROID) 75 MCG TABLET    TAKE 1 TABLET BY MOUTH EVERY DAY BEFORE BREAKFAST    LORATADINE (CLARITIN) 10 MG TABLET    Allerclear 10 mg tablet   Take 1 tablet as needed by oral route.    LOTEPREDNOL (ALREX) 0.2 % DRPS    once daily.     FAUSTO, BULK, MISC    once daily.     METOPROLOL SUCCINATE (TOPROL-XL) 25 MG 24 HR TABLET    Take 1 tablet (25 mg total) by mouth once daily.    MULTIVITAMIN CAPSULE    Take 1 capsule by mouth once daily.    OLOPATADINE (PATANOL) 0.1 % OPHTHALMIC SOLUTION    Place 1 drop into both eyes 2 (two) times daily.    OMEPRAZOLE (PRILOSEC) 40 MG CAPSULE    omeprazole 40 mg capsule,delayed release   Take 1 capsule every day by oral route.    PHOSPHATIDYL SERINE, BULK, MISC    1 capsule by Misc.(Non-Drug; Combo Route) route once daily. 150 mg per capsule    PRASTERONE, DHEA, (DHEA ORAL)    Take by  "mouth once daily. 50 mg    RANITIDINE (ZANTAC) 300 MG TABLET    ranitidine 300 mg tablet as needed   Modified Medications    No medications on file   Discontinued Medications    LOSARTAN (COZAAR) 100 MG TABLET    Take 1 tablet (100 mg total) by mouth once daily.       Review of Systems   Constitution: Positive for weight gain. Negative for malaise/fatigue.   HENT: Negative for hearing loss.    Eyes: Negative for visual disturbance.   Cardiovascular: Positive for chest pain. Negative for claudication, dyspnea on exertion, leg swelling, near-syncope, orthopnea, palpitations, paroxysmal nocturnal dyspnea and syncope.   Respiratory: Positive for cough. Negative for shortness of breath, sleep disturbances due to breathing, snoring and wheezing.    Endocrine: Negative for cold intolerance, heat intolerance, polydipsia, polyphagia and polyuria.   Hematologic/Lymphatic: Negative for bleeding problem. Does not bruise/bleed easily.   Skin: Negative for rash and suspicious lesions.   Musculoskeletal: Negative for arthritis, falls, joint pain, muscle weakness and myalgias.   Gastrointestinal: Positive for heartburn. Negative for abdominal pain, change in bowel habit, constipation, diarrhea, hematochezia, melena and nausea.   Genitourinary: Negative for hematuria and nocturia.   Neurological: Positive for light-headedness. Negative for excessive daytime sleepiness, dizziness, headaches, loss of balance and weakness.   Psychiatric/Behavioral: Negative for depression. The patient is not nervous/anxious.    Allergic/Immunologic: Negative for environmental allergies.       BP (!) 159/74 (BP Location: Left arm, Patient Position: Sitting, BP Method: Medium (Automatic))   Pulse (!) 59   Ht 5' 6" (1.676 m)   Wt 82.7 kg (182 lb 5.1 oz)   BMI 29.43 kg/m²     Objective:   Physical Exam   Constitutional: She is oriented to person, place, and time. She appears well-developed and well-nourished.        HENT:   Head: Normocephalic and " atraumatic.   Mouth/Throat: Oropharynx is clear and moist.   Eyes: Pupils are equal, round, and reactive to light. Conjunctivae and EOM are normal. No scleral icterus.   Neck: Normal range of motion. Neck supple. No hepatojugular reflux and no JVD present. No tracheal deviation present. No thyromegaly present.   Cardiovascular: Normal rate, regular rhythm, normal heart sounds and intact distal pulses. PMI is not displaced.   Pulses:       Carotid pulses are 2+ on the right side and 2+ on the left side.       Radial pulses are 2+ on the right side and 2+ on the left side.        Dorsalis pedis pulses are 2+ on the right side and 2+ on the left side.        Posterior tibial pulses are 2+ on the right side and 2+ on the left side.   Pulmonary/Chest: Effort normal and breath sounds normal.   Abdominal: Soft. Bowel sounds are normal. She exhibits no distension and no mass. There is no hepatosplenomegaly. There is no abdominal tenderness.   Musculoskeletal:         General: No tenderness or edema.   Lymphadenopathy:     She has no cervical adenopathy.   Neurological: She is alert and oriented to person, place, and time.   Skin: Skin is warm and dry. No rash noted. No cyanosis or erythema. Nails show no clubbing.   Psychiatric: She has a normal mood and affect. Her speech is normal and behavior is normal.       Lab Results   Component Value Date     06/12/2020    K 4.4 06/12/2020     06/12/2020    CO2 23 06/12/2020    BUN 12 06/12/2020    CREATININE 0.8 06/12/2020    GLU 87 06/12/2020    MG 2.0 10/22/2018    AST 18 06/12/2020    ALT 16 06/12/2020    ALBUMIN 4.4 06/12/2020    PROT 6.9 06/12/2020    BILITOT 0.5 06/12/2020    WBC 3.36 (L) 03/07/2019    HGB 13.3 03/07/2019    HCT 40.8 03/07/2019    MCV 89 03/07/2019     03/07/2019    INR 1.1 03/07/2019    TSH 0.371 (L) 06/12/2020    CHOL 179 06/12/2020    HDL 84 (H) 06/12/2020    LDLCALC 86.2 06/12/2020    TRIG 44 06/12/2020       Assessment:     1. History  of percutaneous transcatheter closure of congenital ASD :  Continue aspirin.  Follow-up echo ordered.   2. Essential hypertension :  She is actively enrolled in the digital hypertension program.  Her blood pressure has not been at goal.  I am replacing her losartan with amlodipine 5 mg daily.  We will see if this resolves her cough.  We also discussed stopping any over-the-counter NSAIDs and decongestant use.Limit sodium intake to < 1500mg daily and follow the DASH diet plan.  Encouraged her to continue her daily walking.   3. ST elevation myocardial infarction involving left anterior descending (LAD) coronary artery :  Embolic in origin.  Continue metoprolol.   4. Lightheadedness :  I do not think this has a cardiac etiology.  It may be related to her ongoing sinus issues.  Recommend follow-up with ENT if her symptoms do not resolve.   5. Chest pain, unspecified type :  Her symptoms do not sound ischemic in nature.  I have ordered an echo to evaluate her septal occluder.       Plan:     Socorro was seen today for history of percutaneous transcatheter closure of congenital , chest pain, dizziness, shortness of breath and hypertension.    Diagnoses and all orders for this visit:    History of percutaneous transcatheter closure of congenital ASD  -     CBC Auto Differential; Future  -     amLODIPine (NORVASC) 5 MG tablet; Take 1 tablet (5 mg total) by mouth once daily.  -     Echo Color Flow Doppler? Yes    Essential hypertension  -     CBC Auto Differential; Future  -     amLODIPine (NORVASC) 5 MG tablet; Take 1 tablet (5 mg total) by mouth once daily.  -     Echo Color Flow Doppler? Yes    ST elevation myocardial infarction involving left anterior descending (LAD) coronary artery    Lightheadedness    Chest pain, unspecified type  -     CBC Auto Differential; Future  -     amLODIPine (NORVASC) 5 MG tablet; Take 1 tablet (5 mg total) by mouth once daily.  -     Echo Color Flow Doppler? Yes        Thank you for  allowing me to participate in this patient's care. Please do not hesitate to contact me with any questions or concerns.

## 2020-11-23 ENCOUNTER — PATIENT OUTREACH (OUTPATIENT)
Dept: OTHER | Facility: OTHER | Age: 65
End: 2020-11-23

## 2020-11-23 NOTE — PROGRESS NOTES
NELSONFCB to discuss if her cough has improved off the ARB and discuss how she is tolerating amlodipine. These adjustments were done by Dr. Patton on 11/19

## 2020-12-07 NOTE — PROGRESS NOTES
Digital Medicine: Clinician Follow-Up    Called patient to discuss how she is tolerating amlodipine and assess if her cough has improved off losartan.    The history is provided by the patient.   Follow-up reason(s): medication change follow-up.     Hypertension    Readings are trending down due to medication adherence.       Patient is not experiencing signs/symptoms of hypotension.  Patient is not experiencing signs/symptoms of hypertension.      Additional Follow-up details: Patient's cough has stopped since she stopped losartan. She is tolerating amlodipine without edema. She was also having chest pain that has improved since stopping losartan.     Patient did make medication change.    Is patient tolerating med change? yes            Last 5 Patient Entered Readings                                      Current 30 Day Average: 142/82     Recent Readings 12/5/2020 12/3/2020 12/2/2020 12/1/2020 12/1/2020    SBP (mmHg) 138 132 136 138 136    DBP (mmHg) 81 82 76 88 67    Pulse 60 64 81 92 80                 Depression Screening  Did not address depression screening.    Sleep Apnea Screening    Did not address sleep apnea screening.     Medication Affordability Screening  Did not address medication affordability screening.     Medication Adherence-Medication adherence was assessed.          ASSESSMENT(S)  Patients BP average is 142/82 mmHg, which is above goal. Patient's BP goal is less than or equal to 130/80.     BP is trending down with two weeks of amlodipine.       Hypertension Plan  Additional monitoring needed.  Continue current therapy.  Patient plans to message Dr. Patton to let her know the good results she's experienced since stopping losartan and reassess if she needs an ECHO.     Addressed patient questions and patient has my contact information if needed prior to next outreach. Patient verbalizes understanding.             There are no preventive care reminders to display for this patient.  There are no  preventive care reminders to display for this patient.      Hypertension Medications             amLODIPine (NORVASC) 5 MG tablet Take 1 tablet (5 mg total) by mouth once daily.    metoprolol succinate (TOPROL-XL) 25 MG 24 hr tablet Take 1 tablet (25 mg total) by mouth once daily.

## 2020-12-17 ENCOUNTER — OFFICE VISIT (OUTPATIENT)
Dept: UROLOGY | Facility: CLINIC | Age: 65
End: 2020-12-17
Attending: INTERNAL MEDICINE
Payer: COMMERCIAL

## 2020-12-17 VITALS — HEART RATE: 73 BPM | SYSTOLIC BLOOD PRESSURE: 144 MMHG | DIASTOLIC BLOOD PRESSURE: 93 MMHG

## 2020-12-17 DIAGNOSIS — R31.29 MICROSCOPIC HEMATURIA: ICD-10-CM

## 2020-12-17 LAB
BACTERIA #/AREA URNS AUTO: ABNORMAL /HPF
MICROSCOPIC COMMENT: ABNORMAL
RBC #/AREA URNS AUTO: 7 /HPF (ref 0–4)
SQUAMOUS #/AREA URNS AUTO: 0 /HPF
WBC #/AREA URNS AUTO: 0 /HPF (ref 0–5)

## 2020-12-17 PROCEDURE — 1125F AMNT PAIN NOTED PAIN PRSNT: CPT | Mod: S$GLB,,, | Performed by: NURSE PRACTITIONER

## 2020-12-17 PROCEDURE — 1125F PR PAIN SEVERITY QUANTIFIED, PAIN PRESENT: ICD-10-PCS | Mod: S$GLB,,, | Performed by: NURSE PRACTITIONER

## 2020-12-17 PROCEDURE — 81001 URINALYSIS AUTO W/SCOPE: CPT

## 2020-12-17 PROCEDURE — 99204 OFFICE O/P NEW MOD 45 MIN: CPT | Mod: 25,S$GLB,, | Performed by: NURSE PRACTITIONER

## 2020-12-17 PROCEDURE — 51701 PR INSERTION OF NON-INDWELLING BLADDER CATHETERIZATION FOR RESIDUAL UR: ICD-10-PCS | Mod: S$GLB,,, | Performed by: NURSE PRACTITIONER

## 2020-12-17 PROCEDURE — 51701 INSERT BLADDER CATHETER: CPT | Mod: S$GLB,,, | Performed by: NURSE PRACTITIONER

## 2020-12-17 PROCEDURE — 3080F DIAST BP >= 90 MM HG: CPT | Mod: CPTII,S$GLB,, | Performed by: NURSE PRACTITIONER

## 2020-12-17 PROCEDURE — 3080F PR MOST RECENT DIASTOLIC BLOOD PRESSURE >= 90 MM HG: ICD-10-PCS | Mod: CPTII,S$GLB,, | Performed by: NURSE PRACTITIONER

## 2020-12-17 PROCEDURE — 3077F PR MOST RECENT SYSTOLIC BLOOD PRESSURE >= 140 MM HG: ICD-10-PCS | Mod: CPTII,S$GLB,, | Performed by: NURSE PRACTITIONER

## 2020-12-17 PROCEDURE — 99204 PR OFFICE/OUTPT VISIT, NEW, LEVL IV, 45-59 MIN: ICD-10-PCS | Mod: 25,S$GLB,, | Performed by: NURSE PRACTITIONER

## 2020-12-17 PROCEDURE — 87086 URINE CULTURE/COLONY COUNT: CPT

## 2020-12-17 PROCEDURE — 3077F SYST BP >= 140 MM HG: CPT | Mod: CPTII,S$GLB,, | Performed by: NURSE PRACTITIONER

## 2020-12-17 NOTE — LETTER
December 17, 2020      Joy Rice MD  [address]  [address]  [facility] [address]           [facility]  [address]  [facility] [address]  Phone: [phone]  Fax: [phone]          Patient: Socorro Amaro   MR Number: [id number]   YOB: 1955   Date of Visit: 12/17/2020       Dear Dr. Joy Rice:    Thank you for referring Socorro Amaro to me for evaluation. Attached you will find relevant portions of my assessment and plan of care.    If you have questions, please do not hesitate to call me. I look forward to following Socorro Amaro along with you.    Sincerely,    Talya Olivarez, NP    Enclosure  CC:  No Recipients    If you would like to receive this communication electronically, please contact [email or web address] or [phone] to request more information on [employer] access.    For providers and/or their staff who would like to refer a patient to [facility], please contact us through our one-stop-shop provider referral line, [facility], at [phone].    If you feel you have received this communication in error or would no longer like to receive these types of communications, please e-mail [email or web address]

## 2020-12-17 NOTE — PROGRESS NOTES
Subjective:      Socorro Amaro is a 65 y.o. female who was referred by Dr. Rice for evaluation of her microscopic hematuria.      Hematuria  Patient complains of microscopic hematuria. Onset of hematuria was several weeks ago and was unknown in onset. There is not a history of nephrolithiasis. There is not a history of urologic trauma. Other urologic symptoms include nocturia x 2. Patient admits to history of no risk factors for cancer. Patient denies history of Agent Orange exposure, chronic Ramirez catheter,  surgeries, occupational exposure, sexually transmitted diseases, tobacco use, trauma and urolithiasis. Prior workup has been UA'S.    UA scanned in media showed 40-60 RBCs with trace leukocytes.     Component      Latest Ref Rng & Units 12/14/2020   RBC, UA      0 - 4 /hpf 4   WBC, UA      0 - 5 /hpf 2   Bacteria, UA      None-Occ /hpf Occasional   Squam Epithel, UA      /hpf 10   Microscopic Comment       SEE COMMENT     Component      Latest Ref Rng & Units 12/14/2020   Urine Culture, Routine       No significant growth     Denies dysuria, frequency and urgency. Denies gross hematuria, flank pain and fever/chills. Denies a history of recurrent UTIs and nephrolithiasis. She does report nocturia 1-2x/night but does not limit PM fluids. She is a non smoker. Takes ASA 81 mg daily.     Additionally she reports LLQ and left inguinal pain that began several weeks ago after doing some strenuous yard work. She has not taken any medications to alleviate this. Denies constipation and changes in bowels. Denies flank pain.     The following portions of the patient's history were reviewed and updated as appropriate: allergies, current medications, past family history, past medical history, past social history, past surgical history and problem list.    Review of Systems  Constitutional: no fever or chills  ENT: no nasal congestion or sore throat  Respiratory: no cough or shortness of breath  Cardiovascular: no  chest pain or palpitations  Gastrointestinal: no nausea or vomiting, tolerating diet  Genitourinary: as per HPI  Hematologic/Lymphatic: no easy bruising or lymphadenopathy  Musculoskeletal: no arthralgias or myalgias  Neurological: no seizures or tremors  Behavioral/Psych: no auditory or visual hallucinations     Objective:   Vital Signs   Vitals:    12/17/20 0704   BP: (!) 144/93   Pulse: 73       Physical Exam   General: alert and oriented, no acute distress  Head: normocephalic, atraumatic  Neck: supple, no lymphadenopathy, normal ROM, no masses  Respiratory: Symmetric expansion, non-labored breathing  Cardiovascular: regular rate and rhythm, nomal pulses, no peripheral edema  Abdomen: soft, non tender, non distended, no palpable masses, no hernias, no hepatomegaly or splenomegaly  Pelvic: external genitalia normal, urethra without abnormality or discharge, vagina normal without discharge, no cystocele  Lymphatic: no inguinal nodes  Skin: normal coloration and turgor, no rashes, no suspicious skin lesions noted  Neuro: alert and oriented x3, no gross deficits  Psych: normal judgment and insight, normal mood/affect and non-anxious  No CVA tenderness    Lab Review   Urinalysis demonstrates : no specimen  Lab Results   Component Value Date    WBC 3.36 (L) 03/07/2019    HGB 13.3 03/07/2019    HCT 40.8 03/07/2019    MCV 89 03/07/2019     03/07/2019     Lab Results   Component Value Date    CREATININE 0.8 06/12/2020    BUN 12 06/12/2020       Imaging   None    Assessment:   Microscopic hematuria     Plan:   Diagnoses and all orders for this visit:    Microscopic hematuria  -     Ambulatory referral/consult to Urology  -     Urinalysis Microscopic  -     Urine culture    Plan:  --All previous specimens appear contaminated despite clean catch technique   --Catheterized urine specimen today   --Discussed the new AUA guidelines for microhematuria   --If >3 RBCs/HPF without squamous cells will proceed with workup to  include GLORIA (low risk- non smoker) and cystoscope  --Inguinal pain does not appear to be urologic- recommend trial of ibuprofen   --Will call with results!

## 2020-12-18 ENCOUNTER — PATIENT MESSAGE (OUTPATIENT)
Dept: UROLOGY | Facility: CLINIC | Age: 65
End: 2020-12-18

## 2020-12-18 ENCOUNTER — PATIENT MESSAGE (OUTPATIENT)
Dept: ADMINISTRATIVE | Facility: OTHER | Age: 65
End: 2020-12-18

## 2020-12-18 DIAGNOSIS — R31.29 MICROSCOPIC HEMATURIA: Primary | ICD-10-CM

## 2020-12-18 LAB — BACTERIA UR CULT: NO GROWTH

## 2020-12-18 NOTE — PROGRESS NOTES
I have spoke with patient in regards to her microscopic urinalysis results.  She is aware that urine culture has not resulted yet and that hematuria may be related to infectious process.  She would like to have ultrasound and cystoscope scheduled in the likelihood that urine culture is negative. I have placed orders for retroperitoneal ultrasound and cystoscope to be performed at [facility] office.  Can you please reach out to her, schedule US and next available cystoscope with either provider.   Thanks,   Ofelia

## 2020-12-22 ENCOUNTER — PATIENT MESSAGE (OUTPATIENT)
Dept: ADMINISTRATIVE | Facility: OTHER | Age: 65
End: 2020-12-22

## 2021-01-07 ENCOUNTER — PROCEDURE VISIT (OUTPATIENT)
Dept: UROLOGY | Facility: CLINIC | Age: 66
End: 2021-01-07
Attending: UROLOGY
Payer: COMMERCIAL

## 2021-01-07 ENCOUNTER — HOSPITAL ENCOUNTER (OUTPATIENT)
Dept: RADIOLOGY | Facility: OTHER | Age: 66
Discharge: HOME | End: 2021-01-07
Attending: NURSE PRACTITIONER
Payer: COMMERCIAL

## 2021-01-07 ENCOUNTER — HOSPITAL ENCOUNTER (OUTPATIENT)
Dept: RADIOLOGY | Facility: OTHER | Age: 66
Discharge: HOME | End: 2021-01-07
Attending: INTERNAL MEDICINE
Payer: COMMERCIAL

## 2021-01-07 VITALS
BODY MASS INDEX: 29.57 KG/M2 | WEIGHT: 184 LBS | DIASTOLIC BLOOD PRESSURE: 80 MMHG | SYSTOLIC BLOOD PRESSURE: 126 MMHG | HEIGHT: 66 IN | HEART RATE: 67 BPM

## 2021-01-07 DIAGNOSIS — R31.29 MICROSCOPIC HEMATURIA: ICD-10-CM

## 2021-01-07 DIAGNOSIS — Z12.39 ENCOUNTER FOR SCREENING FOR MALIGNANT NEOPLASM OF BREAST, UNSPECIFIED SCREENING MODALITY: ICD-10-CM

## 2021-01-07 DIAGNOSIS — Z12.31 BREAST CANCER SCREENING BY MAMMOGRAM: ICD-10-CM

## 2021-01-07 LAB
BILIRUB SERPL-MCNC: ABNORMAL MG/DL
BLOOD URINE, POC: 50
CLARITY, POC UA: CLEAR
COLOR, POC UA: YELLOW
GLUCOSE UR QL STRIP: NORMAL
KETONES UR QL STRIP: ABNORMAL
LEUKOCYTE ESTERASE URINE, POC: ABNORMAL
NITRITE, POC UA: ABNORMAL
PH, POC UA: 6
PROTEIN, POC: ABNORMAL
SPECIFIC GRAVITY, POC UA: 1
UROBILINOGEN, POC UA: NORMAL

## 2021-01-07 PROCEDURE — 76770 US EXAM ABDO BACK WALL COMP: CPT | Mod: TC

## 2021-01-07 PROCEDURE — 77067 SCR MAMMO BI INCL CAD: CPT | Mod: 26,,, | Performed by: RADIOLOGY

## 2021-01-07 PROCEDURE — 77063 BREAST TOMOSYNTHESIS BI: CPT | Mod: 26,,, | Performed by: RADIOLOGY

## 2021-01-07 PROCEDURE — 52000 CYSTOURETHROSCOPY: CPT | Mod: S$GLB,,, | Performed by: UROLOGY

## 2021-01-07 PROCEDURE — 76770 US RETROPERITONEAL COMPLETE: ICD-10-PCS | Mod: 26,,, | Performed by: RADIOLOGY

## 2021-01-07 PROCEDURE — 52000 CYSTOSCOPY: ICD-10-PCS | Mod: S$GLB,,, | Performed by: UROLOGY

## 2021-01-07 PROCEDURE — 81002 URINALYSIS NONAUTO W/O SCOPE: CPT | Mod: S$GLB,,, | Performed by: UROLOGY

## 2021-01-07 PROCEDURE — 77067 SCR MAMMO BI INCL CAD: CPT | Mod: TC

## 2021-01-07 PROCEDURE — 77063 MAMMO DIGITAL SCREENING BILAT WITH TOMO: ICD-10-PCS | Mod: 26,,, | Performed by: RADIOLOGY

## 2021-01-07 PROCEDURE — 77067 MAMMO DIGITAL SCREENING BILAT WITH TOMO: ICD-10-PCS | Mod: 26,,, | Performed by: RADIOLOGY

## 2021-01-07 PROCEDURE — 76770 US EXAM ABDO BACK WALL COMP: CPT | Mod: 26,,, | Performed by: RADIOLOGY

## 2021-01-07 PROCEDURE — 81002 POCT URINE DIPSTICK WITHOUT MICROSCOPE: ICD-10-PCS | Mod: S$GLB,,, | Performed by: UROLOGY

## 2021-01-07 RX ORDER — CEPHALEXIN 500 MG/1
500 CAPSULE ORAL
Status: SHIPPED | OUTPATIENT
Start: 2021-01-07

## 2021-01-07 RX ORDER — LIDOCAINE HYDROCHLORIDE 20 MG/ML
JELLY TOPICAL
Status: SHIPPED | OUTPATIENT
Start: 2021-01-07

## 2021-01-20 ENCOUNTER — OFFICE VISIT (OUTPATIENT)
Dept: OBSTETRICS AND GYNECOLOGY | Facility: CLINIC | Age: 66
End: 2021-01-20
Attending: OBSTETRICS & GYNECOLOGY
Payer: COMMERCIAL

## 2021-01-20 VITALS
DIASTOLIC BLOOD PRESSURE: 70 MMHG | SYSTOLIC BLOOD PRESSURE: 130 MMHG | HEIGHT: 66 IN | WEIGHT: 183 LBS | BODY MASS INDEX: 29.41 KG/M2

## 2021-01-20 DIAGNOSIS — Z01.419 WELL FEMALE EXAM WITH ROUTINE GYNECOLOGICAL EXAM: Primary | ICD-10-CM

## 2021-01-20 PROCEDURE — 3075F SYST BP GE 130 - 139MM HG: CPT | Mod: CPTII,S$GLB,, | Performed by: OBSTETRICS & GYNECOLOGY

## 2021-01-20 PROCEDURE — 3008F PR BODY MASS INDEX (BMI) DOCUMENTED: ICD-10-PCS | Mod: CPTII,S$GLB,, | Performed by: OBSTETRICS & GYNECOLOGY

## 2021-01-20 PROCEDURE — 1126F PR PAIN SEVERITY QUANTIFIED, NO PAIN PRESENT: ICD-10-PCS | Mod: S$GLB,,, | Performed by: OBSTETRICS & GYNECOLOGY

## 2021-01-20 PROCEDURE — 99396 PR PREVENTIVE VISIT,EST,40-64: ICD-10-PCS | Mod: S$GLB,,, | Performed by: OBSTETRICS & GYNECOLOGY

## 2021-01-20 PROCEDURE — 3078F PR MOST RECENT DIASTOLIC BLOOD PRESSURE < 80 MM HG: ICD-10-PCS | Mod: CPTII,S$GLB,, | Performed by: OBSTETRICS & GYNECOLOGY

## 2021-01-20 PROCEDURE — 3008F BODY MASS INDEX DOCD: CPT | Mod: CPTII,S$GLB,, | Performed by: OBSTETRICS & GYNECOLOGY

## 2021-01-20 PROCEDURE — 88175 CYTOPATH C/V AUTO FLUID REDO: CPT

## 2021-01-20 PROCEDURE — 99396 PREV VISIT EST AGE 40-64: CPT | Mod: S$GLB,,, | Performed by: OBSTETRICS & GYNECOLOGY

## 2021-01-20 PROCEDURE — 99999 PR PBB SHADOW E&M-EST. PATIENT-LVL IV: CPT | Mod: PBBFAC,,, | Performed by: OBSTETRICS & GYNECOLOGY

## 2021-01-20 PROCEDURE — 3078F DIAST BP <80 MM HG: CPT | Mod: CPTII,S$GLB,, | Performed by: OBSTETRICS & GYNECOLOGY

## 2021-01-20 PROCEDURE — 3075F PR MOST RECENT SYSTOLIC BLOOD PRESS GE 130-139MM HG: ICD-10-PCS | Mod: CPTII,S$GLB,, | Performed by: OBSTETRICS & GYNECOLOGY

## 2021-01-20 PROCEDURE — 99999 PR PBB SHADOW E&M-EST. PATIENT-LVL IV: ICD-10-PCS | Mod: PBBFAC,,, | Performed by: OBSTETRICS & GYNECOLOGY

## 2021-01-20 PROCEDURE — 1126F AMNT PAIN NOTED NONE PRSNT: CPT | Mod: S$GLB,,, | Performed by: OBSTETRICS & GYNECOLOGY

## 2021-02-02 ENCOUNTER — PATIENT MESSAGE (OUTPATIENT)
Dept: CARDIOLOGY | Facility: CLINIC | Age: 66
End: 2021-02-02

## 2021-02-02 DIAGNOSIS — Z87.74 HISTORY OF PERCUTANEOUS TRANSCATHETER CLOSURE OF CONGENITAL ASD: ICD-10-CM

## 2021-02-02 DIAGNOSIS — I10 ESSENTIAL HYPERTENSION: ICD-10-CM

## 2021-02-02 DIAGNOSIS — R07.9 CHEST PAIN, UNSPECIFIED TYPE: ICD-10-CM

## 2021-02-02 RX ORDER — AMLODIPINE BESYLATE 5 MG/1
5 TABLET ORAL DAILY
Qty: 90 TABLET | Refills: 3 | Status: SHIPPED | OUTPATIENT
Start: 2021-02-02 | End: 2021-11-02 | Stop reason: SDUPTHER

## 2021-02-02 RX ORDER — AMLODIPINE BESYLATE 5 MG/1
5 TABLET ORAL DAILY
Qty: 90 TABLET | Refills: 3 | Status: SHIPPED | OUTPATIENT
Start: 2021-02-02 | End: 2021-02-02 | Stop reason: SDUPTHER

## 2021-02-08 LAB
FINAL PATHOLOGIC DIAGNOSIS: NORMAL
Lab: NORMAL

## 2021-02-12 ENCOUNTER — PATIENT MESSAGE (OUTPATIENT)
Dept: OBSTETRICS AND GYNECOLOGY | Facility: CLINIC | Age: 66
End: 2021-02-12

## 2021-05-11 ENCOUNTER — LAB VISIT (OUTPATIENT)
Dept: LAB | Facility: HOSPITAL | Age: 66
End: 2021-05-11
Attending: INTERNAL MEDICINE
Payer: COMMERCIAL

## 2021-05-11 DIAGNOSIS — R79.89 ELEVATED LFTS: ICD-10-CM

## 2021-05-11 DIAGNOSIS — E03.4 HYPOTHYROIDISM DUE TO ACQUIRED ATROPHY OF THYROID: ICD-10-CM

## 2021-05-11 DIAGNOSIS — Z78.0 POSTMENOPAUSAL: ICD-10-CM

## 2021-05-11 LAB — VIT B12 SERPL-MCNC: 707 PG/ML (ref 210–950)

## 2021-05-11 PROCEDURE — 80076 HEPATIC FUNCTION PANEL: CPT | Performed by: INTERNAL MEDICINE

## 2021-05-11 PROCEDURE — 80048 BASIC METABOLIC PNL TOTAL CA: CPT | Performed by: INTERNAL MEDICINE

## 2021-05-11 PROCEDURE — 84439 ASSAY OF FREE THYROXINE: CPT | Performed by: INTERNAL MEDICINE

## 2021-05-11 PROCEDURE — 84443 ASSAY THYROID STIM HORMONE: CPT | Performed by: INTERNAL MEDICINE

## 2021-05-11 PROCEDURE — 82607 VITAMIN B-12: CPT | Performed by: INTERNAL MEDICINE

## 2021-05-12 LAB
ALBUMIN SERPL BCP-MCNC: 4.3 G/DL (ref 3.5–5.2)
ALP SERPL-CCNC: 75 U/L (ref 55–135)
ALT SERPL W/O P-5'-P-CCNC: 19 U/L (ref 10–44)
ANION GAP SERPL CALC-SCNC: 10 MMOL/L (ref 8–16)
AST SERPL-CCNC: 22 U/L (ref 10–40)
BILIRUB DIRECT SERPL-MCNC: 0.2 MG/DL (ref 0.1–0.3)
BILIRUB SERPL-MCNC: 0.6 MG/DL (ref 0.1–1)
BUN SERPL-MCNC: 19 MG/DL (ref 8–23)
CALCIUM SERPL-MCNC: 10.2 MG/DL (ref 8.7–10.5)
CHLORIDE SERPL-SCNC: 106 MMOL/L (ref 95–110)
CO2 SERPL-SCNC: 25 MMOL/L (ref 23–29)
CREAT SERPL-MCNC: 0.8 MG/DL (ref 0.5–1.4)
EST. GFR  (AFRICAN AMERICAN): >60 ML/MIN/1.73 M^2
EST. GFR  (NON AFRICAN AMERICAN): >60 ML/MIN/1.73 M^2
GLUCOSE SERPL-MCNC: 83 MG/DL (ref 70–110)
POTASSIUM SERPL-SCNC: 4.4 MMOL/L (ref 3.5–5.1)
PROT SERPL-MCNC: 7 G/DL (ref 6–8.4)
SODIUM SERPL-SCNC: 141 MMOL/L (ref 136–145)
T4 FREE SERPL-MCNC: 1.04 NG/DL (ref 0.71–1.51)
TSH SERPL DL<=0.005 MIU/L-ACNC: 1.54 UIU/ML (ref 0.4–4)

## 2021-06-19 ENCOUNTER — PATIENT MESSAGE (OUTPATIENT)
Dept: ADMINISTRATIVE | Facility: OTHER | Age: 66
End: 2021-06-19

## 2021-08-23 ENCOUNTER — APPOINTMENT (RX ONLY)
Dept: URBAN - NONMETROPOLITAN AREA CLINIC 25 | Facility: CLINIC | Age: 66
Setting detail: DERMATOLOGY
End: 2021-08-23

## 2021-08-23 DIAGNOSIS — D49.2 NEOPLASM OF UNSPECIFIED BEHAVIOR OF BONE, SOFT TISSUE, AND SKIN: ICD-10-CM

## 2021-08-23 DIAGNOSIS — L57.0 ACTINIC KERATOSIS: ICD-10-CM

## 2021-08-23 DIAGNOSIS — D22 MELANOCYTIC NEVI: ICD-10-CM

## 2021-08-23 PROBLEM — D22.4 MELANOCYTIC NEVI OF SCALP AND NECK: Status: ACTIVE | Noted: 2021-08-23

## 2021-08-23 PROCEDURE — ? OBSERVATION

## 2021-08-23 PROCEDURE — ? COUNSELING

## 2021-08-23 PROCEDURE — ? BIOPSY BY SHAVE METHOD

## 2021-08-23 PROCEDURE — 99202 OFFICE O/P NEW SF 15 MIN: CPT | Mod: 25

## 2021-08-23 PROCEDURE — ? LIQUID NITROGEN

## 2021-08-23 PROCEDURE — 40490 BIOPSY OF LIP: CPT | Mod: 59

## 2021-08-23 PROCEDURE — 17003 DESTRUCT PREMALG LES 2-14: CPT

## 2021-08-23 PROCEDURE — 17000 DESTRUCT PREMALG LESION: CPT

## 2021-08-23 ASSESSMENT — LOCATION SIMPLE DESCRIPTION DERM
LOCATION SIMPLE: RIGHT INFERIOR LIP
LOCATION SIMPLE: LEFT HAND
LOCATION SIMPLE: POSTERIOR NECK
LOCATION SIMPLE: LEFT CHEEK

## 2021-08-23 ASSESSMENT — LOCATION ZONE DERM
LOCATION ZONE: FACE
LOCATION ZONE: HAND
LOCATION ZONE: LIP
LOCATION ZONE: NECK

## 2021-08-23 ASSESSMENT — LOCATION DETAILED DESCRIPTION DERM
LOCATION DETAILED: LEFT SUPERIOR CENTRAL MALAR CHEEK
LOCATION DETAILED: RIGHT INFERIOR VERMILION LIP
LOCATION DETAILED: LEFT RADIAL DORSAL HAND
LOCATION DETAILED: LEFT POSTERIOR NECK

## 2021-08-23 NOTE — HPI: SKIN LESION
How Severe Is Your Skin Lesion?: moderate
Has Your Skin Lesion Been Treated?: not been treated
Is This A New Presentation, Or A Follow-Up?: Skin Lesion
What Type Of Note Output Would You Prefer (Optional)?: Standard Output
Is This A New Presentation, Or A Follow-Up?: Skin Lesions
Additional History: Was seen at [facility] for FSE. Was recommended by provider to have these spots further evaluated.

## 2021-08-23 NOTE — PROCEDURE: LIQUID NITROGEN
Consent: The patient's consent was obtained including but not limited to risks of crusting, scabbing, blistering, scarring, darker or lighter pigmentary change, recurrence, incomplete removal and infection.
Detail Level: Detailed
Show Aperture Variable?: Yes
Duration Of Freeze Thaw-Cycle (Seconds): 3
Post-Care Instructions: I reviewed with the patient in detail post-care instructions. Patient is to wear sunprotection, and avoid picking at any of the treated lesions. Pt may apply Vaseline to crusted or scabbing areas.
Number Of Freeze-Thaw Cycles: 1 freeze-thaw cycle
Render Note In Bullet Format When Appropriate: No

## 2021-08-23 NOTE — PROCEDURE: MIPS QUALITY
Detail Level: Detailed
Quality 130: Documentation Of Current Medications In The Medical Record: Current Medications Documented
Quality 226: Preventive Care And Screening: Tobacco Use: Screening And Cessation Intervention: Patient screened for tobacco use and is an ex/non-smoker
Quality 137: Melanoma: Continuity Of Care - Recall System: Recall system not utilized, reason not otherwise specified
Quality 337: Tuberculosis Prevention For Psoriasis And Psoriatic Arthritis Patients On A Biological Immune Response Modifier: No documentation of negative or managed positive TB screen
Quality 265: Biopsy Follow-Up: Biopsy results reviewed, communicated, tracked, and documented
Quality 431: Preventive Care And Screening: Unhealthy Alcohol Use - Screening: Patient screened for unhealthy alcohol use using a single question and scores less than 2 times per year

## 2021-08-23 NOTE — PROCEDURE: BIOPSY BY SHAVE METHOD
Notification Instructions: Patient will be notified of biopsy results. However, patient instructed to call the office if not contacted within 2 weeks.
X Size Of Lesion In Cm: 0
Depth Of Biopsy: dermis
Electrodesiccation And Curettage Text: The wound bed was treated with electrodesiccation and curettage after the biopsy was performed.
Anesthesia Volume In Cc: 1
Dressing: Band-Aid
Validate That Anesthesia Is Not 0: No
Post-Care Instructions: I reviewed with the patient in detail post-care instructions. Patient is to keep the biopsy site dry overnight, and then apply bacitracin twice daily until healed. Patient may apply hydrogen peroxide soaks to remove any crusting.
Curettage Text: The wound bed was treated with curettage after the biopsy was performed.
Was A Bandage Applied: Yes
Type Of Destruction Used: Curettage
Information: Selecting Yes will display possible errors in your note based on the variables you have selected. This validation is only offered as a suggestion for you. PLEASE NOTE THAT THE VALIDATION TEXT WILL BE REMOVED WHEN YOU FINALIZE YOUR NOTE. IF YOU WANT TO FAX A PRELIMINARY NOTE YOU WILL NEED TO TOGGLE THIS TO 'NO' IF YOU DO NOT WANT IT IN YOUR FAXED NOTE.
Electrodesiccation Text: The wound bed was treated with electrodesiccation after the biopsy was performed.
Consent: Verbal consent was obtained and risks were reviewed including but not limited to scarring, infection, bleeding, scabbing, incomplete removal, nerve damage and allergy to anesthesia.
Biopsy Type: H and E
Hemostasis: Electrocautery
Silver Nitrate Text: The wound bed was treated with silver nitrate after the biopsy was performed.
Biopsy Method: Personna blade
Wound Care: Aquaphor
Billing Type: Third-Party Bill
Cryotherapy Text: The wound bed was treated with cryotherapy after the biopsy was performed.
Detail Level: Detailed
Anesthesia Type: 1% lidocaine with 1:100,000 epinephrine

## 2021-11-02 DIAGNOSIS — Z87.74 HISTORY OF PERCUTANEOUS TRANSCATHETER CLOSURE OF CONGENITAL ASD: ICD-10-CM

## 2021-11-02 DIAGNOSIS — I10 ESSENTIAL HYPERTENSION: ICD-10-CM

## 2021-11-02 DIAGNOSIS — R07.9 CHEST PAIN, UNSPECIFIED TYPE: ICD-10-CM

## 2021-11-03 RX ORDER — METOPROLOL SUCCINATE 25 MG/1
25 TABLET, EXTENDED RELEASE ORAL DAILY
Qty: 90 TABLET | Refills: 3 | Status: SHIPPED | OUTPATIENT
Start: 2021-11-03 | End: 2021-11-19 | Stop reason: SDUPTHER

## 2021-11-03 RX ORDER — AMLODIPINE BESYLATE 5 MG/1
5 TABLET ORAL DAILY
Qty: 90 TABLET | Refills: 1 | Status: SHIPPED | OUTPATIENT
Start: 2021-11-03 | End: 2021-11-19 | Stop reason: SDUPTHER

## 2021-11-19 DIAGNOSIS — Z87.74 HISTORY OF PERCUTANEOUS TRANSCATHETER CLOSURE OF CONGENITAL ASD: ICD-10-CM

## 2021-11-19 DIAGNOSIS — I10 ESSENTIAL HYPERTENSION: ICD-10-CM

## 2021-11-19 DIAGNOSIS — R07.9 CHEST PAIN, UNSPECIFIED TYPE: ICD-10-CM

## 2021-11-22 RX ORDER — AMLODIPINE BESYLATE 5 MG/1
5 TABLET ORAL DAILY
Qty: 90 TABLET | Refills: 1 | Status: SHIPPED | OUTPATIENT
Start: 2021-11-22 | End: 2022-01-03 | Stop reason: SDUPTHER

## 2021-11-22 RX ORDER — METOPROLOL SUCCINATE 25 MG/1
25 TABLET, EXTENDED RELEASE ORAL DAILY
Qty: 90 TABLET | Refills: 3 | Status: SHIPPED | OUTPATIENT
Start: 2021-11-22 | End: 2022-01-03 | Stop reason: SDUPTHER

## 2021-12-31 ENCOUNTER — PATIENT MESSAGE (OUTPATIENT)
Dept: CARDIOLOGY | Facility: CLINIC | Age: 66
End: 2021-12-31
Payer: COMMERCIAL

## 2022-01-03 DIAGNOSIS — Z87.74 HISTORY OF PERCUTANEOUS TRANSCATHETER CLOSURE OF CONGENITAL ASD: ICD-10-CM

## 2022-01-03 DIAGNOSIS — I10 ESSENTIAL HYPERTENSION: ICD-10-CM

## 2022-01-03 DIAGNOSIS — R07.9 CHEST PAIN, UNSPECIFIED TYPE: ICD-10-CM

## 2022-01-03 RX ORDER — METOPROLOL SUCCINATE 25 MG/1
25 TABLET, EXTENDED RELEASE ORAL DAILY
Qty: 90 TABLET | Refills: 3 | Status: SHIPPED | OUTPATIENT
Start: 2022-01-03 | End: 2022-12-19 | Stop reason: SDUPTHER

## 2022-01-03 RX ORDER — AMLODIPINE BESYLATE 5 MG/1
5 TABLET ORAL DAILY
Qty: 90 TABLET | Refills: 1 | Status: SHIPPED | OUTPATIENT
Start: 2022-01-03 | End: 2022-08-16 | Stop reason: SDUPTHER

## 2022-01-04 PROBLEM — M85.89 OSTEOPENIA OF MULTIPLE SITES: Status: ACTIVE | Noted: 2022-01-04

## 2022-01-10 ENCOUNTER — LAB VISIT (OUTPATIENT)
Dept: LAB | Facility: HOSPITAL | Age: 67
End: 2022-01-10
Attending: INTERNAL MEDICINE
Payer: MEDICARE

## 2022-01-10 DIAGNOSIS — R10.9 ABDOMINAL PAIN, UNSPECIFIED ABDOMINAL LOCATION: ICD-10-CM

## 2022-01-10 DIAGNOSIS — R79.89 ELEVATED LFTS: ICD-10-CM

## 2022-01-10 DIAGNOSIS — R21 RASH: ICD-10-CM

## 2022-01-10 DIAGNOSIS — E03.4 HYPOTHYROIDISM DUE TO ACQUIRED ATROPHY OF THYROID: ICD-10-CM

## 2022-01-10 DIAGNOSIS — I10 PRIMARY HYPERTENSION: ICD-10-CM

## 2022-01-10 LAB
ALBUMIN SERPL BCP-MCNC: 3.9 G/DL (ref 3.5–5.2)
ALP SERPL-CCNC: 78 U/L (ref 55–135)
ALT SERPL W/O P-5'-P-CCNC: 16 U/L (ref 10–44)
ANION GAP SERPL CALC-SCNC: 8 MMOL/L (ref 8–16)
AST SERPL-CCNC: 17 U/L (ref 10–40)
BASOPHILS # BLD AUTO: 0.05 K/UL (ref 0–0.2)
BASOPHILS NFR BLD: 1.3 % (ref 0–1.9)
BILIRUB DIRECT SERPL-MCNC: 0.2 MG/DL (ref 0.1–0.3)
BILIRUB SERPL-MCNC: 0.6 MG/DL (ref 0.1–1)
BUN SERPL-MCNC: 20 MG/DL (ref 8–23)
CALCIUM SERPL-MCNC: 10 MG/DL (ref 8.7–10.5)
CHLORIDE SERPL-SCNC: 106 MMOL/L (ref 95–110)
CO2 SERPL-SCNC: 26 MMOL/L (ref 23–29)
CREAT SERPL-MCNC: 0.9 MG/DL (ref 0.5–1.4)
DIFFERENTIAL METHOD: ABNORMAL
EOSINOPHIL # BLD AUTO: 0.1 K/UL (ref 0–0.5)
EOSINOPHIL NFR BLD: 1.8 % (ref 0–8)
ERYTHROCYTE [DISTWIDTH] IN BLOOD BY AUTOMATED COUNT: 12.7 % (ref 11.5–14.5)
EST. GFR  (AFRICAN AMERICAN): >60 ML/MIN/1.73 M^2
EST. GFR  (NON AFRICAN AMERICAN): >60 ML/MIN/1.73 M^2
GGT SERPL-CCNC: 18 U/L (ref 8–55)
GLUCOSE SERPL-MCNC: 62 MG/DL (ref 70–110)
HCT VFR BLD AUTO: 41.6 % (ref 37–48.5)
HGB BLD-MCNC: 13.2 G/DL (ref 12–16)
IMM GRANULOCYTES # BLD AUTO: 0.01 K/UL (ref 0–0.04)
IMM GRANULOCYTES NFR BLD AUTO: 0.3 % (ref 0–0.5)
LYMPHOCYTES # BLD AUTO: 1.2 K/UL (ref 1–4.8)
LYMPHOCYTES NFR BLD: 31.5 % (ref 18–48)
MCH RBC QN AUTO: 28.3 PG (ref 27–31)
MCHC RBC AUTO-ENTMCNC: 31.7 G/DL (ref 32–36)
MCV RBC AUTO: 89 FL (ref 82–98)
MONOCYTES # BLD AUTO: 0.3 K/UL (ref 0.3–1)
MONOCYTES NFR BLD: 6.5 % (ref 4–15)
NEUTROPHILS # BLD AUTO: 2.3 K/UL (ref 1.8–7.7)
NEUTROPHILS NFR BLD: 58.6 % (ref 38–73)
NRBC BLD-RTO: 0 /100 WBC
PLATELET # BLD AUTO: 202 K/UL (ref 150–450)
PMV BLD AUTO: 11.6 FL (ref 9.2–12.9)
POTASSIUM SERPL-SCNC: 4.4 MMOL/L (ref 3.5–5.1)
PROT SERPL-MCNC: 6.6 G/DL (ref 6–8.4)
RBC # BLD AUTO: 4.66 M/UL (ref 4–5.4)
SODIUM SERPL-SCNC: 140 MMOL/L (ref 136–145)
WBC # BLD AUTO: 3.84 K/UL (ref 3.9–12.7)

## 2022-01-10 PROCEDURE — 80048 BASIC METABOLIC PNL TOTAL CA: CPT | Performed by: INTERNAL MEDICINE

## 2022-01-10 PROCEDURE — 80076 HEPATIC FUNCTION PANEL: CPT | Performed by: INTERNAL MEDICINE

## 2022-01-10 PROCEDURE — 82977 ASSAY OF GGT: CPT | Performed by: INTERNAL MEDICINE

## 2022-01-10 PROCEDURE — 86617 LYME DISEASE ANTIBODY: CPT | Performed by: INTERNAL MEDICINE

## 2022-01-10 PROCEDURE — 36415 COLL VENOUS BLD VENIPUNCTURE: CPT | Mod: PO | Performed by: INTERNAL MEDICINE

## 2022-01-10 PROCEDURE — 85025 COMPLETE CBC W/AUTO DIFF WBC: CPT | Performed by: INTERNAL MEDICINE

## 2022-01-14 ENCOUNTER — HOSPITAL ENCOUNTER (OUTPATIENT)
Dept: RADIOLOGY | Facility: OTHER | Age: 67
Discharge: HOME | End: 2022-01-14
Attending: INTERNAL MEDICINE
Payer: MEDICARE

## 2022-01-14 DIAGNOSIS — R10.84 GENERALIZED ABDOMINAL PAIN: ICD-10-CM

## 2022-01-14 PROCEDURE — 25500020 PHARM REV CODE 255: Performed by: INTERNAL MEDICINE

## 2022-01-14 PROCEDURE — A9698 NON-RAD CONTRAST MATERIALNOC: HCPCS | Performed by: INTERNAL MEDICINE

## 2022-01-14 PROCEDURE — 74177 CT ABD & PELVIS W/CONTRAST: CPT | Mod: 26,,, | Performed by: RADIOLOGY

## 2022-01-14 PROCEDURE — 74177 CT ABDOMEN PELVIS WITH CONTRAST: ICD-10-PCS | Mod: 26,,, | Performed by: RADIOLOGY

## 2022-01-14 PROCEDURE — 74177 CT ABD & PELVIS W/CONTRAST: CPT | Mod: TC

## 2022-01-14 RX ADMIN — IOHEXOL 75 ML: 350 INJECTION, SOLUTION INTRAVENOUS at 04:01

## 2022-01-14 RX ADMIN — IOHEXOL 1000 ML: 9 SOLUTION ORAL at 03:01

## 2022-01-18 ENCOUNTER — OFFICE VISIT (OUTPATIENT)
Dept: UROLOGY | Facility: CLINIC | Age: 67
End: 2022-01-18
Payer: MEDICARE

## 2022-01-18 VITALS
SYSTOLIC BLOOD PRESSURE: 141 MMHG | BODY MASS INDEX: 27.85 KG/M2 | HEIGHT: 66 IN | HEART RATE: 63 BPM | DIASTOLIC BLOOD PRESSURE: 79 MMHG | WEIGHT: 173.31 LBS

## 2022-01-18 DIAGNOSIS — N22 CALCULUS OF URINARY TRACT IN DISEASES CLASSIFIED ELSEWHERE: ICD-10-CM

## 2022-01-18 LAB
B BURGDOR IGG SER QL IB: NEGATIVE
B BURGDOR IGM SER QL IB: NEGATIVE

## 2022-01-18 PROCEDURE — 99214 PR OFFICE/OUTPT VISIT, EST, LEVL IV, 30-39 MIN: ICD-10-PCS | Mod: S$PBB,,, | Performed by: NURSE PRACTITIONER

## 2022-01-18 PROCEDURE — 99215 OFFICE O/P EST HI 40 MIN: CPT | Mod: PBBFAC | Performed by: NURSE PRACTITIONER

## 2022-01-18 PROCEDURE — 99999 PR PBB SHADOW E&M-EST. PATIENT-LVL V: CPT | Mod: PBBFAC,,, | Performed by: NURSE PRACTITIONER

## 2022-01-18 PROCEDURE — 99999 PR PBB SHADOW E&M-EST. PATIENT-LVL V: ICD-10-PCS | Mod: PBBFAC,,, | Performed by: NURSE PRACTITIONER

## 2022-01-18 PROCEDURE — 99214 OFFICE O/P EST MOD 30 MIN: CPT | Mod: S$PBB,,, | Performed by: NURSE PRACTITIONER

## 2022-01-18 NOTE — PROGRESS NOTES
CHIEF COMPLAINT:    Mrs. Amaro is a 66 y.o. female presenting for   Chief Complaint   Patient presents with    Nephrolithiasis     .  PRESENTING ILLNESS:    Socorro Amaro is a 66 y.o. female with a PMH of atrial septal defect, htn, hypothyroid who presents for ureteral stone.    Last seen in urology department by Dr Stanley for cystoscopy secondary hematuria. Presents as follow up for ureteral stone found on CT scan 1/14/22 for abdominal pain.  Scan remarkable for 6 mm obstructing calculus in left distal ureter.  Patient does not believe she has passed the stone since scan.  Pain currently manageable.  Will plan for passing trial - taking flomax and NSAIDs for pain.    REVIEW OF SYSTEMS:    Review of Systems   Constitutional: Negative for chills and fever.   Respiratory: Negative for shortness of breath.    Cardiovascular: Negative for chest pain.   Gastrointestinal: Negative for constipation and diarrhea.   Genitourinary: Negative for dysuria, flank pain, frequency, hematuria and urgency.   Neurological: Negative for dizziness and weakness.        PATIENT HISTORY:    Past Medical History:   Diagnosis Date    ASD (atrial septal defect)     HTN (hypertension)     STEMI (ST elevation myocardial infarction) 06/30/2018    embolic event    Thyroid disease        Family History   Problem Relation Age of Onset    Arthritis Mother     Cancer Mother 72        spindle cell ca  tongue     Hypertension Mother     Hyperlipidemia Mother     Depression Sister     Hypertension Brother     Depression Sister     Hypertension Father     Heart disease Father     Colon cancer Neg Hx     Ovarian cancer Neg Hx     Breast cancer Neg Hx        Allergies:  Erythromycin base, Erythromycin, and Losartan    Medications:    Current Outpatient Medications:     ALPRAZolam (XANAX) 0.25 MG tablet, TAKE 1 TABLET BY MOUTH 1 hour before flight, Disp: , Rfl: 0    amLODIPine (NORVASC) 5 MG tablet, Take 1 tablet (5 mg total) by mouth  once daily. Patient needs to schedule an appointment in the cardiology clinic., Disp: 90 tablet, Rfl: 1    aspirin (ECOTRIN) 81 MG EC tablet, Take 81 mg by mouth once daily., Disp: , Rfl:     cholecalciferol, vitamin D3, (VITAMIN D3) 25 mcg (1,000 unit) capsule, Take 1,000 Units by mouth once daily. , Disp: , Rfl:     levothyroxine (SYNTHROID) 75 MCG tablet, TAKE 1 TABLET BY MOUTH EVERY DAY BEFORE BREAKFAST, Disp: 90 tablet, Rfl: 2    loratadine (CLARITIN) 10 mg tablet, Allerclear 10 mg tablet  Take 1 tablet as needed by oral route., Disp: , Rfl:     loteprednol (ALREX) 0.2 % DrpS, once daily. , Disp: , Rfl:     metoprolol succinate (TOPROL-XL) 25 MG 24 hr tablet, Take 1 tablet (25 mg total) by mouth once daily., Disp: 90 tablet, Rfl: 3    multivitamin capsule, Take 1 capsule by mouth once daily., Disp: , Rfl:     olopatadine (PATANOL) 0.1 % ophthalmic solution, Place 1 drop into both eyes 2 (two) times daily., Disp: 5 mL, Rfl: 6    omeprazole (PRILOSEC) 40 MG capsule, omeprazole 40 mg capsule,delayed release  Take 1 capsule every day by oral route., Disp: 30 capsule, Rfl: 1    ondansetron (ZOFRAN) 8 MG tablet, Take 1 tablet (8 mg total) by mouth every 8 (eight) hours as needed for Nausea., Disp: 20 tablet, Rfl: 0    PRASTERONE, DHEA, (DHEA ORAL), Take by mouth once daily. 50 mg, Disp: , Rfl:     co-enzyme Q-10 30 mg capsule, Take 300 mg by mouth once daily., Disp: , Rfl:     cyanocobalamin (VITAMIN B-12) 1000 MCG tablet, Take 5,000 mcg by mouth once daily., Disp: , Rfl:     PHOSPHATIDYL SERINE, BULK, MISC, 1 capsule by Misc.(Non-Drug; Combo Route) route once daily. 150 mg per capsule, Disp: , Rfl:     Current Facility-Administered Medications:     cephALEXin capsule 500 mg, 500 mg, Oral, 1 time in Clinic/HOD, Osorio Stanley MD    lidocaine HCl 2% urojet, , Mucous Membrane, 1 time in Clinic/HOD, Osorio Stanley MD    PHYSICAL EXAMINATION:    Physical Exam  Vitals reviewed.   Constitutional:        Appearance: Normal appearance. She is well-developed.   HENT:      Head: Normocephalic and atraumatic.   Eyes:      Pupils: Pupils are equal, round, and reactive to light.   Pulmonary:      Effort: Pulmonary effort is normal.   Abdominal:      Tenderness: There is left CVA tenderness.   Musculoskeletal:         General: Normal range of motion.      Cervical back: Normal range of motion.   Skin:     General: Skin is warm and dry.   Neurological:      Mental Status: She is alert and oriented to person, place, and time.   Psychiatric:         Mood and Affect: Mood and affect normal.         Behavior: Behavior normal.           LABS:     U/a dipstick performed in office today: trace bld, clear of infection    IMPRESSION:    Encounter Diagnoses   Name Primary?    Calculus of urinary tract in diseases classified elsewhere         CT abd/pelvis with Contrast 1/14/22:     Impression:     6 mm obstructing calculus in the distal left ureter with moderate left hydroureteronephrosis.         PLAN:    Problem List Items Addressed This Visit    None     Visit Diagnoses     Calculus of urinary tract in diseases classified elsewhere        Relevant Orders    CT Renal Stone Study ABD Pelvis WO    X-Ray Abdomen AP 1 View    POCT URINE DIPSTICK WITHOUT MICROSCOPE          1. Ureteral stone   Drink 2-3 liters of water daily  Continue flomax  Strain every urine.  Strainer provided.    Patient instructed to bring in stone for stone analysis.  Sterile cup provided.    Will repeat CT RSS if patient has not passed stone in 2 weeks.  Informed patient that CT RSS will be canceled if he passes the stone prior to the visit.        Get prompt medical attention if any of the following occur:  · Severe pain that returns and not relieved by pain medicines  · Repeated vomiting or unable to keep down fluids  · Weakness, dizziness or fainting  · Fever of 101.4ºF (38ºC) or higher, or as directed by your healthcare provider  · Blood clots in  urine  · Foul smelling or cloudy urine  · Unable to pass urine for 8 hours or increasing bladder pressure    2.  RTC based on stone passage    fOelia Wiggins, NP

## 2022-01-21 ENCOUNTER — PATIENT MESSAGE (OUTPATIENT)
Dept: UROLOGY | Facility: CLINIC | Age: 67
End: 2022-01-21
Payer: MEDICARE

## 2022-01-24 ENCOUNTER — PATIENT MESSAGE (OUTPATIENT)
Dept: UROLOGY | Facility: CLINIC | Age: 67
End: 2022-01-24
Payer: MEDICARE

## 2022-01-24 ENCOUNTER — OFFICE VISIT (OUTPATIENT)
Dept: UROLOGY | Facility: CLINIC | Age: 67
End: 2022-01-24
Attending: UROLOGY
Payer: MEDICARE

## 2022-01-24 VITALS
HEIGHT: 66 IN | BODY MASS INDEX: 27.85 KG/M2 | SYSTOLIC BLOOD PRESSURE: 120 MMHG | WEIGHT: 173.31 LBS | DIASTOLIC BLOOD PRESSURE: 72 MMHG

## 2022-01-24 DIAGNOSIS — N22 CALCULUS OF URINARY TRACT IN DISEASES CLASSIFIED ELSEWHERE: Primary | ICD-10-CM

## 2022-01-24 DIAGNOSIS — N20.0 NEPHROLITHIASIS: ICD-10-CM

## 2022-01-24 PROCEDURE — 99214 OFFICE O/P EST MOD 30 MIN: CPT | Mod: S$GLB,,, | Performed by: UROLOGY

## 2022-01-24 PROCEDURE — 99214 PR OFFICE/OUTPT VISIT, EST, LEVL IV, 30-39 MIN: ICD-10-PCS | Mod: S$GLB,,, | Performed by: UROLOGY

## 2022-01-24 RX ORDER — CIPROFLOXACIN 2 MG/ML
400 INJECTION, SOLUTION INTRAVENOUS
Status: CANCELLED | OUTPATIENT
Start: 2022-01-24

## 2022-01-24 RX ORDER — TAMSULOSIN HYDROCHLORIDE 0.4 MG/1
2 CAPSULE ORAL DAILY
COMMUNITY
Start: 2022-01-14 | End: 2023-08-16

## 2022-01-24 NOTE — H&P (VIEW-ONLY)
"Subjective:      Socorro Amaro is a 66 y.o. female who returns today regarding her nephrolithiasis.    Diagnosed with ureteral stone 2 weeks ago and has had ongoing intermittent symptoms since. Seen at [facility] last week with plan for surgery next week, however her symptoms have been more bothersome in the last few days and she would like to have this done sooner if possible.     The following portions of the patient's history were reviewed and updated as appropriate: allergies, current medications, past family history, past medical history, past social history, past surgical history and problem list.    Review of Systems  A comprehensive multipoint review of systems was negative except as otherwise stated in the HPI.     Objective:   Vitals: /72 (BP Location: Right arm, Patient Position: Sitting, BP Method: Medium (Manual))   Ht 5' 6" (1.676 m)   Wt 78.6 kg (173 lb 4.5 oz)   BMI 27.97 kg/m²     Physical Exam   General: alert and oriented, no acute distress  Respiratory: Symmetric expansion, non-labored breathing  Neuro: no gross deficits  Psych: normal judgment and insight, normal mood/affect and non-anxious    Lab Review     Lab Results   Component Value Date    WBC 3.84 (L) 01/10/2022    HGB 13.2 01/10/2022    HCT 41.6 01/10/2022    MCV 89 01/10/2022     01/10/2022     Lab Results   Component Value Date    CREATININE 0.9 01/10/2022    BUN 20 01/10/2022       Imaging (all images personally reviewed; agree with report below)  Results for orders placed during the hospital encounter of 01/14/22    CT Abdomen Pelvis With Contrast    Narrative  EXAMINATION:  CT ABDOMEN PELVIS WITH CONTRAST    CLINICAL HISTORY:  Abdominal pain, acute, nonlocalized; Generalized abdominal pain    TECHNIQUE:  Low dose axial images, sagittal and coronal reformations were obtained from the lung bases to the pubic symphysis following the IV administration of 75 mL of Omnipaque 350 and the oral administration of 1000 mL of " Omnipaque 9.    COMPARISON:  Renal ultrasound 01/07/2021    FINDINGS:  Limited evaluation of structures at the base of the chest show no acute or concerning findings.    The liver is normal in size.  No solid mass or biliary ductal dilatation.  The gallbladder is grossly unremarkable.    The spleen, adrenal glands, and pancreas show no focal abnormality.    There is moderate left hydroureteronephrosis.  There is a 6 mm obstructing calculus in the distal left ureter.  The right kidney is normal in size and position without hydronephrosis or nephrolithiasis.  The urinary bladder shows no focal wall thickening.  The uterus is somewhat atrophic.    Gastrointestinal tract shows no wall thickening or obstruction.  A normal appendix is identified.  No free fluid or free gas.  No concerning lymphadenopathy.    Vascular structures show normal course and caliber.  No acute fracture or destructive osseous lesion.    Impression  6 mm obstructing calculus in the distal left ureter with moderate left hydroureteronephrosis.    This report was flagged in Epic as abnormal.      Electronically signed by: Edu Alonso MD  Date:    01/14/2022  Time:    16:35       Assessment and Plan:   Nephrolithiasis  -- Will proceed with ureteroscopy at [facility] tomorrow

## 2022-01-24 NOTE — Clinical Note
This patient reached me through her PCP to see if I could do her surgery sooner than next week. As it turns out I have time tomorrow and she was able to get in with me this afternoon to set that up. I saw your notes about scheduling her there with Dr. Carbone and just wanted to give you a heads up.

## 2022-01-24 NOTE — TELEPHONE ENCOUNTER
Just wanted to make sure you had paperwork for this patient. Had spoke with Dr. Borges on Friday.    Thanks

## 2022-01-24 NOTE — PROGRESS NOTES
"Subjective:      Socorro Amaro is a 66 y.o. female who returns today regarding her nephrolithiasis.    Diagnosed with ureteral stone 2 weeks ago and has had ongoing intermittent symptoms since. Seen at [facility] last week with plan for surgery next week, however her symptoms have been more bothersome in the last few days and she would like to have this done sooner if possible.     The following portions of the patient's history were reviewed and updated as appropriate: allergies, current medications, past family history, past medical history, past social history, past surgical history and problem list.    Review of Systems  A comprehensive multipoint review of systems was negative except as otherwise stated in the HPI.     Objective:   Vitals: /72 (BP Location: Right arm, Patient Position: Sitting, BP Method: Medium (Manual))   Ht 5' 6" (1.676 m)   Wt 78.6 kg (173 lb 4.5 oz)   BMI 27.97 kg/m²     Physical Exam   General: alert and oriented, no acute distress  Respiratory: Symmetric expansion, non-labored breathing  Neuro: no gross deficits  Psych: normal judgment and insight, normal mood/affect and non-anxious    Lab Review     Lab Results   Component Value Date    WBC 3.84 (L) 01/10/2022    HGB 13.2 01/10/2022    HCT 41.6 01/10/2022    MCV 89 01/10/2022     01/10/2022     Lab Results   Component Value Date    CREATININE 0.9 01/10/2022    BUN 20 01/10/2022       Imaging (all images personally reviewed; agree with report below)  Results for orders placed during the hospital encounter of 01/14/22    CT Abdomen Pelvis With Contrast    Narrative  EXAMINATION:  CT ABDOMEN PELVIS WITH CONTRAST    CLINICAL HISTORY:  Abdominal pain, acute, nonlocalized; Generalized abdominal pain    TECHNIQUE:  Low dose axial images, sagittal and coronal reformations were obtained from the lung bases to the pubic symphysis following the IV administration of 75 mL of Omnipaque 350 and the oral administration of 1000 mL of " Omnipaque 9.    COMPARISON:  Renal ultrasound 01/07/2021    FINDINGS:  Limited evaluation of structures at the base of the chest show no acute or concerning findings.    The liver is normal in size.  No solid mass or biliary ductal dilatation.  The gallbladder is grossly unremarkable.    The spleen, adrenal glands, and pancreas show no focal abnormality.    There is moderate left hydroureteronephrosis.  There is a 6 mm obstructing calculus in the distal left ureter.  The right kidney is normal in size and position without hydronephrosis or nephrolithiasis.  The urinary bladder shows no focal wall thickening.  The uterus is somewhat atrophic.    Gastrointestinal tract shows no wall thickening or obstruction.  A normal appendix is identified.  No free fluid or free gas.  No concerning lymphadenopathy.    Vascular structures show normal course and caliber.  No acute fracture or destructive osseous lesion.    Impression  6 mm obstructing calculus in the distal left ureter with moderate left hydroureteronephrosis.    This report was flagged in Epic as abnormal.      Electronically signed by: Edu Alonso MD  Date:    01/14/2022  Time:    16:35       Assessment and Plan:   Nephrolithiasis  -- Will proceed with ureteroscopy at [facility] tomorrow

## 2022-01-25 ENCOUNTER — ANESTHESIA (OUTPATIENT)
Dept: SURGERY | Facility: OTHER | Age: 67
End: 2022-01-25
Payer: MEDICARE

## 2022-01-25 ENCOUNTER — HOSPITAL ENCOUNTER (OUTPATIENT)
Facility: OTHER | Age: 67
Discharge: HOME | End: 2022-01-25
Attending: UROLOGY | Admitting: UROLOGY
Payer: MEDICARE

## 2022-01-25 ENCOUNTER — ANESTHESIA EVENT (OUTPATIENT)
Dept: SURGERY | Facility: OTHER | Age: 67
End: 2022-01-25
Payer: MEDICARE

## 2022-01-25 VITALS
BODY MASS INDEX: 27.85 KG/M2 | SYSTOLIC BLOOD PRESSURE: 136 MMHG | TEMPERATURE: 98 F | RESPIRATION RATE: 16 BRPM | HEIGHT: 66 IN | HEART RATE: 75 BPM | DIASTOLIC BLOOD PRESSURE: 69 MMHG | WEIGHT: 173.31 LBS | OXYGEN SATURATION: 98 %

## 2022-01-25 DIAGNOSIS — N20.0 NEPHROLITHIASIS: Primary | ICD-10-CM

## 2022-01-25 DIAGNOSIS — N22 CALCULUS OF URINARY TRACT IN DISEASES CLASSIFIED ELSEWHERE: Primary | ICD-10-CM

## 2022-01-25 LAB
CTP QC/QA: YES
SARS-COV-2 AG RESP QL IA.RAPID: NEGATIVE

## 2022-01-25 PROCEDURE — C1758 CATHETER, URETERAL: HCPCS | Performed by: UROLOGY

## 2022-01-25 PROCEDURE — 71000016 HC POSTOP RECOV ADDL HR: Performed by: UROLOGY

## 2022-01-25 PROCEDURE — 36000706: Performed by: UROLOGY

## 2022-01-25 PROCEDURE — 71000015 HC POSTOP RECOV 1ST HR: Performed by: UROLOGY

## 2022-01-25 PROCEDURE — C1769 GUIDE WIRE: HCPCS | Performed by: UROLOGY

## 2022-01-25 PROCEDURE — 37000008 HC ANESTHESIA 1ST 15 MINUTES: Performed by: UROLOGY

## 2022-01-25 PROCEDURE — 25000003 PHARM REV CODE 250: Performed by: ANESTHESIOLOGY

## 2022-01-25 PROCEDURE — C2617 STENT, NON-COR, TEM W/O DEL: HCPCS | Performed by: UROLOGY

## 2022-01-25 PROCEDURE — 25000003 PHARM REV CODE 250: Performed by: UROLOGY

## 2022-01-25 PROCEDURE — 71000033 HC RECOVERY, INTIAL HOUR: Performed by: UROLOGY

## 2022-01-25 PROCEDURE — 63600175 PHARM REV CODE 636 W HCPCS: Performed by: UROLOGY

## 2022-01-25 PROCEDURE — 25000003 PHARM REV CODE 250: Performed by: NURSE ANESTHETIST, CERTIFIED REGISTERED

## 2022-01-25 PROCEDURE — 52356 PR CYSTO/URETERO W/LITHOTRIPSY: ICD-10-PCS | Mod: LT,,, | Performed by: UROLOGY

## 2022-01-25 PROCEDURE — 27201423 OPTIME MED/SURG SUP & DEVICES STERILE SUPPLY: Performed by: UROLOGY

## 2022-01-25 PROCEDURE — 36000707: Performed by: UROLOGY

## 2022-01-25 PROCEDURE — 82365 CALCULUS SPECTROSCOPY: CPT | Performed by: UROLOGY

## 2022-01-25 PROCEDURE — 52356 CYSTO/URETERO W/LITHOTRIPSY: CPT | Mod: LT,,, | Performed by: UROLOGY

## 2022-01-25 PROCEDURE — 37000009 HC ANESTHESIA EA ADD 15 MINS: Performed by: UROLOGY

## 2022-01-25 PROCEDURE — 63600175 PHARM REV CODE 636 W HCPCS: Performed by: NURSE ANESTHETIST, CERTIFIED REGISTERED

## 2022-01-25 DEVICE — STENT SET URETERAL 6X24CM: Type: IMPLANTABLE DEVICE | Site: URETER | Status: FUNCTIONAL

## 2022-01-25 RX ORDER — MEPERIDINE HYDROCHLORIDE 25 MG/ML
12.5 INJECTION INTRAMUSCULAR; INTRAVENOUS; SUBCUTANEOUS ONCE AS NEEDED
Status: DISCONTINUED | OUTPATIENT
Start: 2022-01-25 | End: 2022-01-25 | Stop reason: HOSPADM

## 2022-01-25 RX ORDER — ATROPA BELLADONNA AND OPIUM 16.2; 6 MG/1; MG/1
SUPPOSITORY RECTAL
Status: DISCONTINUED | OUTPATIENT
Start: 2022-01-25 | End: 2022-01-25 | Stop reason: HOSPADM

## 2022-01-25 RX ORDER — TAMSULOSIN HYDROCHLORIDE 0.4 MG/1
0.4 CAPSULE ORAL DAILY
Qty: 14 CAPSULE | Refills: 0 | Status: SHIPPED | OUTPATIENT
Start: 2022-01-25 | End: 2023-08-16

## 2022-01-25 RX ORDER — NAPROXEN SODIUM 220 MG
200 TABLET ORAL 2 TIMES DAILY WITH MEALS
COMMUNITY
End: 2022-02-18

## 2022-01-25 RX ORDER — PROPOFOL 10 MG/ML
VIAL (ML) INTRAVENOUS
Status: DISCONTINUED | OUTPATIENT
Start: 2022-01-25 | End: 2022-01-25

## 2022-01-25 RX ORDER — CIPROFLOXACIN 2 MG/ML
400 INJECTION, SOLUTION INTRAVENOUS
Status: COMPLETED | OUTPATIENT
Start: 2022-01-25 | End: 2022-01-25

## 2022-01-25 RX ORDER — SODIUM CHLORIDE 0.9 % (FLUSH) 0.9 %
3 SYRINGE (ML) INJECTION
Status: DISCONTINUED | OUTPATIENT
Start: 2022-01-25 | End: 2022-01-25 | Stop reason: HOSPADM

## 2022-01-25 RX ORDER — HYDROMORPHONE HYDROCHLORIDE 2 MG/ML
0.4 INJECTION, SOLUTION INTRAMUSCULAR; INTRAVENOUS; SUBCUTANEOUS EVERY 5 MIN PRN
Status: DISCONTINUED | OUTPATIENT
Start: 2022-01-25 | End: 2022-01-25 | Stop reason: HOSPADM

## 2022-01-25 RX ORDER — OXYBUTYNIN CHLORIDE 5 MG/1
5 TABLET ORAL 3 TIMES DAILY PRN
Qty: 30 TABLET | Refills: 0 | Status: SHIPPED | OUTPATIENT
Start: 2022-01-25 | End: 2022-02-18

## 2022-01-25 RX ORDER — MIDAZOLAM HYDROCHLORIDE 1 MG/ML
INJECTION INTRAMUSCULAR; INTRAVENOUS
Status: DISCONTINUED | OUTPATIENT
Start: 2022-01-25 | End: 2022-01-25

## 2022-01-25 RX ORDER — OXYCODONE HYDROCHLORIDE 5 MG/1
5 TABLET ORAL
Status: DISCONTINUED | OUTPATIENT
Start: 2022-01-25 | End: 2022-01-25 | Stop reason: HOSPADM

## 2022-01-25 RX ORDER — FENTANYL CITRATE 50 UG/ML
INJECTION, SOLUTION INTRAMUSCULAR; INTRAVENOUS
Status: DISCONTINUED | OUTPATIENT
Start: 2022-01-25 | End: 2022-01-25

## 2022-01-25 RX ORDER — PHENAZOPYRIDINE HYDROCHLORIDE 100 MG/1
100 TABLET, FILM COATED ORAL 3 TIMES DAILY PRN
Qty: 30 TABLET | Refills: 0 | Status: SHIPPED | OUTPATIENT
Start: 2022-01-25 | End: 2022-02-04

## 2022-01-25 RX ORDER — DEXAMETHASONE SODIUM PHOSPHATE 4 MG/ML
INJECTION, SOLUTION INTRA-ARTICULAR; INTRALESIONAL; INTRAMUSCULAR; INTRAVENOUS; SOFT TISSUE
Status: DISCONTINUED | OUTPATIENT
Start: 2022-01-25 | End: 2022-01-25

## 2022-01-25 RX ORDER — PHENYLEPHRINE HCL IN 0.9% NACL 1 MG/10 ML
SYRINGE (ML) INTRAVENOUS
Status: DISCONTINUED | OUTPATIENT
Start: 2022-01-25 | End: 2022-01-25

## 2022-01-25 RX ORDER — DIPHENHYDRAMINE HYDROCHLORIDE 50 MG/ML
25 INJECTION INTRAMUSCULAR; INTRAVENOUS EVERY 6 HOURS PRN
Status: DISCONTINUED | OUTPATIENT
Start: 2022-01-25 | End: 2022-01-25 | Stop reason: HOSPADM

## 2022-01-25 RX ORDER — PROCHLORPERAZINE EDISYLATE 5 MG/ML
5 INJECTION INTRAMUSCULAR; INTRAVENOUS EVERY 30 MIN PRN
Status: DISCONTINUED | OUTPATIENT
Start: 2022-01-25 | End: 2022-01-25 | Stop reason: HOSPADM

## 2022-01-25 RX ORDER — ONDANSETRON 2 MG/ML
INJECTION INTRAMUSCULAR; INTRAVENOUS
Status: DISCONTINUED | OUTPATIENT
Start: 2022-01-25 | End: 2022-01-25

## 2022-01-25 RX ADMIN — GLYCOPYRROLATE 0.2 MG: 0.2 INJECTION, SOLUTION INTRAMUSCULAR; INTRAVITREAL at 12:01

## 2022-01-25 RX ADMIN — ONDANSETRON HYDROCHLORIDE 4 MG: 2 INJECTION INTRAMUSCULAR; INTRAVENOUS at 01:01

## 2022-01-25 RX ADMIN — PROPOFOL 170 MG: 10 INJECTION, EMULSION INTRAVENOUS at 12:01

## 2022-01-25 RX ADMIN — FENTANYL CITRATE 50 MCG: 50 INJECTION, SOLUTION INTRAMUSCULAR; INTRAVENOUS at 12:01

## 2022-01-25 RX ADMIN — Medication 100 MCG: at 01:01

## 2022-01-25 RX ADMIN — CIPROFLOXACIN 400 MG: 2 INJECTION, SOLUTION INTRAVENOUS at 12:01

## 2022-01-25 RX ADMIN — DEXAMETHASONE SODIUM PHOSPHATE 8 MG: 4 INJECTION INTRA-ARTICULAR; INTRALESIONAL; INTRAMUSCULAR; INTRAVENOUS; SOFT TISSUE at 12:01

## 2022-01-25 RX ADMIN — OXYCODONE 5 MG: 5 TABLET ORAL at 02:01

## 2022-01-25 RX ADMIN — MIDAZOLAM 2 MG: 1 INJECTION INTRAMUSCULAR; INTRAVENOUS at 12:01

## 2022-01-25 NOTE — OP NOTE
[facility]  Operative Note    Date: 01/25/2022    Pre-Op Diagnosis:   - Left UVJ stone    Post-Op Diagnosis: same    Procedure(s) Performed:   1.  Left ureteroscopy  2.  Cystoscopy  3.  Laser lithotripsy and basket extraction  4.  Left ureteral stent placement  5.  Fluoro < 1 h    Specimen(s): stone for analysis    Staff Surgeon: Adolph Stanley MD    Assistant Surgeon: Vasiliy Uriostegui MD    Anesthesia: General LMA anesthesia    Indications: Socorro Amaro is a 66 y.o. female with a left UVJ stone, presenting for definitive stone management.  She currently does not have a JJ ureteral stent in place.      Findings:   Tight and edematous left UO. Stone encountered in distal left ureter, lased and basket extracted.    1 baskets were used throughout the case.      Estimated Blood Loss: min    Drains: 6 Fr x 24 cm JJ ureteral stent with strings    Procedure in detail:  After informed consent was obtained, the patient was brought the the cystoscopy suite and placed in the supine position.  SCDs were applied and working.  Anesthesia was administered.  The patient was then placed in the dorsal lithotomy position and prepped and draped in the usual sterile fashion.      A rigid cystoscope in a 22 Fr sheath was introduced into the patient's urethra.  This passed easily.  The entire urethra was visualized which showed no strictures or masses.  Formal cystoscopy was performed which revealed no masses or lesions suspicious for malignancy, no bladder stones, no bladder diverticuli, no trabeculations.  The ureteral orifices were visualized in the normal anatomic position bilaterally.     A wire was passed up the left ureteral orifice and up into the kidney.  This passed easily and placement was confirmed using fluoro.  The cystoscope was removed keeping the wire in place.     A semirigid ureteroscope was passed into the patient's bladder alongside the wire under direct vision.  It was then passed through the  left ureteral orifice alongside the wire.  A stone was encountered at the level of UVJ.  A laser fiber was passed through the ureteroscope.  The stone was fragmented/ dusted  using the laser.  A Nitinol tipless basket was introduced through the ureteroscope.  Stone fragments were removed and placed in the bladder.  The ureteroscope was reinserted up to the UPJ and the ureter was cleared.      A 6 Fr x 24 cm JJ ureteral stent with strings was passed over the wire and up into the renal pelvis using fluoro.  When the coil appeared to be in good position in the kidney, the wire was removed under continuous fluoro.  Good coils were seen in the kidney and the bladder using fluoro.      The cystoscope was re-introduced into the bladder and the stone fragments were removed and the bladder was drained.    The patient tolerated the procedure well and was transferred to the recovery room in stable condition.      Disposition:  The patient will follow up with Dr. Stanley in 1 month with a renal ultrasound prior.     Vasiliy Uriostegui MD     Attestation:  I have reviewed the notes, assessments, and/or procedures documented by Dr. Uriostegui and I concur with her/his documentation of Socorro Amaro.     I was present for the entire procedure.    Adolph Stanley MD

## 2022-01-25 NOTE — PATIENT INSTRUCTIONS
Post Cystoscopy Instructions  Do not strain to have a bowel movement  No strenuous exercise x 7 days  No driving while you are on narcotic pain medications or if your richardson  catheter is in place    You can expect:  To pass stone fragments if you had a stone procedure  Have pain when you void from your stent if you have a stent in place  See blood in your urine if you have a stent in place    If you have a catheter, please return to the ER if your catheter stops draining or you are having abdominal pain.    Call the doctor if:   Temperature is greater than 101F   Persistent vomiting and inability to keep food down   Inability to void if you do not have a catheter    Please pull your stent with strings at 7 am the morning of Monday (1/31) . Pull the strings with a steady, gentle force in one motion until the entire stent is removed.

## 2022-01-25 NOTE — TRANSFER OF CARE
"Anesthesia Transfer of Care Note    Patient: Socorro Amaro    Procedure(s) Performed: Procedure(s) (LRB):  CYSTOURETEROSCOPY,WITH HOLMIUM LASER LITHOTRIPSY OF URETERAL CALCULUS (Left)    Patient location: PACU    Anesthesia Type: general    Transport from OR: Transported from OR on 2-3 L/min O2 by NC with adequate spontaneous ventilation    Post pain: adequate analgesia    Post assessment: no apparent anesthetic complications and tolerated procedure well    Post vital signs: stable    Level of consciousness: awake and alert    Nausea/Vomiting: no nausea/vomiting    Complications: none    Transfer of care protocol was followed      Last vitals:   Visit Vitals  BP (!) 140/66 (BP Location: Left arm, Patient Position: Lying)   Pulse 65   Temp 36.4 °C (97.6 °F) (Oral)   Resp 18   Ht 5' 6" (1.676 m)   Wt 78.6 kg (173 lb 4.5 oz)   SpO2 100%   Breastfeeding No   BMI 27.97 kg/m²     "

## 2022-01-25 NOTE — ANESTHESIA POSTPROCEDURE EVALUATION
Anesthesia Post Evaluation    Patient: Socorro Amaro    Procedure(s) Performed: Procedure(s) (LRB):  CYSTOURETEROSCOPY,WITH HOLMIUM LASER LITHOTRIPSY OF URETERAL CALCULUS (Left)  STENT, URETERAL (Left)    Final Anesthesia Type: general      Patient location during evaluation: PACU  Patient participation: Yes- Able to Participate  Level of consciousness: awake and alert  Post-procedure vital signs: reviewed and stable  Pain management: adequate  Airway patency: patent  PAULINA mitigation strategies: Extubation while patient is awake  PONV status at discharge: No PONV  Anesthetic complications: no      Cardiovascular status: hemodynamically stable  Respiratory status: unassisted  Hydration status: euvolemic  Follow-up not needed.          Vitals Value Taken Time   /72 01/25/22 1415   Temp 36.4 °C (97.6 °F) 01/25/22 1350   Pulse 69 01/25/22 1415   Resp 16 01/25/22 1415   SpO2 99 % 01/25/22 1415         Event Time   Out of Recovery 14:18:00         Pain/Curtis Score: Pain Rating Prior to Med Admin: 2 (1/25/2022  2:03 PM)  Curtis Score: 10 (1/25/2022  2:15 PM)

## 2022-01-25 NOTE — DISCHARGE SUMMARY
[facility]  Discharge Note  Short Stay    Admit Date: 1/25/2022    Discharge Date and Time: 01/25/2022 1:52 PM      Attending Physician: Osorio Stanley MD     Discharge Provider: Vasiliy Uriostegui    Diagnoses:  Past Medical History:   Diagnosis Date    ASD (atrial septal defect)     HTN (hypertension)     STEMI (ST elevation myocardial infarction) 06/30/2018    embolic event    Thyroid disease        Discharged Condition: good    Hospital Course: Patient was admitted for ureteroscopy and tolerated the procedure well with no complications. The patient was discharged home in good condition on the same day.       Final Diagnoses: Same as principal problem.    Disposition: Home or Self Care    Follow up/Patient Instructions:    Medications:  Reconciled Home Medications:   Current Discharge Medication List      START taking these medications    Details   oxybutynin (DITROPAN) 5 MG Tab Take 1 tablet (5 mg total) by mouth 3 (three) times daily as needed (bladder spasms).  Qty: 30 tablet, Refills: 0      phenazopyridine (PYRIDIUM) 100 MG tablet Take 1 tablet (100 mg total) by mouth 3 (three) times daily as needed for Pain.  Qty: 30 tablet, Refills: 0      !! tamsulosin (FLOMAX) 0.4 mg Cap Take 1 capsule (0.4 mg total) by mouth once daily.  Qty: 14 capsule, Refills: 0       !! - Potential duplicate medications found. Please discuss with provider.      CONTINUE these medications which have NOT CHANGED    Details   amLODIPine (NORVASC) 5 MG tablet Take 1 tablet (5 mg total) by mouth once daily. Patient needs to schedule an appointment in the cardiology clinic.  Qty: 90 tablet, Refills: 1    Comments: .  Associated Diagnoses: History of percutaneous transcatheter closure of congenital ASD; Essential hypertension; Chest pain, unspecified type      aspirin (ECOTRIN) 81 MG EC tablet Take 81 mg by mouth once daily.      cholecalciferol, vitamin D3, (VITAMIN D3) 25 mcg (1,000 unit) capsule Take 1,000 Units by  mouth once daily.       co-enzyme Q-10 30 mg capsule Take 300 mg by mouth once daily.      cyanocobalamin (VITAMIN B-12) 1000 MCG tablet Take 5,000 mcg by mouth once daily.      levothyroxine (SYNTHROID) 75 MCG tablet TAKE 1 TABLET BY MOUTH EVERY DAY BEFORE BREAKFAST  Qty: 90 tablet, Refills: 2    Comments: This prescription was filled on 9/8/2020. Any refills authorized will be placed on file.      loratadine (CLARITIN) 10 mg tablet Allerclear 10 mg tablet   Take 1 tablet as needed by oral route.      loteprednol (ALREX) 0.2 % DrpS once daily.       metoprolol succinate (TOPROL-XL) 25 MG 24 hr tablet Take 1 tablet (25 mg total) by mouth once daily.  Qty: 90 tablet, Refills: 3    Comments: This prescription was filled on 6/8/2020. Any refills authorized will be placed on file.      multivitamin capsule Take 1 capsule by mouth once daily.      naproxen sodium (ANAPROX) 220 MG tablet Take 200 mg by mouth 2 (two) times daily with meals.      olopatadine (PATANOL) 0.1 % ophthalmic solution Place 1 drop into both eyes 2 (two) times daily.  Qty: 5 mL, Refills: 6      omeprazole (PRILOSEC) 40 MG capsule omeprazole 40 mg capsule,delayed release   Take 1 capsule every day by oral route.  Qty: 30 capsule, Refills: 1      ondansetron (ZOFRAN) 8 MG tablet Take 1 tablet (8 mg total) by mouth every 8 (eight) hours as needed for Nausea.  Qty: 20 tablet, Refills: 0      !! tamsulosin (FLOMAX) 0.4 mg Cap Take 2 capsules by mouth once daily.      ALPRAZolam (XANAX) 0.25 MG tablet TAKE 1 TABLET BY MOUTH 1 hour before flight  Refills: 0      PHOSPHATIDYL SERINE, BULK, MISC 1 capsule by Misc.(Non-Drug; Combo Route) route once daily. 150 mg per capsule      PRASTERONE, DHEA, (DHEA ORAL) Take by mouth once daily. 50 mg       !! - Potential duplicate medications found. Please discuss with provider.        Discharge Procedure Orders   Activity as tolerated      Follow-up Information     Osorio Stanley MD In 4 weeks.    Specialty:  Urology  Why: post-op follow up  Contact information:  [address] [address]  [phone]                         Discharge Procedure Orders (must include Diet, Follow-up, Activity):   Discharge Procedure Orders (must include Diet, Follow-up, Activity)   Activity as tolerated

## 2022-01-25 NOTE — PLAN OF CARE
Socorro Amaro has met all discharge criteria from Phase II. Vital Signs are stable, ambulating  without difficulty. Discharge instructions given, patient verbalized understanding. Discharged from facility via wheelchair in stable condition.

## 2022-01-25 NOTE — ANESTHESIA PREPROCEDURE EVALUATION
01/25/2022  Socorro Amaro is a 66 y.o., female.    Anesthesia Evaluation    I have reviewed the Patient Summary Reports.    I have reviewed the Nursing Notes.    I have reviewed the Medications.     Review of Systems  Anesthesia Hx:  Denies Family Hx of Anesthesia complications.   Denies Personal Hx of Anesthesia complications.   Cardiovascular:   Hypertension Past MI CAD   Had closure of asd post MI in 2018   Endocrine:   Hypothyroidism        Physical Exam   Airway/Jaw/Neck:  Airway Findings: Mallampati: II                Anesthesia Plan  Type of Anesthesia, risks & benefits discussed:  Anesthesia Type:  general    Patient's Preference:   Plan Factors:          Intra-op Monitoring Plan:   Intra-op Monitoring Plan Comments:   Post Op Pain Control Plan: multimodal analgesia  Post Op Pain Control Plan Comments:     Induction:    Beta Blocker:         Informed Consent: Patient understands risks and agrees with Anesthesia plan.  Questions answered. Anesthesia consent signed with patient.  ASA Score: 3     Day of Surgery Review of History & Physical:            Ready For Surgery From Anesthesia Perspective.

## 2022-01-25 NOTE — ANESTHESIA PROCEDURE NOTES
Intubation    Date/Time: 1/25/2022 12:43 PM  Performed by: Laura Lozada CRNA  Authorized by: Alejandro Orourke MD     Intubation:     Induction:  Intravenous    Intubated:  Postinduction    Mask Ventilation:  Easy mask    Attempts:  1    Attempted By:  CRNA    Difficult Airway Encountered?: No      Complications:  None    Airway Device:  Supraglottic airway/LMA    Airway Device Size:  4.0    Style/Cuff Inflation:  Cuffed (inflated to minimal occlusive pressure)    Secured at:  The lips    Placement Verified By:  Capnometry    Complicating Factors:  None    Findings Post-Intubation:  BS equal bilateral and atraumatic/condition of teeth unchanged

## 2022-01-26 ENCOUNTER — HOSPITAL ENCOUNTER (OUTPATIENT)
Dept: RADIOLOGY | Facility: OTHER | Age: 67
Discharge: HOME | End: 2022-01-26
Attending: INTERNAL MEDICINE
Payer: MEDICARE

## 2022-01-26 DIAGNOSIS — Z12.31 SCREENING MAMMOGRAM FOR BREAST CANCER: ICD-10-CM

## 2022-01-26 DIAGNOSIS — M85.89 OSTEOPENIA OF MULTIPLE SITES: ICD-10-CM

## 2022-01-26 PROCEDURE — 77067 MAMMO DIGITAL SCREENING BILAT WITH TOMO: ICD-10-PCS | Mod: 26,,, | Performed by: RADIOLOGY

## 2022-01-26 PROCEDURE — 77063 BREAST TOMOSYNTHESIS BI: CPT | Mod: 26,,, | Performed by: RADIOLOGY

## 2022-01-26 PROCEDURE — 77080 DXA BONE DENSITY AXIAL: CPT | Mod: 26,,, | Performed by: RADIOLOGY

## 2022-01-26 PROCEDURE — 77080 DXA BONE DENSITY AXIAL: CPT | Mod: TC

## 2022-01-26 PROCEDURE — 77063 BREAST TOMOSYNTHESIS BI: CPT | Mod: TC

## 2022-01-26 PROCEDURE — 77080 DEXA BONE DENSITY SPINE HIP: ICD-10-PCS | Mod: 26,,, | Performed by: RADIOLOGY

## 2022-01-26 PROCEDURE — 77067 SCR MAMMO BI INCL CAD: CPT | Mod: 26,,, | Performed by: RADIOLOGY

## 2022-01-26 PROCEDURE — 77063 MAMMO DIGITAL SCREENING BILAT WITH TOMO: ICD-10-PCS | Mod: 26,,, | Performed by: RADIOLOGY

## 2022-01-27 ENCOUNTER — PATIENT MESSAGE (OUTPATIENT)
Dept: UROLOGY | Facility: CLINIC | Age: 67
End: 2022-01-27
Payer: MEDICARE

## 2022-01-28 LAB
COMPN STONE: NORMAL
SPECIMEN SOURCE: NORMAL
STONE ANALYSIS IR-IMP: NORMAL

## 2022-01-31 ENCOUNTER — TELEPHONE (OUTPATIENT)
Dept: UROLOGY | Facility: CLINIC | Age: 67
End: 2022-01-31
Payer: MEDICARE

## 2022-01-31 NOTE — TELEPHONE ENCOUNTER
----- Message from Deysi Osman sent at 1/31/2022 11:55 AM CST -----  Thank you. One more question, how long do I have to continue taking the Tamsulosin .4mg? I took the stent out today and now feel a little tenderness in the left front abdomen. How long can I expect that to continue?  Thank you,  Socorro Amaro Pt sent me this message through the portal.

## 2022-02-18 ENCOUNTER — OFFICE VISIT (OUTPATIENT)
Dept: CARDIOLOGY | Facility: CLINIC | Age: 67
End: 2022-02-18
Payer: MEDICARE

## 2022-02-18 VITALS
WEIGHT: 175.94 LBS | BODY MASS INDEX: 27.61 KG/M2 | DIASTOLIC BLOOD PRESSURE: 70 MMHG | HEART RATE: 65 BPM | HEIGHT: 67 IN | SYSTOLIC BLOOD PRESSURE: 156 MMHG

## 2022-02-18 DIAGNOSIS — I21.02 ST ELEVATION MYOCARDIAL INFARCTION INVOLVING LEFT ANTERIOR DESCENDING (LAD) CORONARY ARTERY: Primary | ICD-10-CM

## 2022-02-18 DIAGNOSIS — Z87.74 HISTORY OF PERCUTANEOUS TRANSCATHETER CLOSURE OF CONGENITAL ASD: ICD-10-CM

## 2022-02-18 DIAGNOSIS — I10 PRIMARY HYPERTENSION: ICD-10-CM

## 2022-02-18 PROCEDURE — 99214 PR OFFICE/OUTPT VISIT, EST, LEVL IV, 30-39 MIN: ICD-10-PCS | Mod: S$PBB,,, | Performed by: INTERNAL MEDICINE

## 2022-02-18 PROCEDURE — 99999 PR PBB SHADOW E&M-EST. PATIENT-LVL IV: ICD-10-PCS | Mod: PBBFAC,,, | Performed by: INTERNAL MEDICINE

## 2022-02-18 PROCEDURE — 99999 PR PBB SHADOW E&M-EST. PATIENT-LVL IV: CPT | Mod: PBBFAC,,, | Performed by: INTERNAL MEDICINE

## 2022-02-18 PROCEDURE — 99214 OFFICE O/P EST MOD 30 MIN: CPT | Mod: S$PBB,,, | Performed by: INTERNAL MEDICINE

## 2022-02-18 PROCEDURE — 99214 OFFICE O/P EST MOD 30 MIN: CPT | Mod: PBBFAC | Performed by: INTERNAL MEDICINE

## 2022-02-18 NOTE — PROGRESS NOTES
Subjective:   Patient ID:  Socorro Amaro is a 66 y.o. female who presents for evaluation of No chief complaint on file.    PROBLEM LIST:  Embolic anterior STEMI 6/18 (LAD occlusion)  ASD closure 3/19  HTN    HPI 10/18: She presents today to establish care following an embolic STEMI in her LAD in June while in [place]. She has a history of HTN. She is quite active, hiking regularly. In June she developed acute onset SSCP and was found to be having an anterior STEMI. She was taken to the cath lab emergently and found to have an embolic occlusion of her LAD and no CAD. She was treated with thrombectomy. The event was thought to be secondary to self reported atrial fibrillation, however, she has not actually ever had any documented afib. She does report frequent heart palpitations that only last a few seconds. A 48 hour holter monitor was done earlier this month which was normal. She denies any history of VTE. She was on a 2 day car ride two weeks prior to her event. She flies often. There is no family history of VTE. She did not have any calf pain or swelling prior to the STEMI. She has recovered well. She has not done cardiac rehab and would like to do it at .  Her echo from [place] showed and LVEF of 55-60% with apical hypokinesis.      Interval history:  She has been suffering with calcium oxalate stones and underwent surgery last month.  She is still having some lingering pain in her left lower abdomen and flank.  She also reports suffering with significant reflux.  This has been associated with a cough.  She started taking Prilosec but does not feel like it is helping.  She denies any chest pain, shortness of breath, PND, orthopnea, or heart palpitations.  She has not had any lower extremity edema.  She continues to walk about 3 miles a day and also rides her bike.  She is actively participating in the digital hypertension program, and her mean blood pressure is 137/77, and she is at goal 57% of the time.   Recently her readings have been elevated likely secondary to the pain from her kidney stones.  She also states she has been using a nasal decongestant.  She had recent blood work done with Dr. Cao which was normal.    EC2020 16:46:39: Personally reviewed by me shows NSR 61     Echo :  Summary    · Normal left ventricular systolic function. The estimated ejection fraction is 60%  · Normal right ventricular systolic function.  · Normal LV diastolic function.  · Mild left atrial enlargement.  · Mild mitral regurgitation.  · The estimated PA systolic pressure is 24 mm Hg  · Normal central venous pressure (3 mm Hg).  · There is an atrial septal closure device present with no evidence of residual shunting.    MARINE :  Summary    · Normal left ventricular systolic function. The estimated ejection fraction is 60%  · Normal right ventricular systolic function.  · Normal appearing left atrial appendage. No thrombus is present in the appendage. Abnormal appendage velocities.  · Trace mitral regurgitation.  · Small secundum interatrial septal defect present with left to right shunting indicated by color flow Doppler. No right to left shunt with contrast bubble.  · Normal atrial size.  · Normal LV diastolic function.        Past Medical History:   Diagnosis Date    ASD (atrial septal defect)     HTN (hypertension)     STEMI (ST elevation myocardial infarction) 2018    embolic event    Thyroid disease        Past Surgical History:   Procedure Laterality Date    ASD REPAIR  2019    percutanous ASD closure    CARDIAC CATHETERIZATION      Embolic occlusions of LAD, No other CAD    CORONARY ANGIOPLASTY  2018    LAD thrombectomy in [place]    OOPHORECTOMY  2016    Bilateral    RHINOPLASTY TIP          TONSILLECTOMY, ADENOIDECTOMY         Social History     Socioeconomic History    Marital status:    Tobacco Use    Smoking status: Never Smoker    Smokeless tobacco: Never  Used   Substance and Sexual Activity    Alcohol use: Yes     Alcohol/week: 3.0 standard drinks     Types: 1 Glasses of wine, 1 Cans of beer, 1 Shots of liquor per week     Comment: social    Drug use: Never    Sexual activity: Not Currently     Partners: Male     Birth control/protection: None, See Surgical Hx       Family History   Problem Relation Age of Onset    Arthritis Mother     Cancer Mother 72        spindle cell ca  tongue     Hypertension Mother     Hyperlipidemia Mother     Depression Sister     Hypertension Brother     Depression Sister     Hypertension Father     Heart disease Father     Breast cancer Paternal Aunt     Colon cancer Neg Hx     Ovarian cancer Neg Hx        Patient's Medications   New Prescriptions    No medications on file   Previous Medications    AMLODIPINE (NORVASC) 5 MG TABLET    Take 1 tablet (5 mg total) by mouth once daily. Patient needs to schedule an appointment in the cardiology clinic.    ASPIRIN (ECOTRIN) 81 MG EC TABLET    Take 81 mg by mouth once daily.    CHOLECALCIFEROL, VITAMIN D3, (VITAMIN D3) 25 MCG (1,000 UNIT) CAPSULE    Take 1,000 Units by mouth once daily.     CYANOCOBALAMIN (VITAMIN B-12) 1000 MCG TABLET    Take 5,000 mcg by mouth once daily.    LEVOTHYROXINE (SYNTHROID) 75 MCG TABLET    TAKE 1 TABLET BY MOUTH EVERY DAY BEFORE BREAKFAST    LORATADINE (CLARITIN) 10 MG TABLET    Allerclear 10 mg tablet   Take 1 tablet as needed by oral route.    METOPROLOL SUCCINATE (TOPROL-XL) 25 MG 24 HR TABLET    Take 1 tablet (25 mg total) by mouth once daily.    MULTIVITAMIN CAPSULE    Take 1 capsule by mouth once daily.    OMEPRAZOLE (PRILOSEC) 40 MG CAPSULE    TAKE 1 CAPSULE BY MOUTH EVERY DAY    TAMSULOSIN (FLOMAX) 0.4 MG CAP    Take 2 capsules by mouth once daily.    TAMSULOSIN (FLOMAX) 0.4 MG CAP    Take 1 capsule (0.4 mg total) by mouth once daily.   Modified Medications    No medications on file   Discontinued Medications    ALPRAZOLAM (XANAX) 0.25 MG  TABLET    TAKE 1 TABLET BY MOUTH 1 hour before flight    CO-ENZYME Q-10 30 MG CAPSULE    Take 300 mg by mouth once daily.    LOTEPREDNOL (ALREX) 0.2 % DRPS    once daily.     NAPROXEN SODIUM (ANAPROX) 220 MG TABLET    Take 200 mg by mouth 2 (two) times daily with meals.    OLOPATADINE (PATANOL) 0.1 % OPHTHALMIC SOLUTION    Place 1 drop into both eyes 2 (two) times daily.    ONDANSETRON (ZOFRAN) 8 MG TABLET    Take 1 tablet (8 mg total) by mouth every 8 (eight) hours as needed for Nausea.    OXYBUTYNIN (DITROPAN) 5 MG TAB    Take 1 tablet (5 mg total) by mouth 3 (three) times daily as needed (bladder spasms).    PHOSPHATIDYL SERINE, BULK, MISC    1 capsule by Misc.(Non-Drug; Combo Route) route once daily. 150 mg per capsule    PRASTERONE, DHEA, (DHEA ORAL)    Take by mouth once daily. 50 mg    TAMSULOSIN (FLOMAX) 0.4 MG CAP    TAKE 2 CAPSULES BY MOUTH EVERY DAY AT BEDTIME       Review of Systems   Constitutional: Negative for malaise/fatigue and weight gain.   HENT: Negative for hearing loss.    Eyes: Negative for visual disturbance.   Cardiovascular: Negative for chest pain, claudication, dyspnea on exertion, leg swelling, near-syncope, orthopnea, palpitations, paroxysmal nocturnal dyspnea and syncope.   Respiratory: Positive for cough. Negative for shortness of breath, sleep disturbances due to breathing, snoring and wheezing.    Endocrine: Negative for cold intolerance, heat intolerance, polydipsia, polyphagia and polyuria.   Hematologic/Lymphatic: Negative for bleeding problem. Does not bruise/bleed easily.   Skin: Negative for rash and suspicious lesions.   Musculoskeletal: Negative for arthritis, falls, joint pain, muscle weakness and myalgias.   Gastrointestinal: Positive for abdominal pain and heartburn. Negative for change in bowel habit, constipation, diarrhea, hematochezia, melena and nausea.   Genitourinary: Negative for hematuria and nocturia.   Neurological: Negative for excessive daytime sleepiness,  dizziness, headaches, light-headedness, loss of balance and weakness.   Psychiatric/Behavioral: Negative for depression. The patient is not nervous/anxious.    Allergic/Immunologic: Negative for environmental allergies.       There were no vitals taken for this visit.    Objective:   Physical Exam  Constitutional:       Appearance: She is well-developed.      Comments:      HENT:      Head: Normocephalic and atraumatic.   Eyes:      General: No scleral icterus.     Conjunctiva/sclera: Conjunctivae normal.      Pupils: Pupils are equal, round, and reactive to light.   Neck:      Thyroid: No thyromegaly.      Vascular: No hepatojugular reflux or JVD.      Trachea: No tracheal deviation.   Cardiovascular:      Rate and Rhythm: Regular rhythm. Bradycardia present.      Chest Wall: PMI is not displaced.      Pulses: Intact distal pulses.           Carotid pulses are 2+ on the right side and 2+ on the left side.       Radial pulses are 2+ on the right side and 2+ on the left side.        Dorsalis pedis pulses are 2+ on the right side and 2+ on the left side.        Posterior tibial pulses are 2+ on the right side and 2+ on the left side.      Heart sounds: Normal heart sounds.   Pulmonary:      Effort: Pulmonary effort is normal.      Breath sounds: Normal breath sounds.   Abdominal:      General: Bowel sounds are normal. There is no distension.      Palpations: Abdomen is soft. There is no mass.      Tenderness: There is no abdominal tenderness.   Musculoskeletal:         General: No tenderness.      Cervical back: Normal range of motion and neck supple.   Lymphadenopathy:      Cervical: No cervical adenopathy.   Skin:     General: Skin is warm and dry.      Findings: No erythema or rash.      Nails: There is no clubbing.   Neurological:      Mental Status: She is alert and oriented to person, place, and time.   Psychiatric:         Speech: Speech normal.         Behavior: Behavior normal.         Lab Results   Component  Value Date     01/10/2022    K 4.4 01/10/2022     01/10/2022    CO2 26 01/10/2022    BUN 20 01/10/2022    CREATININE 0.9 01/10/2022    GLU 62 (L) 01/10/2022    MG 2.0 10/22/2018    AST 17 01/10/2022    ALT 16 01/10/2022    ALBUMIN 3.9 01/10/2022    PROT 6.6 01/10/2022    BILITOT 0.6 01/10/2022    WBC 3.84 (L) 01/10/2022    HGB 13.2 01/10/2022    HCT 41.6 01/10/2022    MCV 89 01/10/2022     01/10/2022    INR 1.1 03/07/2019    TSH 1.539 05/11/2021    CHOL 179 06/12/2020    HDL 84 (H) 06/12/2020    LDLCALC 86.2 06/12/2020    TRIG 44 06/12/2020       Assessment:     1. Essential hypertension :  Her recent blood pressure reached was have been a little bit high.  We discussed discontinuing her Afrin nasal spray.  If her blood pressure continues to remain above goal once her kidney stones are resolved, we discussed increasing her amlodipine to 10 mg daily. Limit sodium intake to < 1500mg daily and follow the DASH diet plan.     2. ST elevation myocardial infarction involving left anterior descending (LAD) coronary artery :  She is asymptomatic and remains on metoprolol.  Her event was embolic, and her lipids are at goal, therefore she is not on statin therapy.   3.     S/P ASD closure:  Continue aspirin.  There is no residual shunting on her last echocardiogram.    Plan:     Diagnoses and all orders for this visit:    ST elevation myocardial infarction involving left anterior descending (LAD) coronary artery    History of percutaneous transcatheter closure of congenital ASD    Primary hypertension        Thank you for allowing me to participate in this patient's care. Please do not hesitate to contact me with any questions or concerns.

## 2022-02-23 ENCOUNTER — HOSPITAL ENCOUNTER (OUTPATIENT)
Dept: RADIOLOGY | Facility: OTHER | Age: 67
Discharge: HOME | End: 2022-02-23
Attending: STUDENT IN AN ORGANIZED HEALTH CARE EDUCATION/TRAINING PROGRAM
Payer: MEDICARE

## 2022-02-23 ENCOUNTER — OFFICE VISIT (OUTPATIENT)
Dept: UROLOGY | Facility: CLINIC | Age: 67
End: 2022-02-23
Payer: MEDICARE

## 2022-02-23 VITALS
BODY MASS INDEX: 27.47 KG/M2 | HEART RATE: 74 BPM | DIASTOLIC BLOOD PRESSURE: 83 MMHG | WEIGHT: 175 LBS | HEIGHT: 67 IN | SYSTOLIC BLOOD PRESSURE: 142 MMHG

## 2022-02-23 DIAGNOSIS — N22 CALCULUS OF URINARY TRACT IN DISEASES CLASSIFIED ELSEWHERE: ICD-10-CM

## 2022-02-23 DIAGNOSIS — N20.0 NEPHROLITHIASIS: Primary | ICD-10-CM

## 2022-02-23 PROCEDURE — 76770 US EXAM ABDO BACK WALL COMP: CPT | Mod: 26,,, | Performed by: INTERNAL MEDICINE

## 2022-02-23 PROCEDURE — 76770 US KIDNEY: ICD-10-PCS | Mod: 26,,, | Performed by: INTERNAL MEDICINE

## 2022-02-23 PROCEDURE — 99213 PR OFFICE/OUTPT VISIT, EST, LEVL III, 20-29 MIN: ICD-10-PCS | Mod: S$GLB,,, | Performed by: NURSE PRACTITIONER

## 2022-02-23 PROCEDURE — 76770 US EXAM ABDO BACK WALL COMP: CPT | Mod: TC

## 2022-02-23 PROCEDURE — 99213 OFFICE O/P EST LOW 20 MIN: CPT | Mod: S$GLB,,, | Performed by: NURSE PRACTITIONER

## 2022-02-23 NOTE — PROGRESS NOTES
"Subjective:      Socorro Amaro is a 66 y.o. female who returns today regarding her nephrolithiasis.    The patient is s/p left URS with LL and stent placement with Dr. Stanley on 1/25/22. She removed her stent as instructed. Stone analysis - "70% Calcium oxalate monohydrate 20% Calcium phosphate (apatite) 10% Calcium oxalate dihydrate."    Returns today with GLORIA that shows the complete resolution of hydronephrosis. Denies bothersome urinary symptoms. Continues to report LLQ abdominal pain that sometimes radiates to her upper leg. She describes this as a burning pain.     The following portions of the patient's history were reviewed and updated as appropriate: allergies, current medications, past family history, past medical history, past social history, past surgical history and problem list.    Review of Systems  Constitutional: no fever or chills  ENT: no nasal congestion or sore throat  Respiratory: no cough or shortness of breath  Cardiovascular: no chest pain or palpitations  Gastrointestinal: no nausea or vomiting, tolerating diet  Genitourinary: as per HPI  Hematologic/Lymphatic: no easy bruising or lymphadenopathy  Musculoskeletal: no arthralgias or myalgias  Neurological: no seizures or tremors  Behavioral/Psych: no auditory or visual hallucinations     Objective:   Vital Signs:BP (!) 142/83   Pulse 74   Ht 5' 7" (1.702 m)   Wt 79.4 kg (175 lb)   BMI 27.41 kg/m²     Physical Exam   General: alert and oriented, no acute distress  Head: normocephalic, atraumatic  Neck: supple, normal ROM  Respiratory: Symmetric expansion, non-labored breathing  Cardiovascular: regular rate and rhythm  Abdomen: soft, non tender, non distended  Pelvic: deferred   Skin: normal coloration and turgor, no rashes, no suspicious skin lesions noted  Neuro: alert and oriented x3, no gross deficits  Psych: normal judgment and insight, normal mood/affect and non-anxious  No CVA tenderness    Lab Review   Urinalysis demonstrates " "negative for all components  Lab Results   Component Value Date    WBC 3.84 (L) 01/10/2022    HGB 13.2 01/10/2022    HCT 41.6 01/10/2022    MCV 89 01/10/2022     01/10/2022     Lab Results   Component Value Date    CREATININE 0.9 01/10/2022    BUN 20 01/10/2022       Imaging   (all images personally reviewed; agree with report below)  GLORIA -"Right kidney: The right kidney measures 10.6 cm.  The resistive index is 0.61, normal.  There is no hydronephrosis, renal mass or calculus. Left kidney: The left kidney measures 9.8 cm.  The resistive index is 0.64.  There is no hydronephrosis, mass or calculus"  Assessment:     1. Nephrolithiasis      Plan:   Socorro was seen today for follow-up.    Diagnoses and all orders for this visit:    Nephrolithiasis  -     X-Ray KUB; Future      Plan:  --UA and GLORIA are normal!  --Recommend general stone preventive measures, to include increased hydration, low Na diet, and increased citrus intake  --Follow up in 1 year with KUB   --Follow up with PCP regarding her pain     "

## 2022-04-27 ENCOUNTER — PATIENT MESSAGE (OUTPATIENT)
Dept: CARDIOLOGY | Facility: CLINIC | Age: 67
End: 2022-04-27
Payer: MEDICARE

## 2022-08-03 NOTE — PROGRESS NOTES
Digital Medicine: Clinician Follow-Up    Called patient to follow-up on her high blood pressure.  She missed readings since she is in [place] and left her cuff in [place]. She ordered a new cuff for her home in [place] and has now resumed readings.    The history is provided by the patient.   Follow-up reason(s): routine follow up.     Patient is not experiencing signs/symptoms of hypotension.  Patient is not experiencing signs/symptoms of hypertension.          Last 5 Patient Entered Readings                                      Current 30 Day Average: 148/85     Recent Readings 8/5/2020 8/5/2020 8/4/2020 6/28/2020 6/26/2020    SBP (mmHg) 131 120 135 127 129    DBP (mmHg) 80 80 83 69 78    Pulse 61 80 63 61 68             Screenings    ASSESSMENT(S)  Patients BP average is 148/85 mmHg, which is at goal. Patient's BP goal is less than or equal to 130/80 per 2017 ACC/AHA Hypertension Guidelines.      Hypertension Plan  Continue current therapy.  Provided patient education. Discussed hypotension symptoms and when to reach out to us.        Addressed any questions or concerns and patient has my contact information if needed prior to next outreach. Patient verbalizes understanding.            There are no preventive care reminders to display for this patient.      Hypertension Medications                  losartan (COZAAR) 50 MG tablet TAKE 1 TABLET BY MOUTH EVERY DAY    metoprolol succinate (TOPROL-XL) 25 MG 24 hr tablet TAKE 1 TABLET BY MOUTH once DAILY                 Discharged

## 2022-09-12 ENCOUNTER — APPOINTMENT (RX ONLY)
Dept: URBAN - NONMETROPOLITAN AREA CLINIC 25 | Facility: CLINIC | Age: 67
Setting detail: DERMATOLOGY
End: 2022-09-12

## 2022-09-12 DIAGNOSIS — L82.1 OTHER SEBORRHEIC KERATOSIS: ICD-10-CM

## 2022-09-12 DIAGNOSIS — L57.8 OTHER SKIN CHANGES DUE TO CHRONIC EXPOSURE TO NONIONIZING RADIATION: ICD-10-CM

## 2022-09-12 DIAGNOSIS — D22 MELANOCYTIC NEVI: ICD-10-CM

## 2022-09-12 PROBLEM — D22.4 MELANOCYTIC NEVI OF SCALP AND NECK: Status: ACTIVE | Noted: 2022-09-12

## 2022-09-12 PROBLEM — D22.5 MELANOCYTIC NEVI OF TRUNK: Status: ACTIVE | Noted: 2022-09-12

## 2022-09-12 PROCEDURE — 99213 OFFICE O/P EST LOW 20 MIN: CPT

## 2022-09-12 PROCEDURE — ? COUNSELING

## 2022-09-12 PROCEDURE — ? SUNSCREEN RECOMMENDATIONS

## 2022-09-12 ASSESSMENT — LOCATION ZONE DERM
LOCATION ZONE: NECK
LOCATION ZONE: FACE
LOCATION ZONE: TRUNK

## 2022-09-12 ASSESSMENT — LOCATION SIMPLE DESCRIPTION DERM
LOCATION SIMPLE: LEFT UPPER BACK
LOCATION SIMPLE: POSTERIOR NECK
LOCATION SIMPLE: RIGHT CHEEK

## 2022-09-12 ASSESSMENT — LOCATION DETAILED DESCRIPTION DERM
LOCATION DETAILED: LEFT MID-UPPER BACK
LOCATION DETAILED: RIGHT INFERIOR CENTRAL MALAR CHEEK
LOCATION DETAILED: LEFT MEDIAL TRAPEZIAL NECK

## 2022-12-05 ENCOUNTER — PATIENT MESSAGE (OUTPATIENT)
Dept: ADMINISTRATIVE | Facility: OTHER | Age: 67
End: 2022-12-05
Payer: MEDICARE

## 2022-12-06 ENCOUNTER — APPOINTMENT (OUTPATIENT)
Dept: RADIOLOGY | Facility: HOSPITAL | Age: 67
End: 2022-12-06
Attending: INTERNAL MEDICINE
Payer: MEDICARE

## 2022-12-06 DIAGNOSIS — R05.9 COUGH, UNSPECIFIED TYPE: ICD-10-CM

## 2022-12-06 PROCEDURE — 71046 X-RAY EXAM CHEST 2 VIEWS: CPT | Mod: 26,,, | Performed by: RADIOLOGY

## 2022-12-06 PROCEDURE — 71046 X-RAY EXAM CHEST 2 VIEWS: CPT | Mod: TC,FY,PN

## 2022-12-06 PROCEDURE — 71046 XR CHEST PA AND LATERAL: ICD-10-PCS | Mod: 26,,, | Performed by: RADIOLOGY

## 2023-03-16 ENCOUNTER — APPOINTMENT (OUTPATIENT)
Dept: RADIOLOGY | Facility: HOSPITAL | Age: 68
End: 2023-03-16
Attending: NURSE PRACTITIONER
Payer: MEDICARE

## 2023-03-16 DIAGNOSIS — N20.0 NEPHROLITHIASIS: ICD-10-CM

## 2023-03-16 PROCEDURE — 74018 RADEX ABDOMEN 1 VIEW: CPT | Mod: 26,,, | Performed by: INTERNAL MEDICINE

## 2023-03-16 PROCEDURE — 74018 XR KUB: ICD-10-PCS | Mod: 26,,, | Performed by: INTERNAL MEDICINE

## 2023-03-16 PROCEDURE — 74018 RADEX ABDOMEN 1 VIEW: CPT | Mod: TC,FY,PN

## 2023-03-17 ENCOUNTER — OFFICE VISIT (OUTPATIENT)
Dept: UROLOGY | Facility: CLINIC | Age: 68
End: 2023-03-17
Payer: MEDICARE

## 2023-03-17 VITALS
RESPIRATION RATE: 16 BRPM | SYSTOLIC BLOOD PRESSURE: 123 MMHG | BODY MASS INDEX: 27.47 KG/M2 | WEIGHT: 175 LBS | OXYGEN SATURATION: 96 % | HEART RATE: 64 BPM | HEIGHT: 67 IN | DIASTOLIC BLOOD PRESSURE: 62 MMHG

## 2023-03-17 DIAGNOSIS — Z87.442 HISTORY OF NEPHROLITHIASIS: ICD-10-CM

## 2023-03-17 DIAGNOSIS — R10.32 LEFT LOWER QUADRANT ABDOMINAL PAIN: Primary | ICD-10-CM

## 2023-03-17 PROCEDURE — 99213 OFFICE O/P EST LOW 20 MIN: CPT | Mod: S$GLB,,, | Performed by: NURSE PRACTITIONER

## 2023-03-17 PROCEDURE — 99213 PR OFFICE/OUTPT VISIT, EST, LEVL III, 20-29 MIN: ICD-10-PCS | Mod: S$GLB,,, | Performed by: NURSE PRACTITIONER

## 2023-03-17 RX ORDER — LORAZEPAM 1 MG/1
TABLET ORAL
COMMUNITY
Start: 2023-02-10 | End: 2023-12-06 | Stop reason: SDUPTHER

## 2023-03-17 NOTE — PROGRESS NOTES
"Subjective:      Socorro Amaro is a 67 y.o. female who returns today regarding her nephrolithiasis.    The patient has a history of stones. S/p URS in 2022 per Dr. Stanley. Stone analysis - "70% Calcium oxalate monohydrate 20% Calcium phosphate (apatite) 10% Calcium oxalate dihydrate."    She presents today with surveillance KUB.     Denies bothersome urinary symptoms. Denies hematuria. Continues to report occasional LLQ abdominal pain with some radiation to the groin/down her leg for several years. There are no aggravating/alleviating factors. 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 Denies constipation.     The following portions of the patient's history were reviewed and updated as appropriate: allergies, current medications, past family history, past medical history, past social history, past surgical history and problem list.    Review of Systems  Constitutional: no fever or chills  ENT: no nasal congestion or sore throat  Respiratory: no cough or shortness of breath  Cardiovascular: no chest pain or palpitations  Gastrointestinal: no nausea or vomiting, tolerating diet  Genitourinary: as per HPI  Hematologic/Lymphatic: no easy bruising or lymphadenopathy  Musculoskeletal: no arthralgias or myalgias  Neurological: no seizures or tremors  Behavioral/Psych: no auditory or visual hallucinations     Objective:   Vital Signs:  Vitals:    03/17/23 1256   BP: 123/62   Pulse: 64   Resp: 16     Physical Exam   General: alert and oriented, no acute distress  Head: normocephalic, atraumatic  Neck: supple, normal ROM  Respiratory: Symmetric expansion, non-labored breathing  Cardiovascular: regular rate and rhythm  Abdomen: soft, non tender, non distended  Pelvic: deferred  Skin: normal coloration and turgor, no rashes, no suspicious skin lesions noted  Neuro: alert and oriented x3, no gross deficits  Psych: normal judgment and insight, normal mood/affect, and non-anxious    Lab " "Review   Urinalysis demonstrates negative for all components  Lab Results   Component Value Date    WBC 3.84 (L) 01/10/2022    HGB 13.2 01/10/2022    HCT 41.6 01/10/2022    MCV 89 01/10/2022     01/10/2022     Lab Results   Component Value Date    CREATININE 0.9 01/10/2022    BUN 20 01/10/2022       Imaging   (all images personally reviewed; agree with report below)  KUB- "No calcifications projecting over the kidneys, expected course of the ureters, or bladder.  Nonobstructive bowel gas pattern.  Occlusion device in the mediastinum.  Degenerative changes in the spine and pelvis."    Assessment:     1. Left lower quadrant abdominal pain    2. History of nephrolithiasis      Plan:   Socorro was seen today for nephrolithiasis.    Diagnoses and all orders for this visit:    Left lower quadrant abdominal pain    History of nephrolithiasis  -     X-Ray KUB; Future    Plan:  --UA negative and no stones on x-ray  --Recommend general preventive measures, to include increased hydration, low Na diet, and increased citrus intake  --KUB Q2 years   --Follow up with PCP regarding abdominal discomfort           " Repair Type: No repair - secondary intention

## 2023-03-17 NOTE — Clinical Note
Dr. Rice I saw Ms. Amaro today for stone follow up. KUB and urine looked great. She continues to report a discomfort in her LLQ with some radiation into her leg for several years. 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 Also mother with some sort of bile duct/pancreatic cancer. She seems concerned and I just wasn't sure the next step for workup.  Talya

## 2023-03-28 ENCOUNTER — PATIENT OUTREACH (OUTPATIENT)
Dept: ADMINISTRATIVE | Facility: HOSPITAL | Age: 68
End: 2023-03-28
Payer: MEDICARE

## 2023-03-28 NOTE — PROGRESS NOTES
[facility] CMS chart audits/COLON CANCER SCREENING. Chart review completed for  test overdue (mammograms, Colonoscopies, pap smears, DM labs, and/or EYE EXAMs)      Care Everywhere and media, updates requested and reviewed.      HM updated with external COLONOSCOPY report.

## 2023-04-24 RX ORDER — LISINOPRIL 5 MG/1
5 TABLET ORAL NIGHTLY
Qty: 90 TABLET | Refills: 3 | Status: SHIPPED | OUTPATIENT
Start: 2023-04-24 | End: 2023-04-27

## 2023-04-27 ENCOUNTER — OFFICE VISIT (OUTPATIENT)
Dept: CARDIOLOGY | Facility: CLINIC | Age: 68
End: 2023-04-27
Payer: MEDICARE

## 2023-04-27 VITALS — HEIGHT: 67 IN | BODY MASS INDEX: 29.1 KG/M2 | OXYGEN SATURATION: 96 % | HEART RATE: 89 BPM | WEIGHT: 185.44 LBS

## 2023-04-27 DIAGNOSIS — I25.2 HISTORY OF ST ELEVATION MYOCARDIAL INFARCTION (STEMI): ICD-10-CM

## 2023-04-27 DIAGNOSIS — I10 ESSENTIAL HYPERTENSION: Primary | ICD-10-CM

## 2023-04-27 DIAGNOSIS — Z87.74 HISTORY OF PERCUTANEOUS TRANSCATHETER CLOSURE OF CONGENITAL ASD: ICD-10-CM

## 2023-04-27 DIAGNOSIS — R07.9 CHEST PAIN, UNSPECIFIED TYPE: ICD-10-CM

## 2023-04-27 DIAGNOSIS — R00.2 PALPITATIONS: ICD-10-CM

## 2023-04-27 PROCEDURE — 99999 PR PBB SHADOW E&M-EST. PATIENT-LVL V: CPT | Mod: PBBFAC,,, | Performed by: PHYSICIAN ASSISTANT

## 2023-04-27 PROCEDURE — 99215 OFFICE O/P EST HI 40 MIN: CPT | Mod: PBBFAC | Performed by: PHYSICIAN ASSISTANT

## 2023-04-27 PROCEDURE — 99999 PR PBB SHADOW E&M-EST. PATIENT-LVL V: ICD-10-PCS | Mod: PBBFAC,,, | Performed by: PHYSICIAN ASSISTANT

## 2023-04-27 PROCEDURE — 99214 PR OFFICE/OUTPT VISIT, EST, LEVL IV, 30-39 MIN: ICD-10-PCS | Mod: S$PBB,,, | Performed by: PHYSICIAN ASSISTANT

## 2023-04-27 PROCEDURE — 99214 OFFICE O/P EST MOD 30 MIN: CPT | Mod: S$PBB,,, | Performed by: PHYSICIAN ASSISTANT

## 2023-04-27 RX ORDER — LISINOPRIL 5 MG/1
5 TABLET ORAL NIGHTLY
Qty: 90 TABLET | Refills: 3 | Status: SHIPPED | OUTPATIENT
Start: 2023-04-27 | End: 2023-09-19 | Stop reason: DRUGHIGH

## 2023-04-27 RX ORDER — AMLODIPINE BESYLATE 10 MG/1
10 TABLET ORAL DAILY
Qty: 90 TABLET | Refills: 3 | Status: SHIPPED | OUTPATIENT
Start: 2023-04-27 | End: 2024-04-21

## 2023-04-27 NOTE — PATIENT INSTRUCTIONS
Continue to monitor blood pressure at home. If blood pressure starts to stay higher than 130/80 consistently, start taking Lisinopril 5 mg daily.

## 2023-05-25 ENCOUNTER — HOSPITAL ENCOUNTER (OUTPATIENT)
Dept: RADIOLOGY | Facility: HOSPITAL | Age: 68
Discharge: HOME | End: 2023-05-25
Attending: INTERNAL MEDICINE
Payer: MEDICARE

## 2023-05-25 DIAGNOSIS — Z12.31 SCREENING MAMMOGRAM FOR BREAST CANCER: ICD-10-CM

## 2023-05-25 PROCEDURE — 77067 SCR MAMMO BI INCL CAD: CPT | Mod: 26,,, | Performed by: RADIOLOGY

## 2023-05-25 PROCEDURE — 77067 SCR MAMMO BI INCL CAD: CPT | Mod: TC

## 2023-05-25 PROCEDURE — 77067 MAMMO DIGITAL SCREENING BILAT WITH TOMO: ICD-10-PCS | Mod: 26,,, | Performed by: RADIOLOGY

## 2023-05-25 PROCEDURE — 77063 BREAST TOMOSYNTHESIS BI: CPT | Mod: 26,,, | Performed by: RADIOLOGY

## 2023-05-25 PROCEDURE — 77063 MAMMO DIGITAL SCREENING BILAT WITH TOMO: ICD-10-PCS | Mod: 26,,, | Performed by: RADIOLOGY

## 2023-05-26 ENCOUNTER — RESEARCH ENCOUNTER (OUTPATIENT)
Dept: RESEARCH | Facility: OTHER | Age: 68
End: 2023-05-26
Payer: MEDICARE

## 2023-05-26 NOTE — RESEARCH
Sponsor: [employer]     Study Title/IRB Number: [id number]    Principle Investigator: Dr. Chase Villanueva    Patient eligibility was checked prior to enrollment in the study. Patient met the following inclusion and exclusion criteria:     INCLUSION CRITERIA  Participant age is 50 years or older at the time of signing the Informed Consent form  Participant is capable of giving signed Informed Consent, which includes compliance with the requirements and restrictions in the informed consent form and the protocol    EXCLUSION CRITERIA  Participant is undergoing or referred for diagnostic evaluation due to clinical suspicion for cancer  Participant has a personal history of invasive or hematologic malignancy, diagnosed within the last 3 years prior to expected enrollment date or diagnosed greater than 3 years prior to expected enrollment date and never treated  Participant has had definitive treatment for invasive or hematologic malignancy within the 3 years prior to expected enrollment date  Participant is not able to comply with protocol procedures  Participant is not a current patient at a participating center  Participant is currently enrolled or was previously enrolled in another [employer]-sponsored study  Participant is current or previous employee/contractor of [employer]  Participant is currently pregnant (by participant's self-report of pregnancy status)    Prior to the Informed Consent (IC) being signed, or any study protocol required data collection, testing, procedure, or intervention being performed, the following was done and/or discussed:  Patient was given a copy of the IC for review   Purpose of the study and qualifications to participate   Study design, Follow up schedule, and tests or procedures done at each visit  Confidentiality and HIPAA Authorization for Release of Medical Records for the research trial/ subject's rights/research related injury  Risk, Benefits, Alternative Treatments, Compensation and  "Costs  Participation in the research trial is voluntary and patient may withdraw at anytime  Contact information for study related questions    Patient verbalizes understanding of the above: Yes  Contact information for CRC and PI given to patient: Yes  Patient able to adequately summarize: the purpose of the study, the risks associated with the study, and all procedures, testing, and follow-ups associated with the study: Yes    Patient signed the informed consent form for the research study with an IRB approval date of 4/25/2022. Each page of the consent form was reviewed with patient and all questions answered satisfactorily.   Patient received a copy of the consent form. The original consent was scanned into electronic medical records.    Anthropometric Data    Participant is a 67 y.o., White, Not  or /a, female  Participant height: 5' 6"     Participant weight: 184 lbs      Smoking History and Alcohol use     Please indicate whether the participant smoked at least 100 cigarettes in their lifetime:   No  Please indicate whether the participant is a current smoker:  No  Age participant started smoking cigarettes: Not Applicable  Age participant stopped smoking cigarettes: Not Applicable  During their time as a smoker, please indicate if the participant stopped smoking for a month or more: Not Applicable  Please indicate how many cigarettes per day did the participant smoke on average for the majority of their time as a smoker: Blank single: Not Applicable    During the last 12 months, how often did the participant have any kind of drink containing alcohol? Count as one drink a 12 ounce can or glass of beer, a 5 ounce glass of wine, or a drink containing 1 shot of liquor. 6 times a week}  During the last 12 months, how many drinks did the participant have on days when they drank alcohol?2 drinks per day    Blood Draw    Following IC being signed and prior to blood draw, patient completed all " baseline/pretest questionnaires. The following specimens were collected from the pt at the time of this encounter via peripheral blood draw.    Blood draw location: Left Arm  Needle used: 21 gauge butterfly needle  Blood draw amount: 40ml  Blood draw time: 12:20pm 5/26/2023

## 2023-06-09 ENCOUNTER — RESEARCH ENCOUNTER (OUTPATIENT)
Dept: RESEARCH | Facility: OTHER | Age: 68
End: 2023-06-09
Payer: MEDICARE

## 2023-06-09 NOTE — PROGRESS NOTES
Telly Pathfinder 2022.003    Telly ID: [id number]       Results the [facility] Multi Cancer Early Detection test and were reviewed by PI Dr Villanueva. Patient was called and notified of test results, there was no signal detected.    Patient reminded, this was a screening test, so we still highly encourage them to continue other normal health screenings  and to continue to adhere to guideline-recommended cancer screening.    Patient was asked about completing 30 day questionnaire online and was agreeable.      
room air
room air

## 2023-09-19 ENCOUNTER — APPOINTMENT (RX ONLY)
Dept: URBAN - NONMETROPOLITAN AREA CLINIC 25 | Facility: CLINIC | Age: 68
Setting detail: DERMATOLOGY
End: 2023-09-19

## 2023-09-19 DIAGNOSIS — L57.8 OTHER SKIN CHANGES DUE TO CHRONIC EXPOSURE TO NONIONIZING RADIATION: ICD-10-CM

## 2023-09-19 DIAGNOSIS — D22 MELANOCYTIC NEVI: ICD-10-CM

## 2023-09-19 DIAGNOSIS — L82.1 OTHER SEBORRHEIC KERATOSIS: ICD-10-CM

## 2023-09-19 DIAGNOSIS — L28.0 LICHEN SIMPLEX CHRONICUS: ICD-10-CM

## 2023-09-19 DIAGNOSIS — L81.4 OTHER MELANIN HYPERPIGMENTATION: ICD-10-CM

## 2023-09-19 DIAGNOSIS — Z80.8 FAMILY HISTORY OF MALIGNANT NEOPLASM OF OTHER ORGANS OR SYSTEMS: ICD-10-CM

## 2023-09-19 DIAGNOSIS — R20.2 PARESTHESIA OF SKIN: ICD-10-CM

## 2023-09-19 PROBLEM — D22.5 MELANOCYTIC NEVI OF TRUNK: Status: ACTIVE | Noted: 2023-09-19

## 2023-09-19 PROBLEM — D23.61 OTHER BENIGN NEOPLASM OF SKIN OF RIGHT UPPER LIMB, INCLUDING SHOULDER: Status: ACTIVE | Noted: 2023-09-19

## 2023-09-19 PROCEDURE — ? COUNSELING

## 2023-09-19 PROCEDURE — ? SUNSCREEN RECOMMENDATIONS

## 2023-09-19 PROCEDURE — 99214 OFFICE O/P EST MOD 30 MIN: CPT

## 2023-09-19 PROCEDURE — ? PRESCRIPTION

## 2023-09-19 PROCEDURE — ? PATIENT SPECIFIC COUNSELING

## 2023-09-19 RX ORDER — CLOBETASOL PROPIONATE 0.5 MG/G
CREAM TOPICAL BID
Qty: 60 | Refills: 0 | Status: ERX | COMMUNITY
Start: 2023-09-19

## 2023-09-19 RX ADMIN — CLOBETASOL PROPIONATE: 0.5 CREAM TOPICAL at 00:00

## 2023-09-19 ASSESSMENT — LOCATION DETAILED DESCRIPTION DERM
LOCATION DETAILED: RIGHT MEDIAL UPPER BACK
LOCATION DETAILED: LEFT ANTERIOR PROXIMAL THIGH
LOCATION DETAILED: RIGHT SUPERIOR UPPER BACK
LOCATION DETAILED: LEFT MID-UPPER BACK
LOCATION DETAILED: SUPERIOR THORACIC SPINE
LOCATION DETAILED: LEFT PROXIMAL PRETIBIAL REGION

## 2023-09-19 ASSESSMENT — LOCATION SIMPLE DESCRIPTION DERM
LOCATION SIMPLE: LEFT PRETIBIAL REGION
LOCATION SIMPLE: UPPER BACK
LOCATION SIMPLE: LEFT THIGH
LOCATION SIMPLE: RIGHT UPPER BACK
LOCATION SIMPLE: LEFT UPPER BACK

## 2023-09-19 ASSESSMENT — LOCATION ZONE DERM
LOCATION ZONE: LEG
LOCATION ZONE: TRUNK

## 2023-11-06 ENCOUNTER — HOSPITAL ENCOUNTER (OUTPATIENT)
Dept: RADIOLOGY | Facility: OTHER | Age: 68
Discharge: HOME | End: 2023-11-06
Attending: INTERNAL MEDICINE
Payer: MEDICARE

## 2023-11-06 DIAGNOSIS — R10.9 ABDOMINAL PAIN, UNSPECIFIED ABDOMINAL LOCATION: ICD-10-CM

## 2023-11-06 PROCEDURE — 74176 CT RENAL STONE STUDY ABD PELVIS WO: ICD-10-PCS | Mod: 26,,, | Performed by: RADIOLOGY

## 2023-11-06 PROCEDURE — 74176 CT ABD & PELVIS W/O CONTRAST: CPT | Mod: TC

## 2023-11-06 PROCEDURE — 74176 CT ABD & PELVIS W/O CONTRAST: CPT | Mod: 26,,, | Performed by: RADIOLOGY

## 2023-11-08 ENCOUNTER — PATIENT MESSAGE (OUTPATIENT)
Dept: ADMINISTRATIVE | Facility: OTHER | Age: 68
End: 2023-11-08
Payer: MEDICARE

## 2024-01-22 ENCOUNTER — LAB VISIT (OUTPATIENT)
Dept: LAB | Facility: OTHER | Age: 69
End: 2024-01-22
Attending: INTERNAL MEDICINE
Payer: MEDICARE

## 2024-01-22 DIAGNOSIS — M85.89 OTHER SPECIFIED DISORDERS OF BONE DENSITY AND STRUCTURE, MULTIPLE SITES: ICD-10-CM

## 2024-01-22 DIAGNOSIS — K31.83 ACHLORHYDRIA: ICD-10-CM

## 2024-01-22 DIAGNOSIS — E87.5 HIGH POTASSIUM: ICD-10-CM

## 2024-01-22 LAB
POTASSIUM SERPL-SCNC: 4.9 MMOL/L (ref 3.5–5.1)
VIT B12 SERPL-MCNC: 502 PG/ML (ref 210–950)

## 2024-01-22 PROCEDURE — 84132 ASSAY OF SERUM POTASSIUM: CPT | Performed by: INTERNAL MEDICINE

## 2024-01-22 PROCEDURE — 36415 COLL VENOUS BLD VENIPUNCTURE: CPT | Performed by: INTERNAL MEDICINE

## 2024-01-22 PROCEDURE — 82607 VITAMIN B-12: CPT | Performed by: INTERNAL MEDICINE

## 2024-01-29 ENCOUNTER — HOSPITAL ENCOUNTER (OUTPATIENT)
Dept: RADIOLOGY | Facility: CLINIC | Age: 69
Discharge: HOME | End: 2024-01-29
Attending: INTERNAL MEDICINE
Payer: MEDICARE

## 2024-01-29 DIAGNOSIS — M85.89 OTHER SPECIFIED DISORDERS OF BONE DENSITY AND STRUCTURE, MULTIPLE SITES: ICD-10-CM

## 2024-01-29 DIAGNOSIS — M85.80 OSTEOPENIA, UNSPECIFIED LOCATION: ICD-10-CM

## 2024-01-29 PROCEDURE — 77080 DXA BONE DENSITY AXIAL: CPT | Mod: 26,,, | Performed by: INTERNAL MEDICINE

## 2024-01-29 PROCEDURE — 77080 DXA BONE DENSITY AXIAL: CPT | Mod: TC,PO

## 2024-03-04 ENCOUNTER — HOSPITAL ENCOUNTER (EMERGENCY)
Facility: HOSPITAL | Age: 69
Discharge: HOME | End: 2024-03-04
Attending: EMERGENCY MEDICINE
Payer: MEDICARE

## 2024-03-04 VITALS
WEIGHT: 184 LBS | RESPIRATION RATE: 16 BRPM | OXYGEN SATURATION: 99 % | HEIGHT: 66 IN | HEART RATE: 61 BPM | DIASTOLIC BLOOD PRESSURE: 70 MMHG | TEMPERATURE: 98 F | BODY MASS INDEX: 29.57 KG/M2 | SYSTOLIC BLOOD PRESSURE: 112 MMHG

## 2024-03-04 DIAGNOSIS — R10.13 EPIGASTRIC PAIN: Primary | ICD-10-CM

## 2024-03-04 DIAGNOSIS — M79.601 PAIN OF RIGHT UPPER EXTREMITY: ICD-10-CM

## 2024-03-04 DIAGNOSIS — R07.9 CHEST PAIN IN ADULT: ICD-10-CM

## 2024-03-04 DIAGNOSIS — R07.9 CHEST PAIN: ICD-10-CM

## 2024-03-04 LAB
ALBUMIN SERPL BCP-MCNC: 4.2 G/DL (ref 3.5–5.2)
ALP SERPL-CCNC: 85 U/L (ref 55–135)
ALT SERPL W/O P-5'-P-CCNC: 28 U/L (ref 10–44)
ANION GAP SERPL CALC-SCNC: 10 MMOL/L (ref 8–16)
AST SERPL-CCNC: 24 U/L (ref 10–40)
BASOPHILS # BLD AUTO: 0.06 K/UL (ref 0–0.2)
BASOPHILS NFR BLD: 1.3 % (ref 0–1.9)
BILIRUB SERPL-MCNC: 0.3 MG/DL (ref 0.1–1)
BILIRUB UR QL STRIP: NEGATIVE
BNP SERPL-MCNC: 40 PG/ML (ref 0–99)
BUN SERPL-MCNC: 12 MG/DL (ref 8–23)
CALCIUM SERPL-MCNC: 9.9 MG/DL (ref 8.7–10.5)
CHLORIDE SERPL-SCNC: 109 MMOL/L (ref 95–110)
CLARITY UR REFRACT.AUTO: CLEAR
CO2 SERPL-SCNC: 23 MMOL/L (ref 23–29)
COLOR UR AUTO: COLORLESS
CREAT SERPL-MCNC: 0.8 MG/DL (ref 0.5–1.4)
DIFFERENTIAL METHOD BLD: NORMAL
EOSINOPHIL # BLD AUTO: 0.1 K/UL (ref 0–0.5)
EOSINOPHIL NFR BLD: 2.3 % (ref 0–8)
ERYTHROCYTE [DISTWIDTH] IN BLOOD BY AUTOMATED COUNT: 12.7 % (ref 11.5–14.5)
EST. GFR  (NO RACE VARIABLE): >60 ML/MIN/1.73 M^2
GLUCOSE SERPL-MCNC: 93 MG/DL (ref 70–110)
GLUCOSE UR QL STRIP: NEGATIVE
HCT VFR BLD AUTO: 42.8 % (ref 37–48.5)
HCV AB SERPL QL IA: NORMAL
HGB BLD-MCNC: 14 G/DL (ref 12–16)
HGB UR QL STRIP: NEGATIVE
HIV 1+2 AB+HIV1 P24 AG SERPL QL IA: NORMAL
IMM GRANULOCYTES # BLD AUTO: 0.01 K/UL (ref 0–0.04)
IMM GRANULOCYTES NFR BLD AUTO: 0.2 % (ref 0–0.5)
INR PPP: 1.1 (ref 0.8–1.2)
KETONES UR QL STRIP: NEGATIVE
LEUKOCYTE ESTERASE UR QL STRIP: NEGATIVE
LYMPHOCYTES # BLD AUTO: 1.1 K/UL (ref 1–4.8)
LYMPHOCYTES NFR BLD: 22.2 % (ref 18–48)
MCH RBC QN AUTO: 29.2 PG (ref 27–31)
MCHC RBC AUTO-ENTMCNC: 32.7 G/DL (ref 32–36)
MCV RBC AUTO: 89 FL (ref 82–98)
MONOCYTES # BLD AUTO: 0.3 K/UL (ref 0.3–1)
MONOCYTES NFR BLD: 5.9 % (ref 4–15)
NEUTROPHILS # BLD AUTO: 3.2 K/UL (ref 1.8–7.7)
NEUTROPHILS NFR BLD: 68.1 % (ref 38–73)
NITRITE UR QL STRIP: NEGATIVE
NRBC BLD-RTO: 0 /100 WBC
OHS QRS DURATION: 94 MS
OHS QTC CALCULATION: 427 MS
PH UR STRIP: 8 [PH] (ref 5–8)
PLATELET # BLD AUTO: 236 K/UL (ref 150–450)
PMV BLD AUTO: 10.7 FL (ref 9.2–12.9)
POTASSIUM SERPL-SCNC: 3.7 MMOL/L (ref 3.5–5.1)
PROT SERPL-MCNC: 7 G/DL (ref 6–8.4)
PROT UR QL STRIP: NEGATIVE
PROTHROMBIN TIME: 11.5 SEC (ref 9–12.5)
RBC # BLD AUTO: 4.79 M/UL (ref 4–5.4)
SARS-COV-2 RDRP RESP QL NAA+PROBE: NEGATIVE
SODIUM SERPL-SCNC: 142 MMOL/L (ref 136–145)
SP GR UR STRIP: 1.01 (ref 1–1.03)
TROPONIN I SERPL DL<=0.01 NG/ML-MCNC: <0.006 NG/ML (ref 0–0.03)
URN SPEC COLLECT METH UR: ABNORMAL
WBC # BLD AUTO: 4.73 K/UL (ref 3.9–12.7)

## 2024-03-04 PROCEDURE — 63600175 PHARM REV CODE 636 W HCPCS

## 2024-03-04 PROCEDURE — 83880 ASSAY OF NATRIURETIC PEPTIDE: CPT | Performed by: EMERGENCY MEDICINE

## 2024-03-04 PROCEDURE — 99285 EMERGENCY DEPT VISIT HI MDM: CPT | Mod: 25

## 2024-03-04 PROCEDURE — 85025 COMPLETE CBC W/AUTO DIFF WBC: CPT | Performed by: EMERGENCY MEDICINE

## 2024-03-04 PROCEDURE — 85610 PROTHROMBIN TIME: CPT | Performed by: EMERGENCY MEDICINE

## 2024-03-04 PROCEDURE — 96374 THER/PROPH/DIAG INJ IV PUSH: CPT

## 2024-03-04 PROCEDURE — 93005 ELECTROCARDIOGRAM TRACING: CPT

## 2024-03-04 PROCEDURE — 81003 URINALYSIS AUTO W/O SCOPE: CPT

## 2024-03-04 PROCEDURE — 87389 HIV-1 AG W/HIV-1&-2 AB AG IA: CPT | Performed by: EMERGENCY MEDICINE

## 2024-03-04 PROCEDURE — 84484 ASSAY OF TROPONIN QUANT: CPT | Performed by: EMERGENCY MEDICINE

## 2024-03-04 PROCEDURE — 80053 COMPREHEN METABOLIC PANEL: CPT | Performed by: EMERGENCY MEDICINE

## 2024-03-04 PROCEDURE — 93010 ELECTROCARDIOGRAM REPORT: CPT | Mod: ,,, | Performed by: INTERNAL MEDICINE

## 2024-03-04 PROCEDURE — 86803 HEPATITIS C AB TEST: CPT | Performed by: EMERGENCY MEDICINE

## 2024-03-04 PROCEDURE — U0002 COVID-19 LAB TEST NON-CDC: HCPCS

## 2024-03-04 RX ORDER — KETOROLAC TROMETHAMINE 30 MG/ML
10 INJECTION, SOLUTION INTRAMUSCULAR; INTRAVENOUS
Status: COMPLETED | OUTPATIENT
Start: 2024-03-04 | End: 2024-03-04

## 2024-03-04 RX ORDER — PANTOPRAZOLE SODIUM 40 MG/10ML
40 INJECTION, POWDER, LYOPHILIZED, FOR SOLUTION INTRAVENOUS DAILY
Status: DISCONTINUED | OUTPATIENT
Start: 2024-03-04 | End: 2024-03-04 | Stop reason: HOSPADM

## 2024-03-04 RX ADMIN — KETOROLAC TROMETHAMINE 10 MG: 30 INJECTION, SOLUTION INTRAMUSCULAR; INTRAVENOUS at 05:03

## 2024-03-04 NOTE — DISCHARGE INSTRUCTIONS
Your workup here in the emergency department was found to be negative, your laboratory values were all within normal limits.  Please return to the emergency department if you continue to have worsening symptoms so that a further workup can be performed.

## 2024-03-04 NOTE — ED NOTES
Socorroleoncio Amaro, a 68 y.o. female presents to the ED w/ complaint of abdominal pain.Patient states ''the abdominal pain starting 8 days ago,I have been taking gas -x.    Triage note:  Chief Complaint   Patient presents with    Fatigue    Abdominal Pain     States woke up with nausea.epigastric pain and right arm pain     Review of patient's allergies indicates:   Allergen Reactions    Erythromycin base Nausea And Vomiting    Erythromycin Nausea And Vomiting     Severe vomiting    Losartan      cough     Past Medical History:   Diagnosis Date    ASD (atrial septal defect)     HTN (hypertension)     Kidney stones     STEMI (ST elevation myocardial infarction) 06/30/2018    embolic event    Thyroid disease

## 2024-03-04 NOTE — ED PROVIDER NOTES
Encounter Date: 3/4/2024       History     Chief Complaint   Patient presents with    Fatigue    Abdominal Pain     States woke up with nausea.epigastric pain and right arm pain     The history is provided by the patient and the spouse.     Socorro Amaro is a 68 year old female with a PMH CAD/PCI secondary to a patent foramen ovale that has now since been fixed presenting for pain in her neck radiating to right arm, abdominal pain, fatigue. In regards to her presenting complaint, she mentions that she awoke from sleep with pain radiating from her neck to right arm and jaw, approximately lasting for 1 hour. She additionally endorses feeling nausea and dyspnea, with some anxiety as well. She measured her blood pressure at home which was 159/108. She took Lorazepam which helped with her symptoms. She currently endorses having ongoing pain in the shoulder, described as tingling. She mentions her symptoms have improved but mentions ongoing reflux type discomfort as well. She denies current chest pain, abdominal pain, lower limb pain. She denies vomiting.     Review of patient's allergies indicates:   Allergen Reactions    Erythromycin base Nausea And Vomiting    Erythromycin Nausea And Vomiting     Severe vomiting    Losartan      cough     Past Medical History:   Diagnosis Date    ASD (atrial septal defect)     HTN (hypertension)     Kidney stones     STEMI (ST elevation myocardial infarction) 06/30/2018    embolic event    Thyroid disease      Past Surgical History:   Procedure Laterality Date    ASD REPAIR  03/2019    percutanous ASD closure    CARDIAC CATHETERIZATION      Embolic occlusions of LAD, No other CAD    COLONOSCOPY  12/04/2018    Juancarlos Phillips Jr., MD    CORONARY ANGIOPLASTY  06/2018    LAD thrombectomy in [place]    kidney stone removal  01/2022    OOPHORECTOMY  2016    Bilateral    RHINOPLASTY TIP      2006/2006    TONSILLECTOMY, ADENOIDECTOMY       Family History   Problem Relation Age of  Onset    Arthritis Mother     Cancer Mother 72        spindle cell ca  tongue     Hypertension Mother     Hyperlipidemia Mother     Depression Sister     Hypertension Brother     Depression Sister     Hypertension Father     Heart disease Father     Breast cancer Paternal Aunt     Colon cancer Neg Hx     Ovarian cancer Neg Hx      Social History     Tobacco Use    Smoking status: Never    Smokeless tobacco: Never   Substance Use Topics    Alcohol use: Yes     Alcohol/week: 3.0 standard drinks of alcohol     Types: 1 Glasses of wine, 1 Cans of beer, 1 Shots of liquor per week     Comment: social    Drug use: Never         Physical Exam     Initial Vitals [03/04/24 0316]   BP Pulse Resp Temp SpO2   (!) 175/84 81 16 97.6 °F (36.4 °C) 99 %      MAP       --         Physical Exam    Nursing note and vitals reviewed.  Constitutional: She appears well-developed. No distress.   HENT:   Head: Normocephalic and atraumatic.   Mouth/Throat: Oropharynx is clear and moist and mucous membranes are normal.   Eyes: EOM are normal. Pupils are equal, round, and reactive to light.   Neck:   Normal range of motion.  Cardiovascular:  Normal rate, regular rhythm and normal heart sounds.           No murmur heard.  Pulmonary/Chest: Breath sounds normal. No respiratory distress. She has no wheezes. She has no rhonchi. She has no rales.   Abdominal: Abdomen is soft. She exhibits no distension. There is no abdominal tenderness. There is no rebound and no guarding.   Musculoskeletal:         General: No edema.      Cervical back: Normal range of motion.     Neurological: She is alert and oriented to person, place, and time.   Skin: Skin is warm.   Psychiatric: She has a normal mood and affect. Her behavior is normal. Thought content normal.         ED Course   Procedures  Labs Reviewed   URINALYSIS, REFLEX TO URINE CULTURE - Abnormal; Notable for the following components:       Result Value    Color, UA Colorless (*)     All other components  within normal limits    Narrative:     Specimen Source->Urine   HIV 1 / 2 ANTIBODY    Narrative:     Release to patient->Immediate   HEPATITIS C ANTIBODY    Narrative:     Release to patient->Immediate   CBC W/ AUTO DIFFERENTIAL    Narrative:     Release to patient->Immediate   COMPREHENSIVE METABOLIC PANEL    Narrative:     Release to patient->Immediate   B-TYPE NATRIURETIC PEPTIDE    Narrative:     Release to patient->Immediate   TROPONIN I    Narrative:     Release to patient->Immediate   PROTIME-INR    Narrative:     Release to patient->Immediate   SARS-COV-2 RNA AMPLIFICATION, QUAL     EKG Readings: (Independently Interpreted)   Normal sinus rhythm, normal axis, a few scattered T-wave inversions, AR interval within normal limits, QRS complex is narrow, QT segment within normal limits, no STEMI.       Imaging Results              X-Ray Chest PA And Lateral (Final result)  Result time 03/04/24 06:06:54      Final result by Salazar Garcia MD (03/04/24 06:06:54)                   Impression:      No significant intrathoracic abnormality.  No significant detrimental interval change in the appearance of the chest since 12/06/2022 is appreciated.      Electronically signed by: Salazar Garcia MD  Date:    03/04/2024  Time:    06:06               Narrative:    EXAMINATION:  XR CHEST PA AND LATERAL    CLINICAL HISTORY:  Chest Pain;    TECHNIQUE:  Two views of the chest were obtained, with PA and lateral projections submitted.    COMPARISON:  Comparison is made to 12/06/2022.  Clinical information obtained from the electronic medical record indicates abdominal pain.    FINDINGS:  Heart size is normal, as is the appearance of the pulmonary vascularity.  Intracardiac device is again noted, related to a prior endovascular closure of a patent foramen ovale, this history being obtained from the electronic medical record.  Lung zones are clear, and are free of significant airspace consolidation or volume loss, with a calcified  granuloma in the right upper lung zone again incidentally noted.  No pleural fluid.  No hilar or mediastinal mass lesion.  No pneumothorax.                                       Medications   pantoprazole injection 40 mg (has no administration in time range)   ketorolac injection 9.999 mg (9.999 mg Intravenous Given 3/4/24 3237)     Medical Decision Making  Ms. Amaro is a 68-year-old female who presents due to an episode of right-sided neck/arm pain, nausea and diarrhea.  Differential diagnosis includes but is not limited to anxiety, musculoskeletal, UTI, GERD, gastroenteritis, ACS.  Upon my initial evaluation of the patient, she had overall reassuring vital signs as well as a reassuring physical exam.  As part of a workup for my differential, I will obtain basic labs, EKG, troponin, BNP, urinalysis, COVID swab, and a chest x-ray.  We will give the patient a dose of Protonix/Toradol for symptom control.    The entirety of our workup, including both laboratory studies/radiologic studies were found to be unremarkable.  The patient remained hemodynamically stable here in the emergency department.  I believe the likely etiology behind this patient's symptoms is either musculoskeletal or anxiety based.  Reassurance given to the patient, with strict return precautions.  Patient is stable for discharge at this time.    Amount and/or Complexity of Data Reviewed  External Data Reviewed: notes.     Details: Previous medical records, specifically from her most recent visit with her PCP on 01/2024 was reviewed to better understand the patient's current/past medical problems.  Labs: ordered.  Radiology: ordered.    Risk  Prescription drug management.                                      Clinical Impression:  Final diagnoses:  [R07.9] Chest pain in adult  [R07.9] Chest pain  [R10.13] Epigastric pain (Primary)  [M79.601] Pain of right upper extremity          ED Disposition Condition    Discharge Stable          ED Prescriptions     None       Follow-up Information       Follow up With Specialties Details Why Contact Info    Joy Rice MD Internal Medicine In 1 week As needed [address] [address]  [phone]               Osorio Ramos MD  Resident  03/04/24 0719

## 2024-03-12 PROBLEM — I70.0 AORTIC ATHEROSCLEROSIS: Status: ACTIVE | Noted: 2024-03-12

## 2024-03-13 NOTE — PROGRESS NOTES
Subjective:   Patient ID:  Socorro Amaro is a 68 y.o. female who presents for evaluation of No chief complaint on file.    The patient location is: home  The chief complaint leading to consultation is: follow up HTN    Visit type: audiovisual    Face to Face time with patient: 30 min  30 minutes of total time spent on the encounter, which includes face to face time and non-face to face time preparing to see the patient (eg, review of tests), Obtaining and/or reviewing separately obtained history, Documenting clinical information in the electronic or other health record, Independently interpreting results (not separately reported) and communicating results to the patient/family/caregiver, or Care coordination (not separately reported).         Each patient to whom he or she provides medical services by telemedicine is:  (1) informed of the relationship between the physician and patient and the respective role of any other health care provider with respect to management of the patient; and (2) notified that he or she may decline to receive medical services by telemedicine and may withdraw from such care at any time.    Notes:       PROBLEM LIST:  Embolic anterior STEMI 6/18 (LAD occlusion)  ASD closure 3/19  HTN  CCS 0 2018    HPI 10/18: She presents today to establish care following an embolic STEMI in her LAD in June while in [place]. She has a history of HTN. She is quite active, hiking regularly. In June she developed acute onset SSCP and was found to be having an anterior STEMI. She was taken to the cath lab emergently and found to have an embolic occlusion of her LAD and no CAD. She was treated with thrombectomy. The event was thought to be secondary to self reported atrial fibrillation, however, she has not actually ever had any documented afib. She does report frequent heart palpitations that only last a few seconds. A 48 hour holter monitor was done earlier this month which was normal. She denies any  history of VTE. She was on a 2 day car ride two weeks prior to her event. She flies often. There is no family history of VTE. She did not have any calf pain or swelling prior to the STEMI. She has recovered well. She has not done cardiac rehab and would like to do it at .  Her echo from [place] showed and LVEF of 55-60% with apical hypokinesis.      Interval history:  She was seen in the ED 3/ for abdominal pain/nausea. Work up was normal. Thinks it was a panic attack which has happened a few times. Also reports a cough. Got worse after decreasing her prilosec. Had a cough with losartan in the past. Socorro Amaro denies any chest pain, shortness of breath, PND, orthopnea, palpitations, leg edema, or syncope. Continues to exercise regularly. Does a lot of bike riding in the summer when in [place]. Had decreased her lisinopril to dose to 2.5 mg daily as she had bene feeling a little lightheaded. Symptoms improved with dose reduction. Bp's at goal in Digital HTN program.    EC-MAR-2024 03:07:17: Personally reviewed by me shows NSR 64 bpm    Echo :  Summary    Normal left ventricular systolic function. The estimated ejection fraction is 60%  Normal right ventricular systolic function.  Normal LV diastolic function.  Mild left atrial enlargement.  Mild mitral regurgitation.  The estimated PA systolic pressure is 24 mm Hg  Normal central venous pressure (3 mm Hg).  There is an atrial septal closure device present with no evidence of residual shunting.    MARINE :  Summary    Normal left ventricular systolic function. The estimated ejection fraction is 60%  Normal right ventricular systolic function.  Normal appearing left atrial appendage. No thrombus is present in the appendage. Abnormal appendage velocities.  Trace mitral regurgitation.  Small secundum interatrial septal defect present with left to right shunting indicated by color flow Doppler. No right to left shunt with contrast bubble.  Normal atrial  size.  Normal LV diastolic function.        Past Medical History:   Diagnosis Date    ASD (atrial septal defect)     HTN (hypertension)     Kidney stones     STEMI (ST elevation myocardial infarction) 06/30/2018    embolic event    Thyroid disease        Past Surgical History:   Procedure Laterality Date    ASD REPAIR  03/2019    percutanous ASD closure    CARDIAC CATHETERIZATION      Embolic occlusions of LAD, No other CAD    COLONOSCOPY  12/04/2018    Juancarlos Phillips Jr., MD    CORONARY ANGIOPLASTY  06/2018    LAD thrombectomy in [place]    kidney stone removal  01/2022    OOPHORECTOMY  2016    Bilateral    RHINOPLASTY TIP      2006/2006    TONSILLECTOMY, ADENOIDECTOMY         Social History     Socioeconomic History    Marital status:    Tobacco Use    Smoking status: Never    Smokeless tobacco: Never   Substance and Sexual Activity    Alcohol use: Yes     Alcohol/week: 3.0 standard drinks of alcohol     Types: 1 Glasses of wine, 1 Cans of beer, 1 Shots of liquor per week     Comment: social    Drug use: Never    Sexual activity: Not Currently     Partners: Male     Birth control/protection: None, See Surgical Hx     Social Determinants of Health     Financial Resource Strain: Low Risk  (12/18/2019)    Overall Financial Resource Strain (CARDIA)     Difficulty of Paying Living Expenses: Not hard at all   Food Insecurity: No Food Insecurity (12/18/2019)    Hunger Vital Sign     Worried About Running Out of Food in the Last Year: Never true     Ran Out of Food in the Last Year: Never true   Transportation Needs: No Transportation Needs (12/18/2019)    PRAPARE - Transportation     Lack of Transportation (Medical): No     Lack of Transportation (Non-Medical): No   Social Connections: Unknown (12/18/2019)    Social Connection and Isolation Panel [NHANES]     Frequency of Communication with Friends and Family: More than three times a week       Family History   Problem Relation Age of Onset    Arthritis  Mother     Cancer Mother 72        spindle cell ca  tongue     Hypertension Mother     Hyperlipidemia Mother     Depression Sister     Hypertension Brother     Depression Sister     Hypertension Father     Heart disease Father     Breast cancer Paternal Aunt     Colon cancer Neg Hx     Ovarian cancer Neg Hx        Patient's Medications   New Prescriptions    No medications on file   Previous Medications    5-HYDROXYTRYPTOPHAN, 5-HTP, (5-HTP ORAL)    Take by mouth every evening.    AMLODIPINE (NORVASC) 10 MG TABLET    Take 1 tablet (10 mg total) by mouth once daily.    ASPIRIN (ECOTRIN) 81 MG EC TABLET    Take 81 mg by mouth once daily.    CHOLECALCIFEROL, VITAMIN D3, (VITAMIN D3) 25 MCG (1,000 UNIT) CAPSULE    Take 1,000 Units by mouth once daily.     CYANOCOBALAMIN (VITAMIN B-12) 1000 MCG TABLET    Take 5,000 mcg by mouth once daily.    LEVOTHYROXINE (SYNTHROID) 75 MCG TABLET    Take 1 tablet (75 mcg total) by mouth before breakfast.    LISINOPRIL (PRINIVIL,ZESTRIL) 5 MG TABLET    Take 0.5 tablets (2.5 mg total) by mouth once daily.    LORATADINE (CLARITIN) 10 MG TABLET    Allerclear 10 mg tablet   Take 1 tablet as needed by oral route.    LORAZEPAM (ATIVAN) 1 MG TABLET    Take 1 tablet (1 mg total) by mouth daily as needed for Anxiety ([facility] anxiety.).    METOPROLOL SUCCINATE (TOPROL-XL) 25 MG 24 HR TABLET    TAKE 1 TABLET BY MOUTH EVERY DAY    MULTIVITAMIN CAPSULE    Take 1 capsule by mouth once daily.    OMEPRAZOLE (PRILOSEC) 40 MG CAPSULE    Take 1 capsule (40 mg total) by mouth once daily.    ONDANSETRON (ZOFRAN) 8 MG TABLET    Take 1 tablet (8 mg total) by mouth every 8 (eight) hours as needed for Nausea.    TRAZODONE (DESYREL) 50 MG TABLET    CAN TAKE HALF TO WHOLE PILL AT NIGHT AS NEEDED FOR SLEEP.   Modified Medications    No medications on file   Discontinued Medications    No medications on file           There were no vitals taken for this visit.    Objective:       Lab Results   Component Value Date      03/04/2024    K 3.7 03/04/2024     03/04/2024    CO2 23 03/04/2024    BUN 12 03/04/2024    CREATININE 0.8 03/04/2024    GLU 93 03/04/2024    MG 2.0 10/22/2018    AST 24 03/04/2024    ALT 28 03/04/2024    ALBUMIN 4.2 03/04/2024    PROT 7.0 03/04/2024    BILITOT 0.3 03/04/2024    WBC 4.73 03/04/2024    HGB 14.0 03/04/2024    HCT 42.8 03/04/2024    MCV 89 03/04/2024     03/04/2024    INR 1.1 03/04/2024    TSH 1.539 05/11/2021    CHOL 179 06/12/2020    HDL 84 (H) 06/12/2020    LDLCALC 86.2 06/12/2020    TRIG 44 06/12/2020    BNP 40 03/04/2024       Assessment:     1. Essential hypertension :  She is actively enrolled in the digital hypertension program.  BP mainly at goal. Reports dry cough. Discussed holding lisinopril for 1-2 weeks to see if her symptoms improve. If so, will dc lisinopril and change metoprolol to carvedilol for better BP control.   2. ST elevation myocardial infarction involving left anterior descending (LAD) coronary artery :  She is asymptomatic and remains on metoprolol.  Her event was embolic, and her lipids are at goal, therefore she has not been on statin therapy. She does have mild aortic plaque on her CT. Will check coronary calcium score.   3.     S/P ASD closure:  Continue aspirin.  There is no residual shunting on her last echocardiogram.  4.     Panic attacks: I advised her to speak with Dr. Rice to see if a mood stabilizer may help with her symptoms.    Plan:     Diagnoses and all orders for this visit:    History of percutaneous transcatheter closure of congenital ASD    Aortic atherosclerosis    Primary hypertension    History of ST elevation myocardial infarction (STEMI)        Thank you for allowing me to participate in this patient's care. Please do not hesitate to contact me with any questions or concerns.

## 2024-03-14 ENCOUNTER — OFFICE VISIT (OUTPATIENT)
Dept: CARDIOLOGY | Facility: CLINIC | Age: 69
End: 2024-03-14
Payer: MEDICARE

## 2024-03-14 DIAGNOSIS — I25.2 HISTORY OF ST ELEVATION MYOCARDIAL INFARCTION (STEMI): ICD-10-CM

## 2024-03-14 DIAGNOSIS — I10 PRIMARY HYPERTENSION: ICD-10-CM

## 2024-03-14 DIAGNOSIS — I70.0 AORTIC ATHEROSCLEROSIS: ICD-10-CM

## 2024-03-14 DIAGNOSIS — Z13.6 ENCOUNTER FOR SCREENING FOR CARDIOVASCULAR DISORDERS: ICD-10-CM

## 2024-03-14 DIAGNOSIS — Z87.74 HISTORY OF PERCUTANEOUS TRANSCATHETER CLOSURE OF CONGENITAL ASD: Primary | ICD-10-CM

## 2024-03-14 PROCEDURE — 99214 OFFICE O/P EST MOD 30 MIN: CPT | Mod: 95,,, | Performed by: INTERNAL MEDICINE

## 2024-03-14 NOTE — PATIENT INSTRUCTIONS
Hold lisinopril for 1-2 weeks and see if your cough improves. If so, we will change your metoprolol to carvedilol and stop the lisinopril.

## 2024-03-28 ENCOUNTER — HOSPITAL ENCOUNTER (OUTPATIENT)
Dept: RADIOLOGY | Facility: HOSPITAL | Age: 69
Discharge: HOME | End: 2024-03-28
Attending: INTERNAL MEDICINE
Payer: MEDICARE

## 2024-03-28 ENCOUNTER — TELEPHONE (OUTPATIENT)
Dept: RADIOLOGY | Facility: HOSPITAL | Age: 69
End: 2024-03-28
Payer: COMMERCIAL

## 2024-03-28 ENCOUNTER — PATIENT MESSAGE (OUTPATIENT)
Dept: CARDIOLOGY | Facility: CLINIC | Age: 69
End: 2024-03-28
Payer: COMMERCIAL

## 2024-03-28 ENCOUNTER — TELEPHONE (OUTPATIENT)
Dept: CARDIOLOGY | Facility: CLINIC | Age: 69
End: 2024-03-28
Payer: COMMERCIAL

## 2024-03-28 DIAGNOSIS — Z13.6 ENCOUNTER FOR SCREENING FOR CARDIOVASCULAR DISORDERS: ICD-10-CM

## 2024-03-28 PROCEDURE — 75571 CT HRT W/O DYE W/CA TEST: CPT | Mod: 26,,, | Performed by: RADIOLOGY

## 2024-03-28 PROCEDURE — 75571 CT HRT W/O DYE W/CA TEST: CPT | Mod: TC

## 2024-03-28 NOTE — TELEPHONE ENCOUNTER
----- Message from Caity George sent at 3/28/2024 11:22 AM CDT -----  Regarding: appt access  Contact: imelda @ [phone]  Imelda with Dr. Rice's office is calling to get pt scheduled for N63.20 (ICD-10-CM) - Mass of left breast, unspecified quadrant; [id number] - MAMMO DIGITAL DIAGNOSTIC LEFT WITH RONALDO. Mass seen on ct score was an incidental finding. Please call to advise further. Thank you for all you are doing.

## 2024-03-28 NOTE — TELEPHONE ENCOUNTER
I reviewed Ms Amaro's CT report with her. Her coronary calcium score was 0, however a  1.9 x 1.1 cm soft tissue density in the left breast was reported. A follow up mammogram is recommended. I told her I would notify her PCP, Dr. Rice, for further evaluation.

## 2024-04-18 ENCOUNTER — HOSPITAL ENCOUNTER (OUTPATIENT)
Dept: RADIOLOGY | Facility: OTHER | Age: 69
Discharge: HOME | End: 2024-04-18
Attending: INTERNAL MEDICINE
Payer: MEDICARE

## 2024-04-18 DIAGNOSIS — N63.20 MASS OF LEFT BREAST, UNSPECIFIED QUADRANT: ICD-10-CM

## 2024-04-18 DIAGNOSIS — N63.20 MASS OF LEFT BREAST: ICD-10-CM

## 2024-04-18 PROCEDURE — 77062 BREAST TOMOSYNTHESIS BI: CPT | Mod: 26,,, | Performed by: RADIOLOGY

## 2024-04-18 PROCEDURE — 76642 ULTRASOUND BREAST LIMITED: CPT | Mod: 26,LT,, | Performed by: RADIOLOGY

## 2024-04-18 PROCEDURE — 77062 BREAST TOMOSYNTHESIS BI: CPT | Mod: TC

## 2024-04-18 PROCEDURE — 77066 DX MAMMO INCL CAD BI: CPT | Mod: 26,,, | Performed by: RADIOLOGY

## 2024-04-18 PROCEDURE — 77061 BREAST TOMOSYNTHESIS UNI: CPT | Mod: TC,LT

## 2024-04-18 PROCEDURE — 76642 ULTRASOUND BREAST LIMITED: CPT | Mod: TC,LT

## 2024-04-18 PROCEDURE — 77065 DX MAMMO INCL CAD UNI: CPT | Mod: TC,LT

## 2024-06-21 ENCOUNTER — PATIENT MESSAGE (OUTPATIENT)
Dept: ADMINISTRATIVE | Facility: OTHER | Age: 69
End: 2024-06-21
Payer: COMMERCIAL

## 2024-07-01 DIAGNOSIS — Z87.74 HISTORY OF PERCUTANEOUS TRANSCATHETER CLOSURE OF CONGENITAL ASD: ICD-10-CM

## 2024-07-01 DIAGNOSIS — R07.9 CHEST PAIN, UNSPECIFIED TYPE: ICD-10-CM

## 2024-07-01 DIAGNOSIS — I10 ESSENTIAL HYPERTENSION: ICD-10-CM

## 2024-07-02 RX ORDER — AMLODIPINE BESYLATE 10 MG/1
10 TABLET ORAL DAILY
Qty: 90 TABLET | Refills: 3 | Status: SHIPPED | OUTPATIENT
Start: 2024-07-02 | End: 2025-06-27

## 2024-09-19 ENCOUNTER — APPOINTMENT (RX ONLY)
Dept: URBAN - NONMETROPOLITAN AREA CLINIC 25 | Facility: CLINIC | Age: 69
Setting detail: DERMATOLOGY
End: 2024-09-19

## 2024-09-19 DIAGNOSIS — Z80.8 FAMILY HISTORY OF MALIGNANT NEOPLASM OF OTHER ORGANS OR SYSTEMS: ICD-10-CM

## 2024-09-19 DIAGNOSIS — L57.8 OTHER SKIN CHANGES DUE TO CHRONIC EXPOSURE TO NONIONIZING RADIATION: ICD-10-CM

## 2024-09-19 DIAGNOSIS — D22 MELANOCYTIC NEVI: ICD-10-CM

## 2024-09-19 DIAGNOSIS — L44.8 OTHER SPECIFIED PAPULOSQUAMOUS DISORDERS: ICD-10-CM

## 2024-09-19 DIAGNOSIS — L91.8 OTHER HYPERTROPHIC DISORDERS OF THE SKIN: ICD-10-CM

## 2024-09-19 DIAGNOSIS — L82.1 OTHER SEBORRHEIC KERATOSIS: ICD-10-CM

## 2024-09-19 DIAGNOSIS — D18.0 HEMANGIOMA: ICD-10-CM

## 2024-09-19 DIAGNOSIS — L81.4 OTHER MELANIN HYPERPIGMENTATION: ICD-10-CM

## 2024-09-19 DIAGNOSIS — L28.0 LICHEN SIMPLEX CHRONICUS: ICD-10-CM

## 2024-09-19 DIAGNOSIS — D485 NEOPLASM OF UNCERTAIN BEHAVIOR OF SKIN: ICD-10-CM

## 2024-09-19 PROBLEM — D23.61 OTHER BENIGN NEOPLASM OF SKIN OF RIGHT UPPER LIMB, INCLUDING SHOULDER: Status: ACTIVE | Noted: 2024-09-19

## 2024-09-19 PROBLEM — D22.5 MELANOCYTIC NEVI OF TRUNK: Status: ACTIVE | Noted: 2024-09-19

## 2024-09-19 PROBLEM — D48.5 NEOPLASM OF UNCERTAIN BEHAVIOR OF SKIN: Status: ACTIVE | Noted: 2024-09-19

## 2024-09-19 PROBLEM — D18.01 HEMANGIOMA OF SKIN AND SUBCUTANEOUS TISSUE: Status: ACTIVE | Noted: 2024-09-19

## 2024-09-19 PROCEDURE — 11102 TANGNTL BX SKIN SINGLE LES: CPT

## 2024-09-19 PROCEDURE — ? PRESCRIPTION MEDICATION MANAGEMENT

## 2024-09-19 PROCEDURE — 99214 OFFICE O/P EST MOD 30 MIN: CPT | Mod: 25

## 2024-09-19 PROCEDURE — ? SUNSCREEN RECOMMENDATIONS

## 2024-09-19 PROCEDURE — ? COUNSELING

## 2024-09-19 PROCEDURE — ? BIOPSY BY SHAVE METHOD

## 2024-09-19 ASSESSMENT — LOCATION ZONE DERM
LOCATION ZONE: ARM
LOCATION ZONE: LEG
LOCATION ZONE: TRUNK
LOCATION ZONE: AXILLAE

## 2024-09-19 ASSESSMENT — LOCATION SIMPLE DESCRIPTION DERM
LOCATION SIMPLE: LEFT THIGH
LOCATION SIMPLE: LEFT ANKLE
LOCATION SIMPLE: UPPER BACK
LOCATION SIMPLE: LEFT UPPER BACK
LOCATION SIMPLE: LEFT FOREARM
LOCATION SIMPLE: ABDOMEN
LOCATION SIMPLE: LEFT PRETIBIAL REGION
LOCATION SIMPLE: RIGHT UPPER BACK
LOCATION SIMPLE: RIGHT AXILLARY VAULT

## 2024-09-19 ASSESSMENT — LOCATION DETAILED DESCRIPTION DERM
LOCATION DETAILED: SUPERIOR THORACIC SPINE
LOCATION DETAILED: LEFT PROXIMAL DORSAL FOREARM
LOCATION DETAILED: RIGHT AXILLARY VAULT
LOCATION DETAILED: RIGHT SUPERIOR UPPER BACK
LOCATION DETAILED: RIGHT LATERAL ABDOMEN
LOCATION DETAILED: LEFT POSTERIOR ANKLE
LOCATION DETAILED: LEFT MID-UPPER BACK
LOCATION DETAILED: LEFT PROXIMAL PRETIBIAL REGION
LOCATION DETAILED: LEFT ANTERIOR PROXIMAL THIGH

## 2024-09-20 ENCOUNTER — PATIENT MESSAGE (OUTPATIENT)
Dept: CARDIOLOGY | Facility: CLINIC | Age: 69
End: 2024-09-20
Payer: COMMERCIAL

## 2025-01-02 DIAGNOSIS — I10 ESSENTIAL HYPERTENSION: ICD-10-CM

## 2025-01-02 DIAGNOSIS — R07.9 CHEST PAIN, UNSPECIFIED TYPE: ICD-10-CM

## 2025-01-02 DIAGNOSIS — Z87.74 HISTORY OF PERCUTANEOUS TRANSCATHETER CLOSURE OF CONGENITAL ASD: ICD-10-CM

## 2025-01-06 RX ORDER — AMLODIPINE BESYLATE 10 MG/1
10 TABLET ORAL DAILY
Qty: 90 TABLET | Refills: 3 | Status: SHIPPED | OUTPATIENT
Start: 2025-01-06 | End: 2026-01-01

## 2025-03-05 ENCOUNTER — PATIENT MESSAGE (OUTPATIENT)
Dept: CARDIOLOGY | Facility: CLINIC | Age: 70
End: 2025-03-05
Payer: COMMERCIAL

## 2025-04-22 ENCOUNTER — HOSPITAL ENCOUNTER (OUTPATIENT)
Dept: RADIOLOGY | Facility: HOSPITAL | Age: 70
Discharge: HOME | End: 2025-04-22
Attending: INTERNAL MEDICINE
Payer: MEDICARE

## 2025-04-22 DIAGNOSIS — Z12.31 SCREENING MAMMOGRAM FOR BREAST CANCER: ICD-10-CM

## 2025-04-22 PROCEDURE — 77063 BREAST TOMOSYNTHESIS BI: CPT | Mod: TC

## 2025-04-22 PROCEDURE — 77067 SCR MAMMO BI INCL CAD: CPT | Mod: 26,,, | Performed by: RADIOLOGY

## 2025-04-22 PROCEDURE — 77063 BREAST TOMOSYNTHESIS BI: CPT | Mod: 26,,, | Performed by: RADIOLOGY

## 2025-09-01 RX ORDER — VALSARTAN 40 MG/1
TABLET ORAL
Qty: 60 TABLET | Refills: 0 | OUTPATIENT
Start: 2025-09-01

## (undated) DEVICE — Device

## (undated) DEVICE — KIT MICROINTRO 4F .018X40X7CM

## (undated) DEVICE — COVER DRAPE ACUSON STERILE

## (undated) DEVICE — GUIDEWIRE EMERALD 150CM PTFE

## (undated) DEVICE — GUIDEWIRE AMPLATZ .035X260

## (undated) DEVICE — SHEATH BRITE TIP 9F 35CM

## (undated) DEVICE — GUIDEWIRE NITINOL HYBRID 150CM

## (undated) DEVICE — SPIKE CONTRAST CONTROLLER

## (undated) DEVICE — TUBE CONTRAST SQUEEZE

## (undated) DEVICE — WIRE GUIDE 0.038OLD

## (undated) DEVICE — CATH 6FR MPA-IIA

## (undated) DEVICE — OMNIPAQUE 350 200ML

## (undated) DEVICE — SOL IRR NACL .9% 3000ML

## (undated) DEVICE — SOL PVP-I SCRUB 7.5% 4OZ

## (undated) DEVICE — SHEATH PINNACLE INTRO 11FR

## (undated) DEVICE — BRUSH SCRUB SURGICALW/BETADINE

## (undated) DEVICE — GLOVE BIOGEL SKINSENSE PI 7.5

## (undated) DEVICE — REPROCESSED CATH ACUNAV 8FR

## (undated) DEVICE — SEE MEDLINE ITEM 156894

## (undated) DEVICE — EXTRACTOR STONE 4WR NIT 2.2F 1

## (undated) DEVICE — FIBER 272 MICRON HOLMIUM

## (undated) DEVICE — CATH URETERAL DUAL LUMEN 10FR

## (undated) DEVICE — SEE L#120831

## (undated) DEVICE — KIT CUSTOM MANIFOLD

## (undated) DEVICE — SET IRR URLGY 2LINE UNIV SPIKE

## (undated) DEVICE — BLLN SIZING AGA 24MM

## (undated) DEVICE — DRESSING TRANS 2X2 TEGADERM